# Patient Record
Sex: MALE | Race: WHITE | NOT HISPANIC OR LATINO | Employment: OTHER | URBAN - METROPOLITAN AREA
[De-identification: names, ages, dates, MRNs, and addresses within clinical notes are randomized per-mention and may not be internally consistent; named-entity substitution may affect disease eponyms.]

---

## 2023-09-19 ENCOUNTER — HOSPITAL ENCOUNTER (OUTPATIENT)
Dept: DATA CONVERSION | Facility: HOSPITAL | Age: 73
End: 2023-09-19
Attending: INTERNAL MEDICINE | Admitting: INTERNAL MEDICINE
Payer: MEDICARE

## 2023-09-19 DIAGNOSIS — D12.8 BENIGN NEOPLASM OF RECTUM: ICD-10-CM

## 2023-09-19 DIAGNOSIS — J84.10 PULMONARY FIBROSIS, UNSPECIFIED (MULTI): ICD-10-CM

## 2023-09-19 DIAGNOSIS — K64.1 SECOND DEGREE HEMORRHOIDS: ICD-10-CM

## 2023-09-19 DIAGNOSIS — Z12.11 ENCOUNTER FOR SCREENING FOR MALIGNANT NEOPLASM OF COLON: ICD-10-CM

## 2023-09-19 DIAGNOSIS — Z01.818 ENCOUNTER FOR OTHER PREPROCEDURAL EXAMINATION: ICD-10-CM

## 2023-09-19 DIAGNOSIS — D12.4 BENIGN NEOPLASM OF DESCENDING COLON: ICD-10-CM

## 2023-09-19 DIAGNOSIS — D12.0 BENIGN NEOPLASM OF CECUM: ICD-10-CM

## 2023-09-19 DIAGNOSIS — K57.30 DIVERTICULOSIS OF LARGE INTESTINE WITHOUT PERFORATION OR ABSCESS WITHOUT BLEEDING: ICD-10-CM

## 2023-09-19 DIAGNOSIS — Z86.010 PERSONAL HISTORY OF COLONIC POLYPS: ICD-10-CM

## 2023-09-22 ENCOUNTER — HOSPITAL ENCOUNTER (OUTPATIENT)
Dept: DATA CONVERSION | Facility: HOSPITAL | Age: 73
End: 2023-09-22
Attending: INTERNAL MEDICINE | Admitting: INTERNAL MEDICINE
Payer: MEDICARE

## 2023-09-22 DIAGNOSIS — Z48.21 ENCOUNTER FOR AFTERCARE FOLLOWING HEART TRANSPLANT (MULTI): ICD-10-CM

## 2023-09-22 DIAGNOSIS — J44.9 CHRONIC OBSTRUCTIVE PULMONARY DISEASE, UNSPECIFIED (MULTI): ICD-10-CM

## 2023-09-22 DIAGNOSIS — I10 ESSENTIAL (PRIMARY) HYPERTENSION: ICD-10-CM

## 2023-09-22 DIAGNOSIS — I25.10 ATHEROSCLEROTIC HEART DISEASE OF NATIVE CORONARY ARTERY WITHOUT ANGINA PECTORIS: ICD-10-CM

## 2023-09-22 DIAGNOSIS — E78.5 HYPERLIPIDEMIA, UNSPECIFIED: ICD-10-CM

## 2023-09-22 LAB
ACTIVATED PARTIAL THROMBOPLASTIN TIME IN PPP BY COAGULATION ASSAY: 31 SEC (ref 27–38)
ANION GAP IN SER/PLAS: 16 MMOL/L (ref 10–20)
CALCIUM (MG/DL) IN SER/PLAS: 10 MG/DL (ref 8.6–10.6)
CARBON DIOXIDE, TOTAL (MMOL/L) IN SER/PLAS: 24 MMOL/L (ref 21–32)
CHLORIDE (MMOL/L) IN SER/PLAS: 106 MMOL/L (ref 98–107)
CREATININE (MG/DL) IN SER/PLAS: 0.95 MG/DL (ref 0.5–1.3)
ERYTHROCYTE DISTRIBUTION WIDTH (RATIO) BY AUTOMATED COUNT: 12.8 % (ref 11.5–14.5)
ERYTHROCYTE MEAN CORPUSCULAR HEMOGLOBIN CONCENTRATION (G/DL) BY AUTOMATED: 32.9 G/DL (ref 32–36)
ERYTHROCYTE MEAN CORPUSCULAR VOLUME (FL) BY AUTOMATED COUNT: 95 FL (ref 80–100)
ERYTHROCYTES (10*6/UL) IN BLOOD BY AUTOMATED COUNT: 6.11 X10E12/L (ref 4.5–5.9)
GFR MALE: 86 ML/MIN/1.73M2
GLUCOSE (MG/DL) IN SER/PLAS: 90 MG/DL (ref 74–99)
HEMATOCRIT (%) IN BLOOD BY AUTOMATED COUNT: 58 % (ref 41–52)
HEMOGLOBIN (G/DL) IN BLOOD: 19.1 G/DL (ref 13.5–17.5)
INR IN PPP BY COAGULATION ASSAY: 1.1 (ref 0.9–1.1)
LEUKOCYTES (10*3/UL) IN BLOOD BY AUTOMATED COUNT: 11.4 X10E9/L (ref 4.4–11.3)
NRBC (PER 100 WBCS) BY AUTOMATED COUNT: 0 /100 WBC (ref 0–0)
PLATELETS (10*3/UL) IN BLOOD AUTOMATED COUNT: 186 X10E9/L (ref 150–450)
POTASSIUM (MMOL/L) IN SER/PLAS: 4.3 MMOL/L (ref 3.5–5.3)
PROTHROMBIN TIME (PT) IN PPP BY COAGULATION ASSAY: 12.4 SEC (ref 9.8–12.8)
SODIUM (MMOL/L) IN SER/PLAS: 142 MMOL/L (ref 136–145)
UREA NITROGEN (MG/DL) IN SER/PLAS: 12 MG/DL (ref 6–23)

## 2023-09-26 LAB
COMPLETE PATHOLOGY REPORT: NORMAL
CONVERTED CLINICAL DIAGNOSIS-HISTORY: NORMAL
CONVERTED FINAL DIAGNOSIS: NORMAL
CONVERTED FINAL REPORT PDF LINK TO COPY AND PASTE: NORMAL
CONVERTED GROSS DESCRIPTION: NORMAL

## 2023-09-29 ENCOUNTER — LAB (OUTPATIENT)
Dept: LAB | Facility: LAB | Age: 73
End: 2023-09-29
Payer: MEDICARE

## 2023-09-29 LAB
ABO GROUP (TYPE) IN BLOOD: NORMAL
ALANINE AMINOTRANSFERASE (SGPT) (U/L) IN SER/PLAS: 66 U/L (ref 10–52)
ALBUMIN (G/DL) IN SER/PLAS: 4.7 G/DL (ref 3.4–5)
ALKALINE PHOSPHATASE (U/L) IN SER/PLAS: 86 U/L (ref 33–136)
AMPHETAMINE (PRESENCE) IN URINE BY SCREEN METHOD: NORMAL
ANION GAP IN SER/PLAS: 16 MMOL/L (ref 10–20)
APPEARANCE, URINE: CLEAR
ASPARTATE AMINOTRANSFERASE (SGOT) (U/L) IN SER/PLAS: 34 U/L (ref 9–39)
BARBITURATES PRESENCE IN URINE BY SCREEN METHOD: NORMAL
BASOPHILS (10*3/UL) IN BLOOD BY AUTOMATED COUNT: 0.11 X10E9/L (ref 0–0.1)
BASOPHILS/100 LEUKOCYTES IN BLOOD BY AUTOMATED COUNT: 1 % (ref 0–2)
BENZODIAZEPINE (PRESENCE) IN URINE BY SCREEN METHOD: NORMAL
BILIRUBIN TOTAL (MG/DL) IN SER/PLAS: 0.7 MG/DL (ref 0–1.2)
BILIRUBIN, URINE: NEGATIVE
BLOOD, URINE: NEGATIVE
C REACTIVE PROTEIN (MG/L) IN SER/PLAS BY HIGH SENSIT: 6.6 MG/L
CALCIDIOL (25 OH VITAMIN D3) (NG/ML) IN SER/PLAS: 55 NG/ML
CALCIUM (MG/DL) IN SER/PLAS: 10.2 MG/DL (ref 8.6–10.6)
CANNABINOIDS IN URINE BY SCREEN METHOD: NORMAL
CARBON DIOXIDE, TOTAL (MMOL/L) IN SER/PLAS: 26 MMOL/L (ref 21–32)
CHLORIDE (MMOL/L) IN SER/PLAS: 102 MMOL/L (ref 98–107)
CHOLESTEROL (MG/DL) IN SER/PLAS: 161 MG/DL (ref 0–199)
CHOLESTEROL IN HDL (MG/DL) IN SER/PLAS: 33.2 MG/DL
CHOLESTEROL/HDL RATIO: 4.8
COCAINE (PRESENCE) IN URINE BY SCREEN METHOD: NORMAL
COLOR, URINE: YELLOW
CREATININE (MG/DL) IN SER/PLAS: 0.86 MG/DL (ref 0.5–1.3)
CYTOMEGALOVIRUS IGG ANTIBODY: NONREACTIVE
DRUG SCREEN COMMENT URINE: NORMAL
EOSINOPHILS (10*3/UL) IN BLOOD BY AUTOMATED COUNT: 0.14 X10E9/L (ref 0–0.7)
EOSINOPHILS/100 LEUKOCYTES IN BLOOD BY AUTOMATED COUNT: 1.2 % (ref 0–6)
ERYTHROCYTE DISTRIBUTION WIDTH (RATIO) BY AUTOMATED COUNT: 13.2 % (ref 11.5–14.5)
ERYTHROCYTE MEAN CORPUSCULAR HEMOGLOBIN CONCENTRATION (G/DL) BY AUTOMATED: 33.1 G/DL (ref 32–36)
ERYTHROCYTE MEAN CORPUSCULAR VOLUME (FL) BY AUTOMATED COUNT: 93 FL (ref 80–100)
ERYTHROCYTES (10*6/UL) IN BLOOD BY AUTOMATED COUNT: 6.69 X10E12/L (ref 4.5–5.9)
ESTIMATED AVERAGE GLUCOSE FOR HBA1C: 134 MG/DL
FENTANYL URINE: NORMAL
GFR MALE: >90 ML/MIN/1.73M2
GLUCOSE (MG/DL) IN SER/PLAS: 88 MG/DL (ref 74–99)
GLUCOSE, URINE: NEGATIVE MG/DL
HEMATOCRIT (%) IN BLOOD BY AUTOMATED COUNT: 62 % (ref 41–52)
HEMOGLOBIN (G/DL) IN BLOOD: 20.5 G/DL (ref 13.5–17.5)
HEMOGLOBIN A1C/HEMOGLOBIN TOTAL IN BLOOD: 6.3 %
HEPATITIS A TOTAL AB INTERPRETATION: REACTIVE
HEPATITIS B VIRUS CORE AB (PRESENCE) IN SER/PLAS BY IMM: NONREACTIVE
HEPATITIS B VIRUS SURFACE AB (MIU/ML) IN SERUM: <3.1 MIU/ML
HEPATITIS B VIRUS SURFACE AG PRESENCE IN SERUM: NONREACTIVE
HEPATITIS C VIRUS AB PRESENCE IN SERUM: NONREACTIVE
HERPES SIMPLEX VIRUS 1 IGG: <0.2 INDEX
HERPES SIMPLEX VIRUS 2 IGG: <0.2 INDEX
HIV 1/ 2 AG/AB SCREEN: NONREACTIVE
IGA (MG/DL) IN SER/PLAS: 186 MG/DL (ref 70–400)
IGE (IU/L) IN SERUM OR PLASMA: 2 IU/ML (ref 0–214)
IGG (MG/DL) IN SER/PLAS: 874 MG/DL (ref 700–1600)
IGG SUBCLASS 1 (MG/DL) IN SERUM: 556 MG/DL (ref 490–1140)
IGG SUBCLASS 2 (MG/DL) IN SERUM: 259 MG/DL (ref 150–640)
IGG SUBCLASS 3 (MG/DL) IN SERUM: 30 MG/DL (ref 11–85)
IGG SUBCLASS 4 (MG/DL) IN SERUM: 18 MG/DL (ref 3–200)
IGM (MG/DL) IN SER/PLAS: 396 MG/DL (ref 40–230)
IMMATURE GRANULOCYTES/100 LEUKOCYTES IN BLOOD BY AUTOMATED COUNT: 1.2 % (ref 0–0.9)
INR IN PPP BY COAGULATION ASSAY: 1.1 (ref 0.9–1.1)
IRON (UG/DL) IN SER/PLAS: 101 UG/DL (ref 35–150)
IRON BINDING CAPACITY (UG/DL) IN SER/PLAS: 341 UG/DL (ref 240–445)
IRON SATURATION (%) IN SER/PLAS: 30 % (ref 25–45)
KETONES, URINE: NEGATIVE MG/DL
LDL: 55 MG/DL (ref 0–99)
LEUKOCYTE ESTERASE, URINE: NEGATIVE
LEUKOCYTES (10*3/UL) IN BLOOD BY AUTOMATED COUNT: 11.3 X10E9/L (ref 4.4–11.3)
LYMPHOCYTES (10*3/UL) IN BLOOD BY AUTOMATED COUNT: 2.19 X10E9/L (ref 1.2–4.8)
LYMPHOCYTES/100 LEUKOCYTES IN BLOOD BY AUTOMATED COUNT: 19.3 % (ref 13–44)
MAGNESIUM (MG/DL) IN SER/PLAS: 2.06 MG/DL (ref 1.6–2.4)
MONOCYTES (10*3/UL) IN BLOOD BY AUTOMATED COUNT: 1.14 X10E9/L (ref 0.1–1)
MONOCYTES/100 LEUKOCYTES IN BLOOD BY AUTOMATED COUNT: 10.1 % (ref 2–10)
MUMPS IGG ANTIBODY: POSITIVE
NEUTROPHILS (10*3/UL) IN BLOOD BY AUTOMATED COUNT: 7.62 X10E9/L (ref 1.2–7.7)
NEUTROPHILS/100 LEUKOCYTES IN BLOOD BY AUTOMATED COUNT: 67.2 % (ref 40–80)
NITRITE, URINE: NEGATIVE
NON HDL CHOLESTEROL: 128 MG/DL
NRBC (PER 100 WBCS) BY AUTOMATED COUNT: 0 /100 WBC (ref 0–0)
OPIATES (PRESENCE) IN URINE BY SCREEN METHOD: NORMAL
OXYCODONE (PRESENCE) IN URINE BY SCREEN METHOD: NORMAL
PH, URINE: 5 (ref 5–8)
PHENCYCLIDINE (PRESENCE) IN URINE BY SCREEN METHOD: NORMAL
PLATELETS (10*3/UL) IN BLOOD AUTOMATED COUNT: 195 X10E9/L (ref 150–450)
POTASSIUM (MMOL/L) IN SER/PLAS: 4.6 MMOL/L (ref 3.5–5.3)
PREALBUMIN (MG/DL) IN SERUM OR PLASMA: 35.8 MG/DL (ref 18–40)
PROSTATE SPECIFIC ANTIGEN,SCREEN: 2.63 NG/ML (ref 0–4)
PROTEIN TOTAL: 7.7 G/DL (ref 6.4–8.2)
PROTEIN, URINE: NEGATIVE MG/DL
PROTHROMBIN TIME (PT) IN PPP BY COAGULATION ASSAY: 12.5 SEC (ref 9.8–12.8)
RH FACTOR: NORMAL
RUBELLA VIRUS IGG AB: POSITIVE
RUBEOLA IGG ANTIBODY: POSITIVE
SODIUM (MMOL/L) IN SER/PLAS: 139 MMOL/L (ref 136–145)
SPECIFIC GRAVITY, URINE: 1.01 (ref 1–1.03)
SYPHILIS TOTAL AB: NONREACTIVE
THYROTROPIN (MIU/L) IN SER/PLAS BY DETECTION LIMIT <= 0.05 MIU/L: 2.39 MIU/L (ref 0.44–3.98)
THYROXINE (T4) FREE (NG/DL) IN SER/PLAS: 0.97 NG/DL (ref 0.78–1.48)
TOXOPLASMA GONDII IGG: NONREACTIVE
TRIGLYCERIDE (MG/DL) IN SER/PLAS: 362 MG/DL (ref 0–149)
TRIIODOTHYRONINE (T3) FREE (PG/ML) IN SER/PLAS: 3.5 PG/ML (ref 2.3–4.2)
UREA NITROGEN (MG/DL) IN SER/PLAS: 14 MG/DL (ref 6–23)
UROBILINOGEN, URINE: <2 MG/DL (ref 0–1.9)
VARICELLA ZOSTER IGG: POSITIVE
VLDL: 72 MG/DL (ref 0–40)

## 2023-09-30 LAB
HBV DNA PCR COMMENT: NORMAL
HBV DNA QUANT PCR IU/ML: NOT DETECTED IU/ML
HBV DNA QUANT PCR LOG: NORMAL LOG IU/ML
HCV PCR QUANT: NOT DETECTED IU/ML
HCV RNA, PCR LOG: NORMAL LOG10 IU/ML

## 2023-09-30 PROCEDURE — 81379 HLA I TYPING COMPLETE HR: CPT

## 2023-09-30 PROCEDURE — 86833 HLA CLASS II HIGH DEFIN QUAL: CPT

## 2023-09-30 PROCEDURE — 81382 HLA II TYPING 1 LOC HR: CPT

## 2023-09-30 PROCEDURE — 86832 HLA CLASS I HIGH DEFIN QUAL: CPT

## 2023-09-30 NOTE — H&P
History & Physical Reviewed:   I have reviewed the History and Physical dated:  25-Aug-2023   History and Physical reviewed and relevant findings noted. Patient examined to review pertinent physical  findings.: No significant changes  71 y/o M with COPD/IPF, hypertension, hyperlipidemia, vitamin D deficiency who presents for initial evaluation for lung transplant.   Home Medications Reviewed: no changes noted   Allergies Reviewed: no changes noted       Airway/Sedation Assessment:  ·  Emotional Status calm   ·  Neurologic alert & oriented x 3   ·  Respiratory clear to auscultation  diminished breath sounds.   ·  Cardiovascular rhythm & rate regular   ·  GI/ soft, nontender     · Pulses present: Pedal Left, Pedal Right, Radial Left, Radial Right     ·  Mouth Opening OK yes   ·  Neck Flexibility OK yes   ·  Loose Teeth yes  full dentures in place   ·  Oropharyngeal Classification Class II   ·  ASA PS Classification ASA III   ·  Sedation Plan moderate sedation       ERAS (Enhanced Recovery After Surgery):  ·  ERAS Patient: no     Consent:   COVID-19 Consent:  ·  COVID-19 Risk Consent Surgeon has reviewed key risks related to the risk of felicity COVID-19 and if they contract COVID-19 what the risks are.     Coronavirus Attestation of Need for Surgery:  ·  COVID-19 Surgery / Procedure Attestation: Select all criteria that apply Medically necessary with no anticipated overnight stay       Electronic Signatures:  Pilar Stafford (PAC)  (Signed 22-Sep-2023 12:34)   Authored: History & Physical Reviewed, Airway/Sedation,  ERAS, Consent, Note Completion      Last Updated: 22-Sep-2023 12:34 by Pilar Stafford (PAC)

## 2023-09-30 NOTE — H&P
History of Present Illness:   History Present Illness:  Reason for surgery: Lung transplant eval   HPI:    71 y/o M with COPD/IPF, hypertension, hyperlipidemia, vitamin D deficiency who presents for initial evaluation for lung transplant. CT chest did show severe CA calcifications.  LHC/RHC requested as part of transplant workup.    Allergies:        Allergies:  ·  No Known Allergies :     Home Medication Review:   Home Medications Reviewed: yes     Impression/Procedure:   ·  Impression and Planned Procedure: RHC/LHC       ERAS (Enhanced Recovery After Surgery):  ·  ERAS Patient: no      Review of Systems:   Review of Systems:  All Other Systems: All other systems reviewed and  are negative       Physical Exam by System:    Respiratory/Thorax: no increased WOB on baseline  O2 by NC   Cardiovascular: RRR, no JVD, trace LE edema, 2+ peripheral  pulses     Airway/Sedation Assessment:  ·  Emotional Status calm   ·  Neurologic alert & oriented x 3   ·  Respiratory clear to auscultation   ·  Cardiovascular rhythm & rate regular   ·  GI/ soft, nontender     · Pulses present: Pedal Left, Pedal Right, Radial Left, Radial Right     ·  Mouth Opening OK yes   ·  Neck Flexibility OK yes   ·  Loose Teeth no     Oropharyngeal Classification:          ·  Oropharyngeal Classification Class I   ·  ASA PS Classification ASA III   ·  Sedation Plan moderate sedation     Consent:   COVID-19 Consent:  ·  COVID-19 Risk Consent Surgeon has reviewed key risks related to the risk of felicity COVID-19 and if they contract COVID-19 what the risks are.      Attestation:   Note Completion:  I am a:  Resident/Fellow   Attending Attestation I saw and evaluated the patient.  I personally obtained the key and critical portions of the history and physical exam or was physically present for key and  critical portions performed by the resident/fellow. I reviewed the resident/fellow?s documentation and discussed the patient with the  resident/fellow.  I agree with the resident/fellow?s medical decision making as documented in the note.     I personally evaluated the patient on 22-Sep-2023         Electronic Signatures:  Ector Thornton (MD (Fellow))  (Signed 22-Sep-2023 12:24)   Authored: History of Present Illness, Allergies, Home  Medication Review, Impression/Procedure, Review of Systems, Physical Exam, Note Completion  Brennen Quintana)  (Signed 23-Sep-2023 08:36)   Authored: ERAS, Consent, Note Completion   Co-Signer: History of Present Illness, Allergies, Home Medication Review, Impression/Procedure, Review of Systems, Physical Exam, Note  Completion      Last Updated: 23-Sep-2023 08:36 by Brennen Quintana)

## 2023-10-02 LAB
CYTOMEGALOVIRUS IGM ANTIBODY: <8 AU/ML
EBV INTERPRETATION: ABNORMAL
EPSTEIN-BARR VCA IGG: POSITIVE
EPSTEIN-BARR VCA IGM: 31.7 U/ML (ref 0–43.9)
EPSTEIN-BARR VIRUS EARLY ANTIGEN ANTIBODY, IGG: NEGATIVE
EPSTIEN-BARR NUCLEAR ANTIGEN AB: POSITIVE
HLA CLASS I SP ANTIBODY IDENTIFICATION, HIGH DEFINITION: NORMAL
HLA CLASS II SP ANTIBODY IDENTIFICATION, HIGH DEFINITION: NORMAL
HLA-A LOCUS LOW RESOLUTION TYPING: NORMAL
HLA-B LOCUS LOW RESOLUTION TYPING: NORMAL
HLA-C LOCUS LOW RESOLUTION TYPING: NORMAL
HLA-DPB1 HIGH RESOLUTION TYPING: NORMAL
HLA-DQB1 HIGH RESOLUTION TYPING: NORMAL
HLA-DR1/3/4/5 + DQB1 LOW RES TYPING: NORMAL
TOXOPLASMA IGM ANTIBODY: <3 AU/ML

## 2023-10-03 ENCOUNTER — LAB REQUISITION (OUTPATIENT)
Dept: LAB | Facility: CLINIC | Age: 73
End: 2023-10-03
Payer: MEDICARE

## 2023-10-03 DIAGNOSIS — J84.10 PULMONARY FIBROSIS (MULTI): Primary | ICD-10-CM

## 2023-10-03 DIAGNOSIS — J84.112 IDIOPATHIC PULMONARY FIBROSIS (MULTI): ICD-10-CM

## 2023-10-03 LAB
3-OH-COTININE, URINE: <50 NG/ML
ANABASINE, URINE: <5 NG/ML
COTININE, URINE: <15 NG/ML
DIPHTHERIA ANTIBODY: 1.3 IU/ML
HAEMOPHILUS INFLUENZAE B AB IGG: 4.6 UG/ML
HLA RESULTS: NORMAL
HLA-A+B+C AB NFR SER: NORMAL %
HLA-DP+DQ+DR AB NFR SER: NORMAL %
NICOTINE, URINE: <15 NG/ML
NIL(NEG) CONTROL SPOT COUNT: NORMAL
PANEL A SPOT COUNT: 0
PANEL B SPOT COUNT: 0
POS CONTROL SPOT COUNT: NORMAL
T-SPOT. TB INTERPRETATION: NEGATIVE
TETANUS AB IGG: 4.7 IU/ML

## 2023-10-03 RX ORDER — PIRFENIDONE 267 MG/1
564 TABLET, FILM COATED ORAL
Qty: 191 TABLET | Refills: 11 | Status: SHIPPED | OUTPATIENT
Start: 2023-10-03 | End: 2023-11-26 | Stop reason: HOSPADM

## 2023-10-04 ENCOUNTER — TELEPHONE (OUTPATIENT)
Dept: TRANSPLANT | Facility: HOSPITAL | Age: 73
End: 2023-10-04
Payer: MEDICARE

## 2023-10-04 LAB
PNEUMO SEROTYPE INTERPRETATION: NORMAL
SEROTYPE 1, VIRC: 0.77 UG/ML
SEROTYPE 10A(34), VIRC: 2.79 UG/ML
SEROTYPE 11A(43), VIRC: 0.07 UG/ML
SEROTYPE 12F, VIRC: 0.85 UG/ML
SEROTYPE 14, VIRC: 0.3 UG/ML
SEROTYPE 15B(54), VIRC: 0.69 UG/ML
SEROTYPE 17F, VIRC: 2.46 UG/ML
SEROTYPE 18C(56), VIRC: 0.56 UG/ML
SEROTYPE 19A(57), VIRC: 7.99 UG/ML
SEROTYPE 19F, VIRC: 4.95 UG/ML
SEROTYPE 2, VIRC: 1.24 UG/ML
SEROTYPE 20, VIRC: 2.02 UG/ML
SEROTYPE 22F, VIRC: 0.23 UG/ML
SEROTYPE 23F, VIRC: 0.21 UG/ML
SEROTYPE 3, VIRC: 0.59 UG/ML
SEROTYPE 33F(70), VIRC: 8.82 UG/ML
SEROTYPE 4, VIRC: 0.39 UG/ML
SEROTYPE 5, VIRC: 0.21 UG/ML
SEROTYPE 6B(26), VIRC: 0.2 UG/ML
SEROTYPE 7F(51), VIRC: 0.21 UG/ML
SEROTYPE 8, VIRC: 0.22 UG/ML
SEROTYPE 9N, VIRC: 0.25 UG/ML
SEROTYPE 9V(68), VIRC: 0.17 UG/ML

## 2023-10-04 NOTE — TELEPHONE ENCOUNTER
Pt. Contacted us to let us know that his urology appointment has been moved from 10/28 to 10/9, the results will be faxed to us once the appointment has been completed.

## 2023-10-05 LAB
HISTOPLASMA AB, ID: NOT DETECTED
HISTOPLASMA MYCELIA AB, CF: NORMAL
HISTOPLASMA YEAST ANTIBODIES BY CF: NORMAL

## 2023-10-06 ENCOUNTER — SPECIALTY PHARMACY (OUTPATIENT)
Dept: PHARMACY | Facility: CLINIC | Age: 73
End: 2023-10-06

## 2023-10-09 LAB
HLA CLS I TYP PNL BLD/T DONR HIGH RES: NORMAL
HLA RESULTS: NORMAL
HLA-DP2 QL: NORMAL
HLA-DQB1 HIGH RES: NORMAL
HLA-DRB1 HIGH RES: NORMAL

## 2023-10-10 NOTE — PROGRESS NOTES
Called [facility] and provided a verbal order for Perfinidone 267 MG tabs 3 tabs 3 times daily with meals.

## 2023-10-12 ENCOUNTER — TELEPHONE (OUTPATIENT)
Dept: TRANSPLANT | Facility: HOSPITAL | Age: 73
End: 2023-10-12
Payer: MEDICARE

## 2023-10-12 NOTE — TELEPHONE ENCOUNTER
Returned David's call. I let him know we did receive the records from his urologist. Dr. Montague signed medical clearance for them to proceed with his procedure on 11/2.

## 2023-10-12 NOTE — TELEPHONE ENCOUNTER
Patient called to ensure that Dr. Montague has received the report from his urologist. Please advise if documents have been received.

## 2023-10-16 LAB
ATRIAL RATE: 62 BPM
P AXIS: 51 DEGREES
P OFFSET: 195 MS
P ONSET: 137 MS
PR INTERVAL: 162 MS
Q ONSET: 218 MS
QRS COUNT: 10 BEATS
QRS DURATION: 94 MS
QT INTERVAL: 426 MS
QTC CALCULATION(BAZETT): 432 MS
QTC FREDERICIA: 430 MS
R AXIS: 71 DEGREES
T AXIS: 34 DEGREES
T OFFSET: 431 MS
VENTRICULAR RATE: 62 BPM

## 2023-10-25 ENCOUNTER — APPOINTMENT (OUTPATIENT)
Dept: VASCULAR SURGERY | Facility: CLINIC | Age: 73
End: 2023-10-25

## 2023-10-26 PROCEDURE — 93010 ELECTROCARDIOGRAM REPORT: CPT | Performed by: INTERNAL MEDICINE

## 2023-10-31 PROBLEM — I10 HTN (HYPERTENSION): Status: ACTIVE | Noted: 2023-10-31

## 2023-10-31 PROBLEM — I25.10 CORONARY ARTERY DISEASE: Status: ACTIVE | Noted: 2023-10-31

## 2023-10-31 PROBLEM — K21.9 ESOPHAGEAL REFLUX: Status: ACTIVE | Noted: 2023-10-31

## 2023-10-31 PROBLEM — E78.00 HYPERCHOLESTEROLEMIA: Status: ACTIVE | Noted: 2023-10-31

## 2023-10-31 PROBLEM — R93.89 ABNORMAL CT OF THE CHEST: Status: ACTIVE | Noted: 2023-10-31

## 2023-10-31 PROBLEM — C44.309: Status: ACTIVE | Noted: 2023-10-31

## 2023-10-31 PROBLEM — E55.9 MILD VITAMIN D DEFICIENCY: Status: ACTIVE | Noted: 2023-10-31

## 2023-10-31 PROBLEM — R97.20 ELEVATED PSA: Status: ACTIVE | Noted: 2023-10-31

## 2023-10-31 PROBLEM — J84.9 ILD (INTERSTITIAL LUNG DISEASE) (MULTI): Status: ACTIVE | Noted: 2023-10-31

## 2023-10-31 PROBLEM — N52.9 ERECTILE DYSFUNCTION: Status: ACTIVE | Noted: 2023-10-31

## 2023-10-31 PROBLEM — M17.9 OSTEOARTHRITIS OF KNEE: Status: ACTIVE | Noted: 2023-10-31

## 2023-10-31 PROBLEM — T38.0X5A ADVERSE EFFECT OF CORTICOSTEROIDS: Status: ACTIVE | Noted: 2023-10-31

## 2023-10-31 PROBLEM — N13.8 BPH WITH URINARY OBSTRUCTION: Status: ACTIVE | Noted: 2023-10-31

## 2023-10-31 PROBLEM — I27.29 OTHER SECONDARY PULMONARY HYPERTENSION (MULTI): Status: ACTIVE | Noted: 2023-10-31

## 2023-10-31 PROBLEM — J18.1 LUNG CONSOLIDATION (CMS-HCC): Status: ACTIVE | Noted: 2023-10-31

## 2023-10-31 PROBLEM — E29.1 HYPOGONADISM MALE: Status: ACTIVE | Noted: 2023-10-31

## 2023-10-31 PROBLEM — N42.89 PROSTATE MASS: Status: ACTIVE | Noted: 2023-10-31

## 2023-10-31 PROBLEM — N40.1 BPH WITH URINARY OBSTRUCTION: Status: ACTIVE | Noted: 2023-10-31

## 2023-10-31 RX ORDER — ARFORMOTEROL TARTRATE 15 UG/2ML
2 SOLUTION RESPIRATORY (INHALATION)
COMMUNITY
End: 2023-11-10 | Stop reason: ALTCHOICE

## 2023-10-31 RX ORDER — FERROUS SULFATE 325(65) MG
325 TABLET, DELAYED RELEASE (ENTERIC COATED) ORAL DAILY
COMMUNITY
Start: 2021-03-02 | End: 2023-11-10 | Stop reason: ALTCHOICE

## 2023-10-31 RX ORDER — REVEFENACIN 175 UG/3ML
3 SOLUTION RESPIRATORY (INHALATION)
COMMUNITY
End: 2023-11-10 | Stop reason: ALTCHOICE

## 2023-10-31 RX ORDER — GUAIFENESIN 1200 MG
325 TABLET, EXTENDED RELEASE 12 HR ORAL EVERY 6 HOURS PRN
COMMUNITY

## 2023-10-31 RX ORDER — ATORVASTATIN CALCIUM 80 MG/1
80 TABLET, FILM COATED ORAL DAILY
COMMUNITY
End: 2024-01-02 | Stop reason: SDUPTHER

## 2023-10-31 RX ORDER — CLOPIDOGREL BISULFATE 75 MG/1
TABLET ORAL
COMMUNITY
Start: 2023-09-27 | End: 2023-11-13 | Stop reason: SDUPTHER

## 2023-10-31 RX ORDER — BISOPROLOL FUMARATE 5 MG/1
TABLET, FILM COATED ORAL
COMMUNITY
Start: 2023-09-27 | End: 2023-11-10 | Stop reason: ALTCHOICE

## 2023-10-31 RX ORDER — ALBUTEROL SULFATE 90 UG/1
2 AEROSOL, METERED RESPIRATORY (INHALATION) EVERY 4 HOURS PRN
COMMUNITY
End: 2023-11-10 | Stop reason: ALTCHOICE

## 2023-10-31 RX ORDER — DOCUSATE SODIUM 100 MG/1
100 CAPSULE, LIQUID FILLED ORAL 2 TIMES DAILY PRN
COMMUNITY
Start: 2021-03-02 | End: 2023-11-10 | Stop reason: ALTCHOICE

## 2023-10-31 RX ORDER — CHOLECALCIFEROL (VITAMIN D3) 125 MCG
5000 CAPSULE ORAL DAILY
COMMUNITY

## 2023-10-31 RX ORDER — ADHESIVE BANDAGE
30 BANDAGE TOPICAL 2 TIMES DAILY
COMMUNITY
Start: 2021-03-03 | End: 2023-11-10 | Stop reason: ALTCHOICE

## 2023-10-31 RX ORDER — HYDROCHLOROTHIAZIDE 25 MG/1
25 TABLET ORAL EVERY MORNING
COMMUNITY

## 2023-10-31 RX ORDER — VITAMIN B COMPLEX
1 CAPSULE ORAL DAILY
COMMUNITY

## 2023-10-31 RX ORDER — HYDROCHLOROTHIAZIDE 12.5 MG/1
12.5 CAPSULE ORAL DAILY
COMMUNITY
Start: 2021-09-29 | End: 2023-11-10 | Stop reason: DRUGHIGH

## 2023-10-31 RX ORDER — ATORVASTATIN CALCIUM 20 MG/1
80 TABLET, FILM COATED ORAL EVERY EVENING
COMMUNITY
Start: 2022-10-28 | End: 2023-11-10 | Stop reason: DRUGHIGH

## 2023-10-31 RX ORDER — ASPIRIN 325 MG
325 TABLET, DELAYED RELEASE (ENTERIC COATED) ORAL
COMMUNITY
Start: 2021-03-02 | End: 2023-11-10 | Stop reason: ALTCHOICE

## 2023-10-31 RX ORDER — ESCITALOPRAM OXALATE 5 MG/1
5 TABLET ORAL DAILY
COMMUNITY
Start: 2022-10-28

## 2023-10-31 RX ORDER — LOSARTAN POTASSIUM 25 MG/1
25 TABLET ORAL DAILY
COMMUNITY
Start: 2022-10-28

## 2023-11-01 ENCOUNTER — APPOINTMENT (OUTPATIENT)
Dept: VASCULAR SURGERY | Facility: HOSPITAL | Age: 73
End: 2023-11-01

## 2023-11-03 ENCOUNTER — TELEPHONE (OUTPATIENT)
Dept: VASCULAR MEDICINE | Facility: HOSPITAL | Age: 73
End: 2023-11-03
Payer: MEDICARE

## 2023-11-03 NOTE — TELEPHONE ENCOUNTER
Patient left vm message that his appt. With Dr. Morgan was canceled.  He asked if there was another physician that he could see.  Dr. Morgan is booked 4-5 weeks.  Please call patient to discuss.

## 2023-11-03 NOTE — TELEPHONE ENCOUNTER
Returned David's call. His procedure went well yesterday. He believes he will get the results next week.    He does feel that he is worsening and is concerned that the vascular appointment was rescheduled so far out. I told him the team is aware of the reschedule date and is working on getting an appointment sooner for him.

## 2023-11-08 DIAGNOSIS — I65.29 STENOSIS OF CAROTID ARTERY, UNSPECIFIED LATERALITY: Primary | ICD-10-CM

## 2023-11-10 ENCOUNTER — OFFICE VISIT (OUTPATIENT)
Dept: VASCULAR SURGERY | Facility: HOSPITAL | Age: 73
End: 2023-11-10
Payer: MEDICARE

## 2023-11-10 ENCOUNTER — LAB (OUTPATIENT)
Dept: LAB | Facility: LAB | Age: 73
End: 2023-11-10
Payer: MEDICARE

## 2023-11-10 VITALS
SYSTOLIC BLOOD PRESSURE: 120 MMHG | BODY MASS INDEX: 32.18 KG/M2 | HEIGHT: 67 IN | DIASTOLIC BLOOD PRESSURE: 63 MMHG | HEART RATE: 67 BPM | WEIGHT: 205 LBS | OXYGEN SATURATION: 91 %

## 2023-11-10 DIAGNOSIS — I65.23 CAROTID STENOSIS, BILATERAL: ICD-10-CM

## 2023-11-10 DIAGNOSIS — I65.23 CAROTID STENOSIS, BILATERAL: Primary | ICD-10-CM

## 2023-11-10 LAB
CREAT SERPL-MCNC: 0.87 MG/DL (ref 0.5–1.3)
GFR SERPL CREATININE-BSD FRML MDRD: >90 ML/MIN/1.73M*2

## 2023-11-10 PROCEDURE — 99214 OFFICE O/P EST MOD 30 MIN: CPT | Performed by: NURSE PRACTITIONER

## 2023-11-10 PROCEDURE — 1036F TOBACCO NON-USER: CPT | Performed by: NURSE PRACTITIONER

## 2023-11-10 PROCEDURE — 3078F DIAST BP <80 MM HG: CPT | Performed by: NURSE PRACTITIONER

## 2023-11-10 PROCEDURE — 82565 ASSAY OF CREATININE: CPT

## 2023-11-10 PROCEDURE — 99204 OFFICE O/P NEW MOD 45 MIN: CPT | Performed by: NURSE PRACTITIONER

## 2023-11-10 PROCEDURE — 3074F SYST BP LT 130 MM HG: CPT | Performed by: NURSE PRACTITIONER

## 2023-11-10 PROCEDURE — 36415 COLL VENOUS BLD VENIPUNCTURE: CPT

## 2023-11-10 PROCEDURE — 1159F MED LIST DOCD IN RCRD: CPT | Performed by: NURSE PRACTITIONER

## 2023-11-10 ASSESSMENT — ENCOUNTER SYMPTOMS
DEPRESSION: 0
OCCASIONAL FEELINGS OF UNSTEADINESS: 0
LOSS OF SENSATION IN FEET: 0

## 2023-11-13 ENCOUNTER — HOSPITAL ENCOUNTER (OUTPATIENT)
Dept: RADIOLOGY | Facility: HOSPITAL | Age: 73
Discharge: HOME | End: 2023-11-13
Payer: MEDICARE

## 2023-11-13 PROCEDURE — 70498 CT ANGIOGRAPHY NECK: CPT | Performed by: RADIOLOGY

## 2023-11-13 PROCEDURE — 2550000001 HC RX 255 CONTRASTS

## 2023-11-13 PROCEDURE — 70498 CT ANGIOGRAPHY NECK: CPT

## 2023-11-13 RX ORDER — CLOPIDOGREL BISULFATE 75 MG/1
75 TABLET ORAL DAILY
Qty: 30 TABLET | Refills: 2 | Status: SHIPPED | OUTPATIENT
Start: 2023-11-13 | End: 2023-11-13 | Stop reason: ENTERED-IN-ERROR

## 2023-11-13 RX ADMIN — IOHEXOL 75 ML: 350 INJECTION, SOLUTION INTRAVENOUS at 15:44

## 2023-11-14 ENCOUNTER — PREP FOR PROCEDURE (OUTPATIENT)
Dept: VASCULAR SURGERY | Facility: HOSPITAL | Age: 73
End: 2023-11-14
Payer: MEDICARE

## 2023-11-14 DIAGNOSIS — I65.22 SYMPTOMATIC STENOSIS OF LEFT CAROTID ARTERY: Primary | ICD-10-CM

## 2023-11-14 NOTE — PROGRESS NOTES
"Vascular Surgery Clinic Note    CC: NPV carotid stenosis     History Of Present Illness:   David Block is a 73 y.o. RHD male here for initial evaluation. He has idiopathic pulmonary fibrosis and is being evaluated for a lung transplant. He was found to have severe left carotid artery stenosis on recent carotid duplex ultrasound with a PSV/EDV of 649/311 and a ratio of 9.7. He reports left eye \"blurriness\" that started a month ago. These episodes occur once a day and last less than 30 seconds in duration. He states the blurriness resolves after he blinks a few times. He denies unilateral weakness or speech impairments. He has no history of neck surgery or radiation to the neck.     He quit smoking 13 years ago. He is on aspirin and a high-intensity statin.     Recent coronary catheterization performed 9/2023 demonstrates 2-vessel disease. He denies chest pain. He does have dyspnea with exertion and wears oxygen as needed.     He denies any known family history of aneurysmal disease. His weight has been stable. He has no history of DM or CVA.     PMH: HTN, HLD, IPF (PRN oxygen), carotid artery stenosis    Medical History:  Patient Active Problem List   Diagnosis    BPH with urinary obstruction    Cancer of skin of external cheek    Erectile dysfunction    Esophageal reflux    Elevated PSA    Coronary artery disease    HTN (hypertension)    Hypogonadism male    Osteoarthritis of knee    Other secondary pulmonary hypertension (CMS/HCC)    Prostate mass    Abnormal CT of the chest    Adverse effect of corticosteroids    Hypercholesterolemia    ILD (interstitial lung disease) (CMS/HCC)    Lung consolidation (CMS/HCC)    Mild vitamin D deficiency        SH:    Social Determinants of Health     Tobacco Use: Medium Risk (11/13/2023)    Patient History     Smoking Tobacco Use: Former     Smokeless Tobacco Use: Never     Passive Exposure: Not on file   Alcohol Use: Not on file   Financial Resource Strain: Not on file   Food " "Insecurity: Not on file   Transportation Needs: Not on file   Physical Activity: Not on file   Stress: Not on file   Social Connections: Not on file   Intimate Partner Violence: Not on file   Depression: Not on file   Housing Stability: Not on file   Utilities: Not on file   Digital Equity: Not on file        FH:  Family History   Problem Relation Name Age of Onset    Heart attack Father          Allergies:   Allergies   Allergen Reactions    Nifedipine Other     edema    Other Reaction(s): Edema       ROS:  All systems were reviewed and noted to be negative, other than described above.     Objective:  Last Recorded Vitals  Blood pressure 120/63, pulse 67, height 1.702 m (5' 7\"), weight 93 kg (205 lb), SpO2 91 %.    Meds:   Current Outpatient Medications   Medication Instructions    acetaminophen (TYLENOL) 325 mg, oral, Every 6 hours    aspirin 81 mg capsule 1 capsule, oral, Daily    atorvastatin (LIPITOR) 80 mg, oral, Daily    b complex vitamins capsule oral    cholecalciferol (VITAMIN D-3) 5,000 Units, oral, Daily    diphenhydrAMINE-acetaminophen (Tylenol PM)  mg per tablet 12 tablets, oral, Nightly PRN    escitalopram (LEXAPRO) 5 mg, oral, Daily    hydroCHLOROthiazide (HYDRODIURIL) 25 mg, oral, Every morning    losartan (COZAAR) 25 mg, oral, Daily    multivitamin/iron/folic acid (CENTRUM ORAL) 1 tablet, oral, Daily    oxygen (O2) gas therapy inhalation, 4L WITH ACTIVITY; 2L WITH REST AS NEEDED; DOES NOT USE O2 AT NIGHT    pirfenidone (ESBRIET) 564 mg, oral, 3 times daily with meals       Exam:  Constitutional: Well appearing, NAD   PSYCH: Appropriate mood and affect  Eyes: Sclera clear, EOMI   Neck: Supple   CV: No tachycardia   RESP: Unlabored breathing   GI: Soft, nontender, non-distended. No abdominal aortic aneurysm noted.   SKIN: No lesions  NEURO: No focal deficits noted   EXTREMITIES: Warm & well perfused. No leg edema. No evidence of arterial ischemia. No evidence of venous insufficiency.   PULSES: " Normal pulse exam throughout.     Imaging Reviewed:  Carotid Artery Duplex Ultrasoun     Narrative  [facility]  [id number] [place] [address]  Tel [phone] and Fax [phone]      Vascular Lab Report  Carotid Artery Duplex Ultrasound      Patient Name:     TENA ARMENDARIZ Reading Physician:  [id number] Gold Bullock MD,  RPVI  Study Date:       9/22/2023    Referring           LALA MONTAGUE  Physician:  MRN/PID:          [id number]     PCP:  Accession/Order#: [id number] CC Report to:  YOB: 1950    Technologist:       Jaylan Bright RVT  Gender:           M            Technologist 2:  Admission Status: Outpatient   Location Performed: [facility]      Diagnosis/ICD: R09.89-Other specified symptoms and signs involving the  circulatory and respiratory systems; Z01.818-Encounter for other  preprocedural examination  Procedure/CPT: 25139 Cerebrovascular Carotid Duplex scan complete-[id number]      **CRITICAL RESULT**  Critical Result: Greater than 70% stenosis of the left proximal ICA.  Notification called to Lala Montague MD on 9/22/2023 at 11:30:00 AM by Jaylan Bright RVT.    CONCLUSIONS:  Right Carotid: Findings are consistent with less than 50% stenosis of the right proximal internal carotid artery. Right external carotid artery appears patent with no evidence of stenosis. The right vertebral artery is patent with antegrade flow. No evidence of hemodynamically significant stenosis in the right subclavian artery.  Left Carotid: Findings are consistent with greater than 70% stenosis of the left proximal internal carotid artery. Turbulent flow seen by color Doppler. Left external carotid artery appears patent with no evidence of stenosis. The vertebral artery demonstrates Tardus Parvus waveforms which might be indicative for a proximal stenosis. No evidence of hemodynamically significant stenosis in the left subclavian artery.    Imaging & Doppler Findings:  Right Plaque Morph:  The mid right common carotid artery demonstrates heterogenous plaque. The right carotid bulb demonstrates heterogenous and calcified plaque.  Left Plaque Morph: The proximal left internal carotid artery demonstrates heterogenous, complex and calcified plaque. The mid left common carotid artery demonstrates heterogenous plaque. The distal left common carotid artery demonstrates heterogenous plaque. The left carotid bulb demonstrates heterogenous and calcified plaque.    Right                        Left  PSV      EDV                PSV      EDV  69 cm/s            CCA P    77 cm/s  62 cm/s            CCA D    67 cm/s  66 cm/s  22 cm/s   ICA P    649 cm/s 311 cm/s  86 cm/s  27 cm/s   ICA M    88 cm/s  38 cm/s  56 cm/s  15 cm/s   ICA D    102 cm/s 39 cm/s  71 cm/s             ECA     101 cm/s  31 cm/s  11 cm/s Vertebral  18 cm/s   8 cm/s  204 cm/s         Subclavian 181 cm/s    Right Left  ICA/CCA Ratio  1.1  9.7        [id number] Gold Bullock MD, RPVI  Electronically signed by [id number] Gold Bullock MD, RPVI on 9/24/2023 at 2:34:56 PM    Assessment & Plan:  1. Carotid stenosis, bilateral  CT angio neck    Creatinine, Serum    DISCONTINUED: clopidogrel (Plavix) 75 mg tablet        Carotid artery stenosis.   Continue aspirin & statin.   Obtain CTA neck.     Orders:  Orders Placed This Encounter   Procedures    CT angio neck    Creatinine, Serum     Seen and discussed with Dr. Erickson Morgan.     Dolores Abdul, APRN-CNP

## 2023-11-15 ENCOUNTER — TELEPHONE (OUTPATIENT)
Dept: VASCULAR MEDICINE | Facility: HOSPITAL | Age: 73
End: 2023-11-15
Payer: MEDICARE

## 2023-11-15 DIAGNOSIS — I65.22 SYMPTOMATIC STENOSIS OF LEFT CAROTID ARTERY: Primary | ICD-10-CM

## 2023-11-15 NOTE — TELEPHONE ENCOUNTER
Spoke with patient regarding pre-op instructions for 11/20/23 surgery including NPO after midnight night prior to surgery; should still take Hydrochlorothiazide, Pirfenidone, Bisoprolol, and Losartan potassium medications with small sip of water the morning of surgery; need for overnight hospital stay, need for transportation; and need for updated bloodwork.  Patient verbalized understanding of all instructions and states he will go to [facility] for labs this week.  Encouraged patient to call with questions/concerns.

## 2023-11-16 DIAGNOSIS — Z76.82 AWAITING ORGAN TRANSPLANT: Primary | ICD-10-CM

## 2023-11-17 ENCOUNTER — PHARMACY VISIT (OUTPATIENT)
Dept: PHARMACY | Facility: CLINIC | Age: 73
End: 2023-11-17
Payer: MEDICARE

## 2023-11-19 ENCOUNTER — ANESTHESIA EVENT (OUTPATIENT)
Dept: OPERATING ROOM | Facility: HOSPITAL | Age: 73
DRG: 038 | End: 2023-11-19
Payer: MEDICARE

## 2023-11-20 ENCOUNTER — HOSPITAL ENCOUNTER (OUTPATIENT)
Dept: NEUROLOGY | Facility: HOSPITAL | Age: 73
Setting detail: SURGERY ADMIT
Discharge: HOME | DRG: 038 | End: 2023-11-20
Payer: MEDICARE

## 2023-11-20 ENCOUNTER — HOSPITAL ENCOUNTER (INPATIENT)
Facility: HOSPITAL | Age: 73
LOS: 6 days | Discharge: HOME | DRG: 038 | End: 2023-11-26
Attending: SURGERY | Admitting: SURGERY
Payer: MEDICARE

## 2023-11-20 ENCOUNTER — ANESTHESIA (OUTPATIENT)
Dept: OPERATING ROOM | Facility: HOSPITAL | Age: 73
DRG: 038 | End: 2023-11-20
Payer: MEDICARE

## 2023-11-20 DIAGNOSIS — I65.22 SYMPTOMATIC STENOSIS OF LEFT CAROTID ARTERY: Primary | ICD-10-CM

## 2023-11-20 DIAGNOSIS — G89.18 ACUTE POST-OPERATIVE PAIN: ICD-10-CM

## 2023-11-20 LAB
ABO GROUP (TYPE) IN BLOOD: NORMAL
ALBUMIN SERPL BCP-MCNC: 4 G/DL (ref 3.4–5)
ANION GAP BLDA CALCULATED.4IONS-SCNC: 10 MMO/L (ref 10–25)
ANION GAP BLDA CALCULATED.4IONS-SCNC: 10 MMO/L (ref 10–25)
ANION GAP BLDA CALCULATED.4IONS-SCNC: 11 MMO/L (ref 10–25)
ANION GAP BLDA CALCULATED.4IONS-SCNC: 11 MMO/L (ref 10–25)
ANION GAP BLDA CALCULATED.4IONS-SCNC: 9 MMO/L (ref 10–25)
ANION GAP SERPL CALC-SCNC: 14 MMOL/L (ref 10–20)
ANTIBODY SCREEN: NORMAL
APTT PPP: 29 SECONDS (ref 27–38)
BASE EXCESS BLDA CALC-SCNC: -2 MMOL/L (ref -2–3)
BASE EXCESS BLDA CALC-SCNC: -2.3 MMOL/L (ref -2–3)
BASE EXCESS BLDA CALC-SCNC: -3.4 MMOL/L (ref -2–3)
BASE EXCESS BLDA CALC-SCNC: -4.3 MMOL/L (ref -2–3)
BASE EXCESS BLDA CALC-SCNC: -4.5 MMOL/L (ref -2–3)
BODY TEMPERATURE: 37 DEGREES CELSIUS
BUN SERPL-MCNC: 13 MG/DL (ref 6–23)
CA-I BLD-SCNC: 1.12 MMOL/L (ref 1.1–1.33)
CA-I BLDA-SCNC: 1.12 MMOL/L (ref 1.1–1.33)
CA-I BLDA-SCNC: 1.14 MMOL/L (ref 1.1–1.33)
CA-I BLDA-SCNC: 1.17 MMOL/L (ref 1.1–1.33)
CA-I BLDA-SCNC: 1.2 MMOL/L (ref 1.1–1.33)
CA-I BLDA-SCNC: 1.22 MMOL/L (ref 1.1–1.33)
CALCIUM SERPL-MCNC: 8.9 MG/DL (ref 8.6–10.6)
CHLORIDE BLDA-SCNC: 106 MMOL/L (ref 98–107)
CHLORIDE BLDA-SCNC: 107 MMOL/L (ref 98–107)
CHLORIDE SERPL-SCNC: 109 MMOL/L (ref 98–107)
CO2 SERPL-SCNC: 24 MMOL/L (ref 21–32)
CREAT SERPL-MCNC: 0.9 MG/DL (ref 0.5–1.3)
ERYTHROCYTE [DISTWIDTH] IN BLOOD BY AUTOMATED COUNT: 13.5 % (ref 11.5–14.5)
GFR SERPL CREATININE-BSD FRML MDRD: >90 ML/MIN/1.73M*2
GLUCOSE BLDA-MCNC: 102 MG/DL (ref 74–99)
GLUCOSE BLDA-MCNC: 105 MG/DL (ref 74–99)
GLUCOSE BLDA-MCNC: 106 MG/DL (ref 74–99)
GLUCOSE BLDA-MCNC: 122 MG/DL (ref 74–99)
GLUCOSE BLDA-MCNC: 96 MG/DL (ref 74–99)
GLUCOSE SERPL-MCNC: 86 MG/DL (ref 74–99)
HCO3 BLDA-SCNC: 21.4 MMOL/L (ref 22–26)
HCO3 BLDA-SCNC: 21.7 MMOL/L (ref 22–26)
HCO3 BLDA-SCNC: 23.7 MMOL/L (ref 22–26)
HCO3 BLDA-SCNC: 24.4 MMOL/L (ref 22–26)
HCO3 BLDA-SCNC: 24.7 MMOL/L (ref 22–26)
HCT VFR BLD AUTO: 47.6 % (ref 41–52)
HCT VFR BLD EST: 45 % (ref 41–52)
HCT VFR BLD EST: 47 % (ref 41–52)
HCT VFR BLD EST: 49 % (ref 41–52)
HCT VFR BLD EST: 50 % (ref 41–52)
HCT VFR BLD EST: 53 % (ref 41–52)
HGB BLD-MCNC: 15.9 G/DL (ref 13.5–17.5)
HGB BLDA-MCNC: 14.9 G/DL (ref 13.5–17.5)
HGB BLDA-MCNC: 15.6 G/DL (ref 13.5–17.5)
HGB BLDA-MCNC: 16.2 G/DL (ref 13.5–17.5)
HGB BLDA-MCNC: 16.8 G/DL (ref 13.5–17.5)
HGB BLDA-MCNC: 17.5 G/DL (ref 13.5–17.5)
INHALED O2 CONCENTRATION: 100 %
INHALED O2 CONCENTRATION: 100 %
INHALED O2 CONCENTRATION: 28 %
INHALED O2 CONCENTRATION: 4 %
INHALED O2 CONCENTRATION: 70 %
INR PPP: 1.3 (ref 0.9–1.1)
LACTATE BLDA-SCNC: 0.8 MMOL/L (ref 0.4–2)
LACTATE BLDA-SCNC: 1 MMOL/L (ref 0.4–2)
LACTATE BLDA-SCNC: 1.1 MMOL/L (ref 0.4–2)
LACTATE BLDA-SCNC: 1.1 MMOL/L (ref 0.4–2)
LACTATE BLDA-SCNC: 1.5 MMOL/L (ref 0.4–2)
MAGNESIUM SERPL-MCNC: 1.88 MG/DL (ref 1.6–2.4)
MCH RBC QN AUTO: 31.8 PG (ref 26–34)
MCHC RBC AUTO-ENTMCNC: 33.4 G/DL (ref 32–36)
MCV RBC AUTO: 95 FL (ref 80–100)
NRBC BLD-RTO: 0 /100 WBCS (ref 0–0)
OXYHGB MFR BLDA: 92.8 % (ref 94–98)
OXYHGB MFR BLDA: 95.2 % (ref 94–98)
OXYHGB MFR BLDA: 95.2 % (ref 94–98)
OXYHGB MFR BLDA: 96.1 % (ref 94–98)
OXYHGB MFR BLDA: 97.1 % (ref 94–98)
PCO2 BLDA: 37 MM HG (ref 38–42)
PCO2 BLDA: 43 MM HG (ref 38–42)
PCO2 BLDA: 44 MM HG (ref 38–42)
PCO2 BLDA: 48 MM HG (ref 38–42)
PCO2 BLDA: 57 MM HG (ref 38–42)
PH BLDA: 7.24 PH (ref 7.38–7.42)
PH BLDA: 7.31 PH (ref 7.38–7.42)
PH BLDA: 7.32 PH (ref 7.38–7.42)
PH BLDA: 7.34 PH (ref 7.38–7.42)
PH BLDA: 7.37 PH (ref 7.38–7.42)
PHOSPHATE SERPL-MCNC: 3.9 MG/DL (ref 2.5–4.9)
PLATELET # BLD AUTO: 154 X10*3/UL (ref 150–450)
PO2 BLDA: 116 MM HG (ref 85–95)
PO2 BLDA: 198 MM HG (ref 85–95)
PO2 BLDA: 287 MM HG (ref 85–95)
PO2 BLDA: 80 MM HG (ref 85–95)
PO2 BLDA: 92 MM HG (ref 85–95)
POTASSIUM BLDA-SCNC: 3.5 MMOL/L (ref 3.5–5.3)
POTASSIUM BLDA-SCNC: 4 MMOL/L (ref 3.5–5.3)
POTASSIUM BLDA-SCNC: 4.2 MMOL/L (ref 3.5–5.3)
POTASSIUM SERPL-SCNC: 4.1 MMOL/L (ref 3.5–5.3)
PROTHROMBIN TIME: 14.4 SECONDS (ref 9.8–12.8)
RBC # BLD AUTO: 5 X10*6/UL (ref 4.5–5.9)
RH FACTOR (ANTIGEN D): NORMAL
SAO2 % BLDA: 100 % (ref 94–100)
SAO2 % BLDA: 100 % (ref 94–100)
SAO2 % BLDA: 96 % (ref 94–100)
SAO2 % BLDA: 98 % (ref 94–100)
SAO2 % BLDA: 99 % (ref 94–100)
SODIUM BLDA-SCNC: 136 MMOL/L (ref 136–145)
SODIUM BLDA-SCNC: 137 MMOL/L (ref 136–145)
SODIUM BLDA-SCNC: 137 MMOL/L (ref 136–145)
SODIUM SERPL-SCNC: 143 MMOL/L (ref 136–145)
WBC # BLD AUTO: 11.3 X10*3/UL (ref 4.4–11.3)

## 2023-11-20 PROCEDURE — 3600000009 HC OR TIME - EACH INCREMENTAL 1 MINUTE - PROCEDURE LEVEL FOUR: Performed by: SURGERY

## 2023-11-20 PROCEDURE — 95940 IONM IN OPERATNG ROOM 15 MIN: CPT

## 2023-11-20 PROCEDURE — 2500000005 HC RX 250 GENERAL PHARMACY W/O HCPCS: Performed by: STUDENT IN AN ORGANIZED HEALTH CARE EDUCATION/TRAINING PROGRAM

## 2023-11-20 PROCEDURE — P9045 ALBUMIN (HUMAN), 5%, 250 ML: HCPCS | Mod: JZ,JG | Performed by: STUDENT IN AN ORGANIZED HEALTH CARE EDUCATION/TRAINING PROGRAM

## 2023-11-20 PROCEDURE — 2720000007 HC OR 272 NO HCPCS: Performed by: SURGERY

## 2023-11-20 PROCEDURE — 85610 PROTHROMBIN TIME: CPT | Performed by: NURSE PRACTITIONER

## 2023-11-20 PROCEDURE — 35301 RECHANNELING OF ARTERY: CPT | Performed by: SURGERY

## 2023-11-20 PROCEDURE — 88311 DECALCIFY TISSUE: CPT | Performed by: PATHOLOGY

## 2023-11-20 PROCEDURE — 83735 ASSAY OF MAGNESIUM: CPT | Performed by: NURSE PRACTITIONER

## 2023-11-20 PROCEDURE — 85347 COAGULATION TIME ACTIVATED: CPT

## 2023-11-20 PROCEDURE — 93010 ELECTROCARDIOGRAM REPORT: CPT | Performed by: INTERNAL MEDICINE

## 2023-11-20 PROCEDURE — 95938 SOMATOSENSORY TESTING: CPT | Performed by: STUDENT IN AN ORGANIZED HEALTH CARE EDUCATION/TRAINING PROGRAM

## 2023-11-20 PROCEDURE — 84295 ASSAY OF SERUM SODIUM: CPT | Performed by: STUDENT IN AN ORGANIZED HEALTH CARE EDUCATION/TRAINING PROGRAM

## 2023-11-20 PROCEDURE — 2500000002 HC RX 250 W HCPCS SELF ADMINISTERED DRUGS (ALT 637 FOR MEDICARE OP, ALT 636 FOR OP/ED): Performed by: STUDENT IN AN ORGANIZED HEALTH CARE EDUCATION/TRAINING PROGRAM

## 2023-11-20 PROCEDURE — 84132 ASSAY OF SERUM POTASSIUM: CPT | Performed by: NURSE PRACTITIONER

## 2023-11-20 PROCEDURE — P9045 ALBUMIN (HUMAN), 5%, 250 ML: HCPCS | Mod: JZ | Performed by: NURSE PRACTITIONER

## 2023-11-20 PROCEDURE — 2500000004 HC RX 250 GENERAL PHARMACY W/ HCPCS (ALT 636 FOR OP/ED): Mod: JZ | Performed by: STUDENT IN AN ORGANIZED HEALTH CARE EDUCATION/TRAINING PROGRAM

## 2023-11-20 PROCEDURE — 0752T DGTZ GLS MCRSCP SLD LVL III: CPT | Mod: TC,SUR | Performed by: NURSE PRACTITIONER

## 2023-11-20 PROCEDURE — 2500000001 HC RX 250 WO HCPCS SELF ADMINISTERED DRUGS (ALT 637 FOR MEDICARE OP): Performed by: NURSE PRACTITIONER

## 2023-11-20 PROCEDURE — 95955 EEG DURING SURGERY: CPT | Performed by: STUDENT IN AN ORGANIZED HEALTH CARE EDUCATION/TRAINING PROGRAM

## 2023-11-20 PROCEDURE — 2780000003 HC OR 278 NO HCPCS: Performed by: SURGERY

## 2023-11-20 PROCEDURE — 3600000004 HC OR TIME - INITIAL BASE CHARGE - PROCEDURE LEVEL FOUR: Performed by: SURGERY

## 2023-11-20 PROCEDURE — 2500000004 HC RX 250 GENERAL PHARMACY W/ HCPCS (ALT 636 FOR OP/ED): Performed by: NURSE PRACTITIONER

## 2023-11-20 PROCEDURE — 85027 COMPLETE CBC AUTOMATED: CPT | Performed by: NURSE PRACTITIONER

## 2023-11-20 PROCEDURE — 86901 BLOOD TYPING SEROLOGIC RH(D): CPT | Performed by: STUDENT IN AN ORGANIZED HEALTH CARE EDUCATION/TRAINING PROGRAM

## 2023-11-20 PROCEDURE — 2500000004 HC RX 250 GENERAL PHARMACY W/ HCPCS (ALT 636 FOR OP/ED): Performed by: STUDENT IN AN ORGANIZED HEALTH CARE EDUCATION/TRAINING PROGRAM

## 2023-11-20 PROCEDURE — P9045 ALBUMIN (HUMAN), 5%, 250 ML: HCPCS | Mod: JZ | Performed by: STUDENT IN AN ORGANIZED HEALTH CARE EDUCATION/TRAINING PROGRAM

## 2023-11-20 PROCEDURE — 3700000002 HC GENERAL ANESTHESIA TIME - EACH INCREMENTAL 1 MINUTE: Performed by: SURGERY

## 2023-11-20 PROCEDURE — 36620 INSERTION CATHETER ARTERY: CPT | Performed by: STUDENT IN AN ORGANIZED HEALTH CARE EDUCATION/TRAINING PROGRAM

## 2023-11-20 PROCEDURE — C9113 INJ PANTOPRAZOLE SODIUM, VIA: HCPCS | Performed by: NURSE PRACTITIONER

## 2023-11-20 PROCEDURE — 36415 COLL VENOUS BLD VENIPUNCTURE: CPT | Performed by: STUDENT IN AN ORGANIZED HEALTH CARE EDUCATION/TRAINING PROGRAM

## 2023-11-20 PROCEDURE — 2500000005 HC RX 250 GENERAL PHARMACY W/O HCPCS: Performed by: SURGERY

## 2023-11-20 PROCEDURE — 83605 ASSAY OF LACTIC ACID: CPT | Performed by: NURSE PRACTITIONER

## 2023-11-20 PROCEDURE — 37799 UNLISTED PX VASCULAR SURGERY: CPT | Performed by: NURSE PRACTITIONER

## 2023-11-20 PROCEDURE — 82330 ASSAY OF CALCIUM: CPT | Performed by: NURSE PRACTITIONER

## 2023-11-20 PROCEDURE — 99100 ANES PT EXTEME AGE<1 YR&>70: CPT | Performed by: STUDENT IN AN ORGANIZED HEALTH CARE EDUCATION/TRAINING PROGRAM

## 2023-11-20 PROCEDURE — A35301 PR THROMBOENDARTECTMY NECK,NECK INCIS: Performed by: STUDENT IN AN ORGANIZED HEALTH CARE EDUCATION/TRAINING PROGRAM

## 2023-11-20 PROCEDURE — 82947 ASSAY GLUCOSE BLOOD QUANT: CPT | Performed by: NURSE PRACTITIONER

## 2023-11-20 PROCEDURE — 86920 COMPATIBILITY TEST SPIN: CPT

## 2023-11-20 PROCEDURE — 88304 TISSUE EXAM BY PATHOLOGIST: CPT | Performed by: PATHOLOGY

## 2023-11-20 PROCEDURE — 03CJ0ZZ EXTIRPATION OF MATTER FROM LEFT COMMON CAROTID ARTERY, OPEN APPROACH: ICD-10-PCS | Performed by: SURGERY

## 2023-11-20 PROCEDURE — A4217 STERILE WATER/SALINE, 500 ML: HCPCS | Performed by: SURGERY

## 2023-11-20 PROCEDURE — 94640 AIRWAY INHALATION TREATMENT: CPT

## 2023-11-20 PROCEDURE — G0453 CONT INTRAOP NEURO MONITOR: HCPCS | Performed by: STUDENT IN AN ORGANIZED HEALTH CARE EDUCATION/TRAINING PROGRAM

## 2023-11-20 PROCEDURE — 3700000001 HC GENERAL ANESTHESIA TIME - INITIAL BASE CHARGE: Performed by: SURGERY

## 2023-11-20 PROCEDURE — 03CN0ZZ EXTIRPATION OF MATTER FROM LEFT EXTERNAL CAROTID ARTERY, OPEN APPROACH: ICD-10-PCS | Performed by: SURGERY

## 2023-11-20 PROCEDURE — 2500000004 HC RX 250 GENERAL PHARMACY W/ HCPCS (ALT 636 FOR OP/ED): Performed by: SURGERY

## 2023-11-20 PROCEDURE — 2020000001 HC ICU ROOM DAILY

## 2023-11-20 RX ORDER — OXYCODONE HYDROCHLORIDE 5 MG/1
10 TABLET ORAL EVERY 6 HOURS PRN
Status: DISCONTINUED | OUTPATIENT
Start: 2023-11-20 | End: 2023-11-26 | Stop reason: HOSPADM

## 2023-11-20 RX ORDER — PROTAMINE SULFATE 10 MG/ML
INJECTION, SOLUTION INTRAVENOUS AS NEEDED
Status: DISCONTINUED | OUTPATIENT
Start: 2023-11-20 | End: 2023-11-20

## 2023-11-20 RX ORDER — PROPOFOL 10 MG/ML
INJECTION, EMULSION INTRAVENOUS AS NEEDED
Status: DISCONTINUED | OUTPATIENT
Start: 2023-11-20 | End: 2023-11-20

## 2023-11-20 RX ORDER — FENTANYL CITRATE 50 UG/ML
INJECTION, SOLUTION INTRAMUSCULAR; INTRAVENOUS AS NEEDED
Status: DISCONTINUED | OUTPATIENT
Start: 2023-11-20 | End: 2023-11-20

## 2023-11-20 RX ORDER — ESMOLOL HYDROCHLORIDE 10 MG/ML
INJECTION INTRAVENOUS AS NEEDED
Status: DISCONTINUED | OUTPATIENT
Start: 2023-11-20 | End: 2023-11-20

## 2023-11-20 RX ORDER — HYDROCHLOROTHIAZIDE 25 MG/1
25 TABLET ORAL EVERY MORNING
Status: DISCONTINUED | OUTPATIENT
Start: 2023-11-21 | End: 2023-11-26 | Stop reason: HOSPADM

## 2023-11-20 RX ORDER — CEFAZOLIN SODIUM 2 G/100ML
2 INJECTION, SOLUTION INTRAVENOUS EVERY 8 HOURS
Status: COMPLETED | OUTPATIENT
Start: 2023-11-20 | End: 2023-11-21

## 2023-11-20 RX ORDER — ACETAMINOPHEN 325 MG/1
650 TABLET ORAL EVERY 4 HOURS PRN
Status: DISCONTINUED | OUTPATIENT
Start: 2023-11-20 | End: 2023-11-20

## 2023-11-20 RX ORDER — BISOPROLOL FUMARATE 5 MG/1
2.5 TABLET, FILM COATED ORAL DAILY
COMMUNITY
Start: 2023-10-10 | End: 2024-03-19 | Stop reason: ALTCHOICE

## 2023-11-20 RX ORDER — PANTOPRAZOLE SODIUM 40 MG/10ML
40 INJECTION, POWDER, LYOPHILIZED, FOR SOLUTION INTRAVENOUS DAILY
Status: DISCONTINUED | OUTPATIENT
Start: 2023-11-20 | End: 2023-11-21

## 2023-11-20 RX ORDER — ALBUTEROL SULFATE 90 UG/1
AEROSOL, METERED RESPIRATORY (INHALATION) AS NEEDED
Status: DISCONTINUED | OUTPATIENT
Start: 2023-11-20 | End: 2023-11-20

## 2023-11-20 RX ORDER — ACETAMINOPHEN 325 MG/1
650 TABLET ORAL EVERY 6 HOURS
Status: DISCONTINUED | OUTPATIENT
Start: 2023-11-20 | End: 2023-11-26 | Stop reason: HOSPADM

## 2023-11-20 RX ORDER — ALBUMIN HUMAN 50 G/1000ML
SOLUTION INTRAVENOUS AS NEEDED
Status: DISCONTINUED | OUTPATIENT
Start: 2023-11-20 | End: 2023-11-20

## 2023-11-20 RX ORDER — HYDROMORPHONE HYDROCHLORIDE 1 MG/ML
0.2 INJECTION, SOLUTION INTRAMUSCULAR; INTRAVENOUS; SUBCUTANEOUS
Status: DISCONTINUED | OUTPATIENT
Start: 2023-11-20 | End: 2023-11-20

## 2023-11-20 RX ORDER — HYDROMORPHONE HYDROCHLORIDE 1 MG/ML
0.5 INJECTION, SOLUTION INTRAMUSCULAR; INTRAVENOUS; SUBCUTANEOUS
Status: DISCONTINUED | OUTPATIENT
Start: 2023-11-20 | End: 2023-11-20

## 2023-11-20 RX ORDER — PHENYLEPHRINE 10 MG/250 ML(40 MCG/ML)IN 0.9 % SOD.CHLORIDE INTRAVENOUS
CONTINUOUS PRN
Status: DISCONTINUED | OUTPATIENT
Start: 2023-11-20 | End: 2023-11-20

## 2023-11-20 RX ORDER — ALBUMIN HUMAN 50 G/1000ML
12.5 SOLUTION INTRAVENOUS ONCE
Status: COMPLETED | OUTPATIENT
Start: 2023-11-20 | End: 2023-11-20

## 2023-11-20 RX ORDER — CHOLECALCIFEROL (VITAMIN D3) 125 MCG
5000 CAPSULE ORAL DAILY
Status: DISCONTINUED | OUTPATIENT
Start: 2023-11-20 | End: 2023-11-21

## 2023-11-20 RX ORDER — DEXTROSE MONOHYDRATE 100 MG/ML
0.3 INJECTION, SOLUTION INTRAVENOUS ONCE AS NEEDED
Status: DISCONTINUED | OUTPATIENT
Start: 2023-11-20 | End: 2023-11-21

## 2023-11-20 RX ORDER — HYDROMORPHONE HYDROCHLORIDE 1 MG/ML
0.5 INJECTION, SOLUTION INTRAMUSCULAR; INTRAVENOUS; SUBCUTANEOUS
Status: DISCONTINUED | OUTPATIENT
Start: 2023-11-20 | End: 2023-11-23

## 2023-11-20 RX ORDER — POLYMYXIN B 500000 [USP'U]/1
INJECTION, POWDER, LYOPHILIZED, FOR SOLUTION INTRAMUSCULAR; INTRATHECAL; INTRAVENOUS; OPHTHALMIC AS NEEDED
Status: DISCONTINUED | OUTPATIENT
Start: 2023-11-20 | End: 2023-11-20 | Stop reason: HOSPADM

## 2023-11-20 RX ORDER — LOSARTAN POTASSIUM 25 MG/1
25 TABLET ORAL DAILY
Status: DISCONTINUED | OUTPATIENT
Start: 2023-11-20 | End: 2023-11-26 | Stop reason: HOSPADM

## 2023-11-20 RX ORDER — CEFAZOLIN SODIUM 2 G/50ML
SOLUTION INTRAVENOUS AS NEEDED
Status: DISCONTINUED | OUTPATIENT
Start: 2023-11-20 | End: 2023-11-20

## 2023-11-20 RX ORDER — ONDANSETRON HYDROCHLORIDE 2 MG/ML
INJECTION, SOLUTION INTRAVENOUS AS NEEDED
Status: DISCONTINUED | OUTPATIENT
Start: 2023-11-20 | End: 2023-11-20

## 2023-11-20 RX ORDER — PROPOFOL 10 MG/ML
INJECTION, EMULSION INTRAVENOUS CONTINUOUS PRN
Status: DISCONTINUED | OUTPATIENT
Start: 2023-11-20 | End: 2023-11-20

## 2023-11-20 RX ORDER — LABETALOL HYDROCHLORIDE 5 MG/ML
10 INJECTION, SOLUTION INTRAVENOUS EVERY 4 HOURS PRN
Status: DISCONTINUED | OUTPATIENT
Start: 2023-11-20 | End: 2023-11-26 | Stop reason: HOSPADM

## 2023-11-20 RX ORDER — SODIUM CHLORIDE 0.9 G/100ML
IRRIGANT IRRIGATION AS NEEDED
Status: DISCONTINUED | OUTPATIENT
Start: 2023-11-20 | End: 2023-11-20 | Stop reason: HOSPADM

## 2023-11-20 RX ORDER — PHENYLEPHRINE HYDROCHLORIDE 10 MG/ML
INJECTION INTRAVENOUS AS NEEDED
Status: DISCONTINUED | OUTPATIENT
Start: 2023-11-20 | End: 2023-11-20

## 2023-11-20 RX ORDER — HEPARIN SODIUM 1000 [USP'U]/ML
INJECTION, SOLUTION INTRAVENOUS; SUBCUTANEOUS AS NEEDED
Status: DISCONTINUED | OUTPATIENT
Start: 2023-11-20 | End: 2023-11-20

## 2023-11-20 RX ORDER — ESCITALOPRAM OXALATE 10 MG/1
5 TABLET ORAL DAILY
Status: DISCONTINUED | OUTPATIENT
Start: 2023-11-20 | End: 2023-11-26 | Stop reason: HOSPADM

## 2023-11-20 RX ORDER — LIDOCAINE HCL/PF 100 MG/5ML
SYRINGE (ML) INTRAVENOUS AS NEEDED
Status: DISCONTINUED | OUTPATIENT
Start: 2023-11-20 | End: 2023-11-20

## 2023-11-20 RX ORDER — BISOPROLOL FUMARATE 5 MG/1
2.5 TABLET, FILM COATED ORAL DAILY
Status: DISCONTINUED | OUTPATIENT
Start: 2023-11-20 | End: 2023-11-26 | Stop reason: HOSPADM

## 2023-11-20 RX ORDER — OXYCODONE HYDROCHLORIDE 5 MG/1
5 TABLET ORAL EVERY 6 HOURS PRN
Status: DISCONTINUED | OUTPATIENT
Start: 2023-11-20 | End: 2023-11-26 | Stop reason: HOSPADM

## 2023-11-20 RX ORDER — INSULIN LISPRO 100 [IU]/ML
0-5 INJECTION, SOLUTION INTRAVENOUS; SUBCUTANEOUS
Status: DISCONTINUED | OUTPATIENT
Start: 2023-11-20 | End: 2023-11-21

## 2023-11-20 RX ORDER — DEXTROSE 50 % IN WATER (D50W) INTRAVENOUS SYRINGE
25
Status: DISCONTINUED | OUTPATIENT
Start: 2023-11-20 | End: 2023-11-21

## 2023-11-20 RX ORDER — NAPROXEN SODIUM 220 MG/1
81 TABLET, FILM COATED ORAL DAILY
Status: DISCONTINUED | OUTPATIENT
Start: 2023-11-20 | End: 2023-11-26 | Stop reason: HOSPADM

## 2023-11-20 RX ORDER — ROCURONIUM BROMIDE 10 MG/ML
INJECTION, SOLUTION INTRAVENOUS AS NEEDED
Status: DISCONTINUED | OUTPATIENT
Start: 2023-11-20 | End: 2023-11-20

## 2023-11-20 RX ORDER — SODIUM CHLORIDE, SODIUM LACTATE, POTASSIUM CHLORIDE, CALCIUM CHLORIDE 600; 310; 30; 20 MG/100ML; MG/100ML; MG/100ML; MG/100ML
5 INJECTION, SOLUTION INTRAVENOUS CONTINUOUS
Status: DISCONTINUED | OUTPATIENT
Start: 2023-11-20 | End: 2023-11-21

## 2023-11-20 RX ADMIN — PROPOFOL 50 MCG/KG/MIN: 10 INJECTION, EMULSION INTRAVENOUS at 11:44

## 2023-11-20 RX ADMIN — ROCURONIUM BROMIDE 20 MG: 10 INJECTION, SOLUTION INTRAVENOUS at 12:05

## 2023-11-20 RX ADMIN — ROCURONIUM BROMIDE 20 MG: 10 INJECTION, SOLUTION INTRAVENOUS at 12:37

## 2023-11-20 RX ADMIN — SUGAMMADEX 400 MG: 100 INJECTION, SOLUTION INTRAVENOUS at 14:16

## 2023-11-20 RX ADMIN — PROTAMINE SULFATE 15 MG: 10 INJECTION, SOLUTION INTRAVENOUS at 13:50

## 2023-11-20 RX ADMIN — FENTANYL CITRATE 50 MCG: 50 INJECTION, SOLUTION INTRAMUSCULAR; INTRAVENOUS at 11:18

## 2023-11-20 RX ADMIN — ALBUTEROL SULFATE 3 PUFF: 90 AEROSOL, METERED RESPIRATORY (INHALATION) at 13:01

## 2023-11-20 RX ADMIN — Medication 0.4 MCG/KG/MIN: at 11:52

## 2023-11-20 RX ADMIN — LIDOCAINE HYDROCHLORIDE 90 MG: 20 INJECTION INTRAVENOUS at 14:30

## 2023-11-20 RX ADMIN — OXYCODONE HYDROCHLORIDE 5 MG: 5 TABLET ORAL at 21:21

## 2023-11-20 RX ADMIN — SODIUM CHLORIDE, SODIUM LACTATE, POTASSIUM CHLORIDE, AND CALCIUM CHLORIDE: 600; 310; 30; 20 INJECTION, SOLUTION INTRAVENOUS at 11:16

## 2023-11-20 RX ADMIN — PHENYLEPHRINE HYDROCHLORIDE 120 MCG: 10 INJECTION INTRAVENOUS at 12:50

## 2023-11-20 RX ADMIN — ALBUMIN HUMAN 12.5 G: 0.05 INJECTION, SOLUTION INTRAVENOUS at 22:44

## 2023-11-20 RX ADMIN — ROCURONIUM BROMIDE 20 MG: 10 INJECTION, SOLUTION INTRAVENOUS at 13:18

## 2023-11-20 RX ADMIN — ROCURONIUM BROMIDE 60 MG: 10 INJECTION, SOLUTION INTRAVENOUS at 11:19

## 2023-11-20 RX ADMIN — LIDOCAINE HYDROCHLORIDE 100 MG: 20 INJECTION INTRAVENOUS at 11:18

## 2023-11-20 RX ADMIN — PROTAMINE SULFATE 50 MG: 10 INJECTION, SOLUTION INTRAVENOUS at 13:45

## 2023-11-20 RX ADMIN — ASPIRIN 81 MG CHEWABLE TABLET 81 MG: 81 TABLET CHEWABLE at 15:41

## 2023-11-20 RX ADMIN — HEPARIN SODIUM 9000 UNITS: 1000 INJECTION, SOLUTION INTRAVENOUS; SUBCUTANEOUS at 12:38

## 2023-11-20 RX ADMIN — ALBUTEROL SULFATE 3 PUFF: 90 AEROSOL, METERED RESPIRATORY (INHALATION) at 13:04

## 2023-11-20 RX ADMIN — ESMOLOL HYDROCHLORIDE 40 MG: 10 INJECTION, SOLUTION INTRAVENOUS at 12:01

## 2023-11-20 RX ADMIN — ONDANSETRON 4 MG: 2 INJECTION INTRAMUSCULAR; INTRAVENOUS at 14:04

## 2023-11-20 RX ADMIN — ALBUMIN (HUMAN) 250 ML: 12.5 SOLUTION INTRAVENOUS at 13:21

## 2023-11-20 RX ADMIN — CEFAZOLIN SODIUM 2 G: 2 SOLUTION INTRAVENOUS at 11:29

## 2023-11-20 RX ADMIN — PANTOPRAZOLE SODIUM 40 MG: 40 INJECTION, POWDER, FOR SOLUTION INTRAVENOUS at 15:41

## 2023-11-20 RX ADMIN — ALBUMIN HUMAN 12.5 G: 0.05 INJECTION, SOLUTION INTRAVENOUS at 18:39

## 2023-11-20 RX ADMIN — ALBUTEROL SULFATE 5 PUFF: 90 AEROSOL, METERED RESPIRATORY (INHALATION) at 13:03

## 2023-11-20 RX ADMIN — PROPOFOL 150 MG: 10 INJECTION, EMULSION INTRAVENOUS at 11:18

## 2023-11-20 RX ADMIN — ACETAMINOPHEN 650 MG: 325 TABLET ORAL at 16:31

## 2023-11-20 RX ADMIN — PHENYLEPHRINE HYDROCHLORIDE 80 MCG: 10 INJECTION INTRAVENOUS at 12:40

## 2023-11-20 RX ADMIN — CEFAZOLIN SODIUM 2 G: 2 INJECTION, SOLUTION INTRAVENOUS at 17:57

## 2023-11-20 RX ADMIN — ACETAMINOPHEN 650 MG: 325 TABLET ORAL at 21:30

## 2023-11-20 RX ADMIN — PHENYLEPHRINE HYDROCHLORIDE 120 MCG: 10 INJECTION INTRAVENOUS at 12:45

## 2023-11-20 RX ADMIN — HYDROMORPHONE HYDROCHLORIDE 0.2 MG: 1 INJECTION, SOLUTION INTRAMUSCULAR; INTRAVENOUS; SUBCUTANEOUS at 15:27

## 2023-11-20 RX ADMIN — PHENYLEPHRINE HYDROCHLORIDE 120 MCG: 10 INJECTION INTRAVENOUS at 11:40

## 2023-11-20 RX ADMIN — HYDROMORPHONE HYDROCHLORIDE 0.5 MG: 1 INJECTION, SOLUTION INTRAMUSCULAR; INTRAVENOUS; SUBCUTANEOUS at 16:32

## 2023-11-20 RX ADMIN — ALBUMIN (HUMAN) 250 ML: 12.5 SOLUTION INTRAVENOUS at 12:49

## 2023-11-20 RX ADMIN — PROPOFOL 50 MG: 10 INJECTION, EMULSION INTRAVENOUS at 14:18

## 2023-11-20 RX ADMIN — SODIUM CHLORIDE, POTASSIUM CHLORIDE, SODIUM LACTATE AND CALCIUM CHLORIDE 5 ML/HR: 600; 310; 30; 20 INJECTION, SOLUTION INTRAVENOUS at 17:53

## 2023-11-20 SDOH — HEALTH STABILITY: MENTAL HEALTH: CURRENT SMOKER: 0

## 2023-11-20 ASSESSMENT — PAIN SCALES - GENERAL
PAINLEVEL_OUTOF10: 7
PAINLEVEL_OUTOF10: 3
PAINLEVEL_OUTOF10: 6
PAINLEVEL_OUTOF10: 7
PAIN_LEVEL: 2
PAINLEVEL_OUTOF10: 4
PAINLEVEL_OUTOF10: 7
PAINLEVEL_OUTOF10: 7
PAINLEVEL_OUTOF10: 0 - NO PAIN

## 2023-11-20 ASSESSMENT — COLUMBIA-SUICIDE SEVERITY RATING SCALE - C-SSRS
6. HAVE YOU EVER DONE ANYTHING, STARTED TO DO ANYTHING, OR PREPARED TO DO ANYTHING TO END YOUR LIFE?: NO
2. HAVE YOU ACTUALLY HAD ANY THOUGHTS OF KILLING YOURSELF?: NO
6. HAVE YOU EVER DONE ANYTHING, STARTED TO DO ANYTHING, OR PREPARED TO DO ANYTHING TO END YOUR LIFE?: NO
1. IN THE PAST MONTH, HAVE YOU WISHED YOU WERE DEAD OR WISHED YOU COULD GO TO SLEEP AND NOT WAKE UP?: NO

## 2023-11-20 ASSESSMENT — PAIN - FUNCTIONAL ASSESSMENT
PAIN_FUNCTIONAL_ASSESSMENT: 0-10

## 2023-11-20 ASSESSMENT — COGNITIVE AND FUNCTIONAL STATUS - GENERAL
MOVING FROM LYING ON BACK TO SITTING ON SIDE OF FLAT BED WITH BEDRAILS: A LITTLE
TURNING FROM BACK TO SIDE WHILE IN FLAT BAD: A LITTLE
WALKING IN HOSPITAL ROOM: A LOT
CLIMB 3 TO 5 STEPS WITH RAILING: A LOT
MOBILITY SCORE: 15
STANDING UP FROM CHAIR USING ARMS: A LOT
MOVING TO AND FROM BED TO CHAIR: A LITTLE

## 2023-11-20 ASSESSMENT — PAIN DESCRIPTION - LOCATION: LOCATION: NECK

## 2023-11-20 ASSESSMENT — PAIN DESCRIPTION - DESCRIPTORS
DESCRIPTORS: PRESSURE
DESCRIPTORS: ACHING

## 2023-11-20 ASSESSMENT — PAIN DESCRIPTION - ORIENTATION: ORIENTATION: LEFT

## 2023-11-20 NOTE — ANESTHESIA PROCEDURE NOTES
Peripheral IV  Date/Time: 11/20/2023 11:31 AM      Placement  Needle size: 16 G  Laterality: right  Location: hand  Local anesthetic: none  Technique: anatomical landmarks  Attempts: 1

## 2023-11-20 NOTE — ANESTHESIA PROCEDURE NOTES
Airway  Date/Time: 11/20/2023 11:21 AM  Urgency: elective    Difficult airway    Staffing  Performed: resident   Authorized by: Kassie Grubbs MD    Performed by: Elin Herrera MD  Patient location during procedure: OR    Indications and Patient Condition  Spontaneous Ventilation: absent  Sedation level: deep  Preoxygenated: yes  Mask difficulty assessment: 2 - vent by mask + OA or adjuvant +/- NMBA  Planned trial extubation    Final Airway Details  Final airway type: endotracheal airway      Successful airway: ETT  Cuffed: yes   Successful intubation technique: direct laryngoscopy  Facilitating devices/methods: intubating stylet  Endotracheal tube insertion site: oral  Blade: Skye  Blade size: #4  ETT size (mm): 8.0  Cormack-Lehane Classification: grade I - full view of glottis  Placement verified by: chest auscultation, capnometry and palpation of cuff   Measured from: lips  ETT to lips (cm): 22  Number of attempts at approach: 1  Number of other approaches attempted: 0

## 2023-11-20 NOTE — ANESTHESIA PREPROCEDURE EVALUATION
Patient: David Block    Procedure Information       Date/Time: 11/20/23 1115    Procedure: Endarterectomy Carotid Artery,with SSEP/EEG monitoring (Left) - with SSEP/EEG monitoring    Location: [facility] OR  / [facility] OR    Surgeons: Erickson Morgan MD            Relevant Problems   Anesthesia (within normal limits)      Cardiovascular  Echo in september 2023 showed normal EF of 60%   (+) Coronary artery disease (LV cath in September 2023 showed 2 vessel disease, currently asymptomatic, no hx of MI, recent surgery without any complications )   (+) HTN (hypertension)   (+) Hypercholesterolemia   (+) Other secondary pulmonary hypertension (CMS/HCC)   (+) Symptomatic stenosis of left carotid artery      Endocrine (within normal limits)      GI   (+) Esophageal reflux      /Renal (within normal limits)      Neuro/Psych   (+) Symptomatic stenosis of left carotid artery      Pulmonary  Severe IPF currently undergoing work up for lung transplant, on 3L/4L NC at home, has been stable, per transplant medicine, he is okay to receive GA for procdedures    (+) Other secondary pulmonary hypertension (CMS/HCC)      GI/Hepatic (within normal limits)      Hematology (within normal limits)      Musculoskeletal   (+) Osteoarthritis of knee      Eyes, Ears, Nose, and Throat   No history of complications Hearing loss      Infectious Disease (within normal limits)       Clinical information reviewed:   Tobacco  Allergies  Meds   Med Hx  Surg Hx   Fam Hx  Soc Hx        NPO Detail:  NPO/Void Status  Date of Last Liquid: 11/19/23  Time of Last Liquid: 1030  Date of Last Solid: 11/19/23  Time of Last Solid: 1800         Physical Exam    Airway  Mallampati: III  TM distance: >3 FB  Neck ROM: full     Cardiovascular   Rhythm: regular  Rate: normal     Dental   (+) upper dentures, lower dentures     Pulmonary - normal exam     Abdominal - normal exam         Anesthesia Plan    ASA 3     general   (I have explained to the  patient the risk of prolonged intubation and possible ICU stay secondary to his severe IPF and he is okay to proceed )  The patient is not a current smoker.  Patient was not previously instructed to abstain from smoking on day of procedure.  Patient did not smoke on day of procedure.    intravenous induction   Postoperative administration of opioids is intended.  Trial extubation is planned.  Anesthetic plan and risks discussed with patient.  Use of blood products discussed with patient who consented to blood products.    Plan discussed with attending.

## 2023-11-20 NOTE — BRIEF OP NOTE
Date: 2023  OR Location: [facility] OR    Name: David Block, : 1950, Age: 73 y.o., MRN: [id number], Sex: male    Diagnosis  Pre-op Diagnosis     * Symptomatic stenosis of left carotid artery [I65.22] Post-op Diagnosis     * Symptomatic stenosis of left carotid artery [I65.22]     Procedures  Endarterectomy Carotid Artery,with SSEP/EEG monitoring  25634 - WI TEAEC W/PATCH GRF CAROTID VERTB SUBCLAV NECK INC      Surgeons      * Erickson Morgan - Primary    Resident/Fellow/Other Assistant:  Surgeon(s) and Role:     * Nikolas Canales MD - Resident - Assisting    Procedure Summary  Anesthesia: General  ASA: III  Anesthesia Staff: Anesthesiologist: Kassie Grubbs MD  Anesthesia Resident: Elin Herrera MD  Estimated Blood Loss: 25 mL  Intra-op Medications:   Medication Name Total Dose   bisoprolol (Zebeta) tablet 2.5 mg Cannot be calculated   cholecalciferol (Vitamin D-3) capsule 125 mcg Cannot be calculated   escitalopram (Lexapro) tablet 5 mg Cannot be calculated   hydroCHLOROthiazide (HYDRODiuril) tablet 25 mg Cannot be calculated   losartan (Cozaar) tablet 25 mg Cannot be calculated              Anesthesia Record               Intraprocedure I/O Totals          Intake    LR 1650.00 mL    Propofol Drip 0.00 mL    The total shown is the total volume documented since Anesthesia Start was filed.    Phenylephrine Drip 0.00 mL    The total shown is the total volume documented since Anesthesia Start was filed.    ceFAZolin (Ancef) IVPB 2 g/50 mL D5 (premix) 10.00 mL    Total Intake 1660 mL       Output    Urine 1095 mL    Est. Blood Loss 25 mL    Total Output 1120 mL       Net    Net Volume 540 mL          Specimen:   ID Type Source Tests Collected by Time   1 : LEFT CAROTID PLAQUE Tissue PLAQUE SURGICAL PATHOLOGY EXAM Erickson Morgan MD 2023 1314        Staff:   Circulator: Denisha Rivera RN; Kristian Ordaz RN  Relief Scrub: Denisha Rivera RN  Scrub Person: Carrie Contreras; Karla Dave RN          Findings:  Severe left carotid stenosis with hemorrhage plaque    Complications:  None; patient tolerated the procedure well.     Disposition: ICU - extubated and stable.  Condition: stable  Specimens Collected:   ID Type Source Tests Collected by Time   1 : LEFT CAROTID PLAQUE Tissue PLAQUE SURGICAL PATHOLOGY EXAM Erickson Morgan MD 11/20/2023 1314     Attending Attestation: I was present and scrubbed for the entire procedure.    Erickson Morgan  Phone Number: [phone]

## 2023-11-20 NOTE — PROGRESS NOTES
Pharmacy Medication History Review    David Block is a 73 y.o. male admitted for Symptomatic stenosis of left carotid artery. Pharmacy reviewed the patient's vgawq-dl-picrkjdsk medications and allergies for accuracy.    The list below reflects the updated PTA list. Comments regarding how patient may be taking medications differently can be found in the Admit Orders Activity  Prior to Admission Medications   Prescriptions Last Dose Informant Patient Reported? Taking?   acetaminophen (Tylenol) 325 mg capsule 11/19/2023  Yes No   Sig: Take 1 capsule (325 mg) by mouth every 6 hours.   aspirin 81 mg capsule 11/19/2023  Yes No   Sig: Take 1 capsule by mouth once daily.   atorvastatin (Lipitor) 80 mg tablet 11/19/2023  Yes No   Sig: Take 1 tablet (80 mg) by mouth once daily.   b complex vitamins capsule 11/19/2023  Yes No   Sig: Take 1 capsule by mouth once daily.   bisoprolol (Zebeta) 5 mg tablet 11/20/2023  Yes Yes   Sig: Take 0.5 tablets (2.5 mg) by mouth once daily.   cholecalciferol (Vitamin D-3) 125 MCG (5000 UT) capsule 11/19/2023  Yes No   Sig: Take 1 capsule (125 mcg) by mouth once daily.   diphenhydrAMINE-acetaminophen (Tylenol PM)  mg per tablet Past Week  Yes No   Sig: Take 1 tablet by mouth as needed at bedtime for sleep.   escitalopram (Lexapro) 5 mg tablet 11/20/2023  Yes No   Sig: Take 1 tablet (5 mg) by mouth once daily.   hydroCHLOROthiazide (HYDRODiuril) 25 mg tablet 11/20/2023  Yes No   Sig: Take 1 tablet (25 mg) by mouth once daily in the morning.   losartan (Cozaar) 25 mg tablet 11/20/2023  Yes No   Sig: Take 1 tablet (25 mg) by mouth once daily.   multivitamin/iron/folic acid (CENTRUM ORAL) 11/19/2023  Yes No   Sig: Take 1 tablet by mouth once daily.   oxygen (O2) gas therapy   Yes No   Sig: Inhale. 4L WITH ACTIVITY; 2L WITH REST AS NEEDED; DOES NOT USE O2 AT NIGHT   pirfenidone (Esbriet) 267 mg tablet tablet   No No   Sig: Take 2.1124 tablets (564 mg) by mouth 3 times a day with meals.       Facility-Administered Medications: None        The list below reflects the updated allergy list. Please review each documented allergy for additional clarification and justification.  Allergies  Reviewed by Stephanie Bautista PharmD on 11/20/2023        Severity Reactions Comments    Nifedipine High Other edema Other Reaction(s): Edema            Patient was unable to be assessed for M2B at discharge. Pharmacy has been updated to [facility]. M2B service not offered prior to surgery, please reassess prior to patient discharge if Meds to Beds is desired.    Sources used to complete the med history include: Patient interview - good historian of medications/ Pharmacy - [facility], [facility],  Specialty/ Chart review    Stephanie Bautista PharmD  Transitions of Care Pharmacist   Meds Ambulatory and Retail Services  Please reach out via Secure Chat for questions, or if no response call [employer] or [facility]

## 2023-11-20 NOTE — OP NOTE
Endarterectomy Carotid Artery,with SSEP/EEG monitoring (L) Operative Note     Date: 2023  OR Location: [facility] OR    Name: David Block, : 1950, Age: 73 y.o., MRN: [id number], Sex: male    Diagnosis  Pre-op Diagnosis     * Symptomatic stenosis of left carotid artery [I65.22] Post-op Diagnosis     * Symptomatic stenosis of left carotid artery [I65.22]     Procedures  Endarterectomy Carotid Artery,with SSEP/EEG monitoring  48609 - AL TEAEC W/PATCH GRF CAROTID VERTB SUBCLAV NECK INC      Surgeons      * Erickson Morgan - Primary    Resident/Fellow/Other Assistant:  Surgeon(s) and Role:     * Nikolas Canales MD - Resident - Assisting     * Janusz Muro MD - Resident - Assisting    Procedure Summary  Anesthesia: General  ASA: III  Anesthesia Staff: Anesthesiologist: Kassie Grubbs MD  Anesthesia Resident: Elin Herrera MD  Estimated Blood Loss: 50 mL  Intra-op Medications: * No intraprocedure medications in log *           Anesthesia Record               Intraprocedure I/O Totals          Intake    Propofol Drip 0.00 mL    The total shown is the total volume documented since Anesthesia Start was filed.    Phenylephrine Drip 0.00 mL    The total shown is the total volume documented since Anesthesia Start was filed.    ceFAZolin (Ancef) IVPB 2 g/50 mL D5 (premix) 10.00 mL    Total Intake 10 mL       Output    Urine 920 mL    Total Output 920 mL       Net    Net Volume -910 mL          Specimen:   ID Type Source Tests Collected by Time   1 : LEFT CAROTID PLAQUE Tissue PLAQUE SURGICAL PATHOLOGY EXAM Erickson Morgan MD 2023 1314        Staff:   Circulator: Denisha Rivera RN; Kristian Ordaz RN  Relief Scrub: Denisha Rivera RN  Scrub Person: Carrie Contreras; Karla Dave RN         Drains and/or Catheters:   Urethral Catheter Non-latex 16 Fr. (Active)       Tourniquet Times:         Implants:  Implants              Findings: Severe stenosis of left internal carotid artery     Indications: David Block is  an 73 y.o. male who is having surgery for Symptomatic stenosis of left carotid artery [I65.22] consisting of atypical blurry vision. He now presents for CEA in preparation for lung transplantation.    Procedure Details: After a plane of anesthesia was obtained, the patient was prepared and draped in the usual sterile fashion. A 5 cm long incision was made along the anterior border of the left sternocleidomastoid muscle and the dissection carried through the subcutaneous tissue. The platysma was divided and the internal jugular vein retracted laterally. The facial vein was divided and the underlying carotid arteries identified. The ansa cervicalis was divided to facilitate the exposure of the arteries. The hypoglossal and the vagus nerves were identified and preserved. The glossopharyngeal nerve was not visualized.    Following systermic heparinization, control was obtained of the internal, common and external carotid arteries in that order. No SSEP/EEG changes were noted in the first 3 minutes. The internal carotid artery was transected at its origin and eversion endarterectomy effected in the usual fashion with a nice transition zone in the internal carotid artery.     Endarterectomy of the common and external carotid arteries were performed in the standard fashion. The common carotid arteriotomy was extended proximally by about 1.5 cm to facilitate common carotid endarterectomy. The internal was then reimplanted onto its origin with a running 7-0 Prolene suture. After appropriate foreward bleeding and back-bleeding were allowed, flow was restored to the external and internal carotid arteries in that order. The anastomosis was hemostatic.     Heparin effects were reversed with Protamine.  After hemostasis was assured, platysma and the skin were closed in layers.     The patient awoke in the operating room moving all four extremities and following commands.    The patient's respiratory status was tenuous during the  procedure and required 100% FiO2 and bronchoscopy. The patient will be taken to the ICU for monitoring.    Complications:  None; patient tolerated the procedure well.    Disposition: ICU - extubated and stable.  Condition: stable       Attending Attestation: I was present and scrubbed for the entire procedure.    Erickson Morgan  Phone Number: [phone]

## 2023-11-20 NOTE — H&P
"History Of Present Illness  David Block is a 73 y.o. RHD male here for initial evaluation. He has idiopathic pulmonary fibrosis and is being evaluated for a lung transplant. He was found to have severe left carotid artery stenosis on recent carotid duplex ultrasound with a PSV/EDV of 649/311 and a ratio of 9.7. He reports left eye \"blurriness\" that started a month ago. These episodes occur once a day and last less than 30 seconds in duration. He states the blurriness resolves after he blinks a few times. He denies unilateral weakness or speech impairments. He has no history of neck surgery or radiation to the neck.      He quit smoking 13 years ago. He is on aspirin and a high-intensity statin.      Recent coronary catheterization performed 9/2023 demonstrates 2-vessel disease. He denies chest pain. He does have dyspnea with exertion and wears oxygen as needed.      He denies any known family history of aneurysmal disease. His weight has been stable. He has no history of DM or CVA.      Past Medical History  PMH: HTN, HLD, IPF (PRN oxygen), carotid artery stenosis    Surgical History  Past Surgical History:   Procedure Laterality Date    CT ANGIO NECK  11/13/2023    CT ANGIO NECK 11/13/2023 [facility]     Social History  He reports that he quit smoking about 13 years ago. His smoking use included cigarettes. He has never used smokeless tobacco. No history on file for alcohol use and drug use.    Family History  Family History   Problem Relation Name Age of Onset    Heart attack Father          Allergies  Nifedipine    Physical Exam   Constitutional: Well appearing, NAD   PSYCH: Appropriate mood and affect  Eyes: Sclera clear, EOMI   Neck: Supple   CV: No tachycardia   RESP: Unlabored breathing   GI: Soft, nontender, non-distended. No abdominal aortic aneurysm noted.   SKIN: No lesions  NEURO: No focal deficits noted   EXTREMITIES: Warm & well perfused. No leg edema. No evidence of arterial ischemia. No evidence of " venous insufficiency.   PULSES: Normal pulse exam throughout.         Relevant Results  Imaging Reviewed:  Carotid Artery Duplex Ultrasoun      Narrative  [place] [address]  Tel [phone] and Fax [phone]        Vascular Lab Report  Carotid Artery Duplex Ultrasound        Patient Name:     TENA ARMENDARIZ Reading Physician:  [id number] Gold Bullock MD,  RPVI  Study Date:       9/22/2023    Referring           LALA MONTAGUE  Physician:  MRN/PID:          [id number]     PCP:  Accession/Order#: [id number] CC Report to:  YOB: 1950    Technologist:       Jaylan Bright RVT  Gender:           M            Technologist 2:  Admission Status: Outpatient   Location Performed: [facility]        Diagnosis/ICD: R09.89-Other specified symptoms and signs involving the  circulatory and respiratory systems; Z01.818-Encounter for other  preprocedural examination  Procedure/CPT: 62521 Cerebrovascular Carotid Duplex scan complete-08609        **CRITICAL RESULT**  Critical Result: Greater than 70% stenosis of the left proximal ICA.  Notification called to Lala Montague MD on 9/22/2023 at 11:30:00 AM by Jaylan Bright RVT.     CONCLUSIONS:  Right Carotid: Findings are consistent with less than 50% stenosis of the right proximal internal carotid artery. Right external carotid artery appears patent with no evidence of stenosis. The right vertebral artery is patent with antegrade flow. No evidence of hemodynamically significant stenosis in the right subclavian artery.  Left Carotid: Findings are consistent with greater than 70% stenosis of the left proximal internal carotid artery. Turbulent flow seen by color Doppler. Left external carotid artery appears patent with no evidence of stenosis. The vertebral artery demonstrates Tardus Parvus waveforms which might be indicative for a proximal stenosis. No evidence of hemodynamically significant stenosis in the left  subclavian artery.     Imaging & Doppler Findings:  Right Plaque Morph: The mid right common carotid artery demonstrates heterogenous plaque. The right carotid bulb demonstrates heterogenous and calcified plaque.  Left Plaque Morph: The proximal left internal carotid artery demonstrates heterogenous, complex and calcified plaque. The mid left common carotid artery demonstrates heterogenous plaque. The distal left common carotid artery demonstrates heterogenous plaque. The left carotid bulb demonstrates heterogenous and calcified plaque.     Right                        Left  PSV      EDV                PSV      EDV  69 cm/s            CCA P    77 cm/s  62 cm/s            CCA D    67 cm/s  66 cm/s  22 cm/s   ICA P    649 cm/s 311 cm/s  86 cm/s  27 cm/s   ICA M    88 cm/s  38 cm/s  56 cm/s  15 cm/s   ICA D    102 cm/s 39 cm/s  71 cm/s             ECA     101 cm/s  31 cm/s  11 cm/s Vertebral  18 cm/s   8 cm/s  204 cm/s         Subclavian 181 cm/s     Right Left  ICA/CCA Ratio  1.1  9.7           [id number] Gold Bullock MD, RPVI  Electronically signed by [id number] Gold Bullock MD, RPVI on 9/24/2023 at 2:34:56 PM  Assessment/Plan   Principal Problem:    Symptomatic stenosis of left carotid artery    Plan:  - OR today for CEA, with SSEP/EEG Monitoring    Olive Sadler PGY1  Vascular Surgery [phone]          Olive Sadler MD

## 2023-11-20 NOTE — ANESTHESIA POSTPROCEDURE EVALUATION
Patient: David Block    Procedure Summary       Date: 11/20/23 Room / Location: [facility] OR 16 / Virtual [facility] OR    Anesthesia Start: 1100 Anesthesia Stop: 1459    Procedure: Endarterectomy Carotid Artery,with SSEP/EEG monitoring (Left) Diagnosis:       Symptomatic stenosis of left carotid artery      (Symptomatic stenosis of left carotid artery [I65.22])    Surgeons: Erickson Morgan MD Responsible Provider: Kassie Grubbs MD    Anesthesia Type: general ASA Status: 3            Anesthesia Type: general    Vitals Value Taken Time   /56 11/20/23 1459   Temp 36.2 °C (97.2 °F) 11/20/23 1445   Pulse 67 11/20/23 1459   Resp 17 11/20/23 1459   SpO2 100 % 11/20/23 1459   Vitals shown include unvalidated device data.    Anesthesia Post Evaluation    Patient location during evaluation: ICU  Patient participation: complete - patient participated  Level of consciousness: awake and alert  Pain score: 2  Pain management: adequate  Airway patency: patent  Cardiovascular status: acceptable and hemodynamically stable  Respiratory status: acceptable and face mask  Hydration status: acceptable  Postoperative Nausea and Vomiting: none        No notable events documented.

## 2023-11-20 NOTE — H&P
History Of Present Illness  David Block is a 73 y.o. male presenting with a history idiopathic pulmonary fibrosis and is being evaluated for a lung transplant. He was found to have severe left carotid artery stenosis on recent carotid duplex ultrasound with a PSV/EDV of 649/311 and a ratio of 9.7. Presents s/p left carotid endarterectomy by Dr. Morgan on 11/20/2023. EBL 50 ml. Reported to have significant oozing. Extubated in OR. Arrives to SICU on 6L NC. Alert and oriented with no focal deficit. Neck at surgical site is soft.     PMH: HTN, HLD, IPF (PRN oxygen), carotid artery stenosis    Past Surgical History:    CT ANGIO NECK   11/13/2023    CT ANGIO NECK 11/13/2023 [facility]       Social History:  quit smoking about 13 years ago. His smoking use included cigarettes. He has never used smokeless tobacco. No history on file for alcohol use and drug use.     Allergies: Nifedipine    --------------------------------------------------------------------------------------------------    Assessment and Plan  - Status post left carotid endarterectomy on 11/20/2023    Neuro:  -Frequent neuro checks to assess for cranial nerve injury.   -Anelgesia - Tylenol for pain control, dilaudid IV if needed  -HOB > 30-degrees to promote venous drainage and reduces intracranial pressure and until pharyngeal reflex appropriate.  -Hold home escitalopram    CV: History of HTN, HLD, and carotid artery stenosis. Recent coronary catheterization performed 9/2023 demonstrates 2-vessel disease. Hx of severe left carotid artery stenosis s/p carotid endarectomy by Dr Morgan on 11/20/2023.  -Monitor hemodynamics with arterial line, Goals: MAP > 65, -150, DBP < 100  -Consider pain control first if HTN  -Monitor for HTN secondary to disruption of cerebral autoregulation and treat first with labetalol PRN  -Monitor for hypotension secondary to residual carotid hypersensitivity after plaque excision and first give small fluid bolus then add  phenylephrine IV infusion if needed  -Monitor for surgical site hematoma and ULISES drainage for increase in volume or change in drainage color  -Resume home ASA 81mg today per vascular  -no plavix today  -resume atorvastatin 80mg tomorrow morning if OK with surgical team  -Hold home bisoprolol, HTCZ, losartan    Pulmonary: History idiopathic pulmonary fibrosis and is being evaluated for a lung transplant. Per chart review patient on 4L NC with activity at home, 2L with rest PRN, no O2 at night. Extubated in OR.   -Aggressive pulmonary toileting, encourage incentive spirometer  -if needed, O2 via nasal cannula or face mask to keep SPO2 >= 92%, no supplemental O2 if SpO2 > 97%  - hold home Pirfenidone for now (treatment of idiopathic pulmonary fibrosis)    GI: Abdomen soft and nontender.  -Diet: Start with clears in 4 hrs if no issues  -Start bowel regimen when advancing diet tomorrow  -GI prophylaxis with PPI    : Baseline creatinine 0.9.   -Maintain lopes catheter, monitor I/O's, can DC in am or at midnight.   -Monitor renal function, will check BMP  -Monitor and optimize electrolytes, keep potassium >4.0, magnesium > 2.0  -MIV fluids LR at 75 ml/hr x 24hr, DC when taking orals    Heme: Baseline HGB 18-19.   -Monitor HH - will check CBC, coags now and again in the a.m.    VTE prophylaxis:   -SCDs    Endo: No history of diabetes or other endocrine disorder.  -Monitor blood glucose    ID: Afebrile.  -Antibiotics: Postop cefazolin × 24hrs  -Will monitor for fever, WBCs, and incision site for signs of infection.    Disposition: Admit to the intensive care unit. Critical care will follow. Discussed with Dr. Canales.      Past Medical History  Past Medical History:   Diagnosis Date    BPH (benign prostatic hyperplasia)     Cancer of cheek (CMS/HCC)     Hyperlipidemia     Hypertension     Idiopathic pulmonary fibrosis (CMS/HCC)        Surgical History  Past Surgical History:   Procedure Laterality Date    BACK SURGERY       "COLONOSCOPY      CT ANGIO NECK  11/13/2023    CT ANGIO NECK 11/13/2023 [facility]    CYSTOSCOPY      KNEE SURGERY          Social History  He reports that he quit smoking about 13 years ago. His smoking use included cigarettes. He has never used smokeless tobacco. No history on file for alcohol use and drug use.    Family History  Family History   Problem Relation Name Age of Onset    Heart attack Father          Allergies  Nifedipine    Review of Systems     Physical Exam     Last Recorded Vitals  Blood pressure (!) 139/48, pulse 66, temperature 36.2 °C (97.2 °F), temperature source Temporal, resp. rate 24, height 1.702 m (5' 7\"), weight 92.2 kg (203 lb 4.2 oz), SpO2 100 %.    Relevant Results  Scheduled medications  aspirin, 81 mg, oral, Daily  [Held by provider] bisoprolol, 2.5 mg, oral, Daily  ceFAZolin, 2 g, intravenous, q8h  [Held by provider] cholecalciferol, 5,000 Units, oral, Daily  [Held by provider] escitalopram, 5 mg, oral, Daily  [Held by provider] hydroCHLOROthiazide, 25 mg, oral, q AM  [Held by provider] losartan, 25 mg, oral, Daily  pantoprazole, 40 mg, intravenous, Daily      Continuous medications  lactated Ringer's, 75 mL/hr      PRN medications  PRN medications: acetaminophen, HYDROmorphone, labetaloL, oxygen    Results for orders placed or performed during the hospital encounter of 11/20/23 (from the past 24 hour(s))   Type And Screen   Result Value Ref Range    ABO TYPE A     Rh TYPE POS     ANTIBODY SCREEN NEG    Blood Gas Arterial Full Panel   Result Value Ref Range    POCT pH, Arterial 7.32 (L) 7.38 - 7.42 pH    POCT pCO2, Arterial 48 (H) 38 - 42 mm Hg    POCT pO2, Arterial 198 (H) 85 - 95 mm Hg    POCT SO2, Arterial 100 94 - 100 %    POCT Oxy Hemoglobin, Arterial 96.1 94.0 - 98.0 %    POCT Hematocrit Calculated, Arterial 50.0 41.0 - 52.0 %    POCT Sodium, Arterial 137 136 - 145 mmol/L    POCT Potassium, Arterial 4.0 3.5 - 5.3 mmol/L    POCT Chloride, Arterial 107 98 - 107 mmol/L    POCT Ionized " Calcium, Arterial 1.22 1.10 - 1.33 mmol/L    POCT Glucose, Arterial 105 (H) 74 - 99 mg/dL    POCT Lactate, Arterial 1.1 0.4 - 2.0 mmol/L    POCT Base Excess, Arterial -2.0 -2.0 - 3.0 mmol/L    POCT HCO3 Calculated, Arterial 24.7 22.0 - 26.0 mmol/L    POCT Hemoglobin, Arterial 16.8 13.5 - 17.5 g/dL    POCT Anion Gap, Arterial 9 (L) 10 - 25 mmo/L    Patient Temperature 37.0 degrees Celsius    FiO2 70 %   Blood Gas Arterial Full Panel   Result Value Ref Range    POCT pH, Arterial 7.24 (LL) 7.38 - 7.42 pH    POCT pCO2, Arterial 57 (H) 38 - 42 mm Hg    POCT pO2, Arterial 116 (H) 85 - 95 mm Hg    POCT SO2, Arterial 99 94 - 100 %    POCT Oxy Hemoglobin, Arterial 95.2 94.0 - 98.0 %    POCT Hematocrit Calculated, Arterial 53.0 (H) 41.0 - 52.0 %    POCT Sodium, Arterial 136 136 - 145 mmol/L    POCT Potassium, Arterial 4.2 3.5 - 5.3 mmol/L    POCT Chloride, Arterial 106 98 - 107 mmol/L    POCT Ionized Calcium, Arterial 1.20 1.10 - 1.33 mmol/L    POCT Glucose, Arterial 106 (H) 74 - 99 mg/dL    POCT Lactate, Arterial 1.0 0.4 - 2.0 mmol/L    POCT Base Excess, Arterial -4.3 (L) -2.0 - 3.0 mmol/L    POCT HCO3 Calculated, Arterial 24.4 22.0 - 26.0 mmol/L    POCT Hemoglobin, Arterial 17.5 13.5 - 17.5 g/dL    POCT Anion Gap, Arterial 10 10 - 25 mmo/L    Patient Temperature 37.0 degrees Celsius    FiO2 100 %   Blood Gas Arterial Full Panel   Result Value Ref Range    POCT pH, Arterial 7.31 (L) 7.38 - 7.42 pH    POCT pCO2, Arterial 43 (H) 38 - 42 mm Hg    POCT pO2, Arterial 287 (H) 85 - 95 mm Hg    POCT SO2, Arterial 100 94 - 100 %    POCT Oxy Hemoglobin, Arterial 97.1 94.0 - 98.0 %    POCT Hematocrit Calculated, Arterial 49.0 41.0 - 52.0 %    POCT Sodium, Arterial 136 136 - 145 mmol/L    POCT Potassium, Arterial 4.0 3.5 - 5.3 mmol/L    POCT Chloride, Arterial 107 98 - 107 mmol/L    POCT Ionized Calcium, Arterial 1.14 1.10 - 1.33 mmol/L    POCT Glucose, Arterial 102 (H) 74 - 99 mg/dL    POCT Lactate, Arterial 0.8 0.4 - 2.0 mmol/L     POCT Base Excess, Arterial -4.5 (L) -2.0 - 3.0 mmol/L    POCT HCO3 Calculated, Arterial 21.7 (L) 22.0 - 26.0 mmol/L    POCT Hemoglobin, Arterial 16.2 13.5 - 17.5 g/dL    POCT Anion Gap, Arterial 11 10 - 25 mmo/L    Patient Temperature 37.0 degrees Celsius    FiO2 100 %        Assessment/Plan   Principal Problem:    Symptomatic stenosis of left carotid artery      I spent 55 minutes in the professional and overall care of this patient.      Griffin Cook, APRN-CNP, DNP

## 2023-11-20 NOTE — ANESTHESIA PROCEDURE NOTES
Peripheral IV  Date/Time: 11/20/2023 11:10 AM      Placement  Needle size: 20 G  Laterality: left  Location: hand  Local anesthetic: none  Site prep: chlorhexidine  Technique: anatomical landmarks  Attempts: 1

## 2023-11-20 NOTE — ANESTHESIA PROCEDURE NOTES
Arterial Line:    Date/Time: 11/20/2023 11:30 AM    Staffing  Performed: resident   Authorized by: Kassie Grubbs MD    Performed by: Elin Herrera MD    An arterial line was placed. Procedure performed using surface landmarks.in the OR for the following indication(s): continuous blood pressure monitoring and blood sampling needed.    A 22 gauge (size), 1 and 3/8 inch (length) (type) catheter was placed into the Right radial artery, secured by Tegaderm,   Seldinger technique used.  Events:  patient tolerated procedure well with no complications.

## 2023-11-21 ENCOUNTER — PHARMACY VISIT (OUTPATIENT)
Dept: PHARMACY | Facility: CLINIC | Age: 73
End: 2023-11-21
Payer: MEDICARE

## 2023-11-21 ENCOUNTER — APPOINTMENT (OUTPATIENT)
Dept: CARDIOLOGY | Facility: HOSPITAL | Age: 73
DRG: 038 | End: 2023-11-21
Payer: MEDICARE

## 2023-11-21 ENCOUNTER — APPOINTMENT (OUTPATIENT)
Dept: RADIOLOGY | Facility: HOSPITAL | Age: 73
DRG: 038 | End: 2023-11-21
Payer: MEDICARE

## 2023-11-21 DIAGNOSIS — I65.22 CAROTID STENOSIS, SYMPTOMATIC W/O INFARCT, LEFT: Primary | ICD-10-CM

## 2023-11-21 LAB
ALBUMIN SERPL BCP-MCNC: 4.1 G/DL (ref 3.4–5)
ANION GAP SERPL CALC-SCNC: 13 MMOL/L (ref 10–20)
ANION GAP SERPL CALC-SCNC: 16 MMOL/L (ref 10–20)
APTT PPP: 28 SECONDS (ref 27–38)
ATRIAL RATE: 61 BPM
BUN SERPL-MCNC: 10 MG/DL (ref 6–23)
BUN SERPL-MCNC: 10 MG/DL (ref 6–23)
CA-I BLD-SCNC: 1.12 MMOL/L (ref 1.1–1.33)
CALCIUM SERPL-MCNC: 8.9 MG/DL (ref 8.6–10.6)
CALCIUM SERPL-MCNC: 9.4 MG/DL (ref 8.6–10.6)
CHLORIDE SERPL-SCNC: 102 MMOL/L (ref 98–107)
CHLORIDE SERPL-SCNC: 108 MMOL/L (ref 98–107)
CO2 SERPL-SCNC: 25 MMOL/L (ref 21–32)
CO2 SERPL-SCNC: 27 MMOL/L (ref 21–32)
CREAT SERPL-MCNC: 0.76 MG/DL (ref 0.5–1.3)
CREAT SERPL-MCNC: 0.81 MG/DL (ref 0.5–1.3)
ERYTHROCYTE [DISTWIDTH] IN BLOOD BY AUTOMATED COUNT: 13.5 % (ref 11.5–14.5)
GFR SERPL CREATININE-BSD FRML MDRD: >90 ML/MIN/1.73M*2
GFR SERPL CREATININE-BSD FRML MDRD: >90 ML/MIN/1.73M*2
GLUCOSE BLD MANUAL STRIP-MCNC: 122 MG/DL (ref 74–99)
GLUCOSE BLD MANUAL STRIP-MCNC: 148 MG/DL (ref 74–99)
GLUCOSE SERPL-MCNC: 76 MG/DL (ref 74–99)
GLUCOSE SERPL-MCNC: 87 MG/DL (ref 74–99)
HCT VFR BLD AUTO: 43.2 % (ref 41–52)
HGB BLD-MCNC: 14.2 G/DL (ref 13.5–17.5)
HOLD SPECIMEN: NORMAL
HOLD SPECIMEN: NORMAL
INR PPP: 1.3 (ref 0.9–1.1)
MAGNESIUM SERPL-MCNC: 1.92 MG/DL (ref 1.6–2.4)
MCH RBC QN AUTO: 31 PG (ref 26–34)
MCHC RBC AUTO-ENTMCNC: 32.9 G/DL (ref 32–36)
MCV RBC AUTO: 94 FL (ref 80–100)
NRBC BLD-RTO: 0 /100 WBCS (ref 0–0)
P AXIS: 57 DEGREES
P OFFSET: 194 MS
P ONSET: 135 MS
PHOSPHATE SERPL-MCNC: 4.2 MG/DL (ref 2.5–4.9)
PLATELET # BLD AUTO: 151 X10*3/UL (ref 150–450)
POTASSIUM SERPL-SCNC: 3.9 MMOL/L (ref 3.5–5.3)
POTASSIUM SERPL-SCNC: 4.4 MMOL/L (ref 3.5–5.3)
PR INTERVAL: 176 MS
PROTHROMBIN TIME: 14.6 SECONDS (ref 9.8–12.8)
Q ONSET: 223 MS
QRS COUNT: 10 BEATS
QRS DURATION: 72 MS
QT INTERVAL: 416 MS
QTC CALCULATION(BAZETT): 418 MS
QTC FREDERICIA: 418 MS
R AXIS: 76 DEGREES
RBC # BLD AUTO: 4.58 X10*6/UL (ref 4.5–5.9)
SODIUM SERPL-SCNC: 141 MMOL/L (ref 136–145)
SODIUM SERPL-SCNC: 142 MMOL/L (ref 136–145)
T AXIS: 41 DEGREES
T OFFSET: 431 MS
VENTRICULAR RATE: 61 BPM
WBC # BLD AUTO: 10 X10*3/UL (ref 4.4–11.3)

## 2023-11-21 PROCEDURE — 80069 RENAL FUNCTION PANEL: CPT | Performed by: NURSE PRACTITIONER

## 2023-11-21 PROCEDURE — 71045 X-RAY EXAM CHEST 1 VIEW: CPT | Performed by: RADIOLOGY

## 2023-11-21 PROCEDURE — RXMED WILLOW AMBULATORY MEDICATION CHARGE

## 2023-11-21 PROCEDURE — 85730 THROMBOPLASTIN TIME PARTIAL: CPT | Performed by: NURSE PRACTITIONER

## 2023-11-21 PROCEDURE — 37799 UNLISTED PX VASCULAR SURGERY: CPT | Performed by: NURSE PRACTITIONER

## 2023-11-21 PROCEDURE — 80048 BASIC METABOLIC PNL TOTAL CA: CPT | Mod: CCI | Performed by: NURSE PRACTITIONER

## 2023-11-21 PROCEDURE — 92610 EVALUATE SWALLOWING FUNCTION: CPT | Mod: GN

## 2023-11-21 PROCEDURE — 83735 ASSAY OF MAGNESIUM: CPT | Performed by: NURSE PRACTITIONER

## 2023-11-21 PROCEDURE — 96372 THER/PROPH/DIAG INJ SC/IM: CPT | Performed by: NURSE PRACTITIONER

## 2023-11-21 PROCEDURE — 99233 SBSQ HOSP IP/OBS HIGH 50: CPT | Performed by: NURSE PRACTITIONER

## 2023-11-21 PROCEDURE — 71045 X-RAY EXAM CHEST 1 VIEW: CPT | Mod: FY

## 2023-11-21 PROCEDURE — 2500000004 HC RX 250 GENERAL PHARMACY W/ HCPCS (ALT 636 FOR OP/ED): Performed by: NURSE PRACTITIONER

## 2023-11-21 PROCEDURE — 1100000001 HC PRIVATE ROOM DAILY

## 2023-11-21 PROCEDURE — 2500000001 HC RX 250 WO HCPCS SELF ADMINISTERED DRUGS (ALT 637 FOR MEDICARE OP): Performed by: NURSE PRACTITIONER

## 2023-11-21 PROCEDURE — C9113 INJ PANTOPRAZOLE SODIUM, VIA: HCPCS | Performed by: NURSE PRACTITIONER

## 2023-11-21 PROCEDURE — 82330 ASSAY OF CALCIUM: CPT | Performed by: NURSE PRACTITIONER

## 2023-11-21 PROCEDURE — 82947 ASSAY GLUCOSE BLOOD QUANT: CPT

## 2023-11-21 PROCEDURE — 85610 PROTHROMBIN TIME: CPT | Performed by: NURSE PRACTITIONER

## 2023-11-21 PROCEDURE — 85027 COMPLETE CBC AUTOMATED: CPT | Performed by: NURSE PRACTITIONER

## 2023-11-21 PROCEDURE — 93005 ELECTROCARDIOGRAM TRACING: CPT

## 2023-11-21 RX ORDER — HEPARIN SODIUM 5000 [USP'U]/ML
5000 INJECTION, SOLUTION INTRAVENOUS; SUBCUTANEOUS EVERY 8 HOURS
Status: DISCONTINUED | OUTPATIENT
Start: 2023-11-21 | End: 2023-11-26 | Stop reason: HOSPADM

## 2023-11-21 RX ORDER — OXYCODONE HYDROCHLORIDE 5 MG/1
5 TABLET ORAL EVERY 6 HOURS PRN
Qty: 15 TABLET | Refills: 0 | Status: SHIPPED | OUTPATIENT
Start: 2023-11-21 | End: 2023-11-30

## 2023-11-21 RX ORDER — MAGNESIUM SULFATE HEPTAHYDRATE 40 MG/ML
2 INJECTION, SOLUTION INTRAVENOUS ONCE
Status: COMPLETED | OUTPATIENT
Start: 2023-11-21 | End: 2023-11-21

## 2023-11-21 RX ORDER — CHOLECALCIFEROL (VITAMIN D3) 125 MCG
5000 CAPSULE ORAL DAILY
Status: DISCONTINUED | OUTPATIENT
Start: 2023-11-22 | End: 2023-11-23

## 2023-11-21 RX ADMIN — ACETAMINOPHEN 650 MG: 325 TABLET ORAL at 04:30

## 2023-11-21 RX ADMIN — HEPARIN SODIUM 5000 UNITS: 5000 INJECTION INTRAVENOUS; SUBCUTANEOUS at 17:13

## 2023-11-21 RX ADMIN — PANTOPRAZOLE SODIUM 40 MG: 40 INJECTION, POWDER, FOR SOLUTION INTRAVENOUS at 09:00

## 2023-11-21 RX ADMIN — ASPIRIN 81 MG CHEWABLE TABLET 81 MG: 81 TABLET CHEWABLE at 08:00

## 2023-11-21 RX ADMIN — MAGNESIUM SULFATE HEPTAHYDRATE 2 G: 40 INJECTION, SOLUTION INTRAVENOUS at 08:49

## 2023-11-21 RX ADMIN — ACETAMINOPHEN 650 MG: 325 TABLET ORAL at 09:37

## 2023-11-21 RX ADMIN — ACETAMINOPHEN 650 MG: 325 TABLET ORAL at 16:01

## 2023-11-21 RX ADMIN — OXYCODONE HYDROCHLORIDE 5 MG: 5 TABLET ORAL at 16:01

## 2023-11-21 RX ADMIN — CEFAZOLIN SODIUM 2 G: 2 INJECTION, SOLUTION INTRAVENOUS at 00:03

## 2023-11-21 RX ADMIN — OXYCODONE HYDROCHLORIDE 10 MG: 5 TABLET ORAL at 07:42

## 2023-11-21 RX ADMIN — HYDROMORPHONE HYDROCHLORIDE 0.5 MG: 1 INJECTION, SOLUTION INTRAMUSCULAR; INTRAVENOUS; SUBCUTANEOUS at 20:59

## 2023-11-21 RX ADMIN — OXYCODONE HYDROCHLORIDE 10 MG: 5 TABLET ORAL at 02:50

## 2023-11-21 RX ADMIN — ESCITALOPRAM OXALATE 5 MG: 10 TABLET ORAL at 10:01

## 2023-11-21 RX ADMIN — HEPARIN SODIUM 5000 UNITS: 5000 INJECTION INTRAVENOUS; SUBCUTANEOUS at 08:46

## 2023-11-21 RX ADMIN — OXYCODONE HYDROCHLORIDE 5 MG: 5 TABLET ORAL at 21:59

## 2023-11-21 RX ADMIN — CEFAZOLIN SODIUM 2 G: 2 INJECTION, SOLUTION INTRAVENOUS at 07:42

## 2023-11-21 RX ADMIN — ACETAMINOPHEN 650 MG: 325 TABLET ORAL at 22:00

## 2023-11-21 SDOH — ECONOMIC STABILITY: INCOME INSECURITY: IN THE LAST 12 MONTHS, WAS THERE A TIME WHEN YOU WERE NOT ABLE TO PAY THE MORTGAGE OR RENT ON TIME?: NO

## 2023-11-21 SDOH — HEALTH STABILITY: MENTAL HEALTH
STRESS IS WHEN SOMEONE FEELS TENSE, NERVOUS, ANXIOUS, OR CAN'T SLEEP AT NIGHT BECAUSE THEIR MIND IS TROUBLED. HOW STRESSED ARE YOU?: NOT AT ALL

## 2023-11-21 SDOH — HEALTH STABILITY: MENTAL HEALTH: HOW OFTEN DO YOU HAVE A DRINK CONTAINING ALCOHOL?: NEVER

## 2023-11-21 SDOH — ECONOMIC STABILITY: HOUSING INSECURITY
IN THE LAST 12 MONTHS, WAS THERE A TIME WHEN YOU DID NOT HAVE A STEADY PLACE TO SLEEP OR SLEPT IN A SHELTER (INCLUDING NOW)?: NO

## 2023-11-21 SDOH — SOCIAL STABILITY: SOCIAL INSECURITY
WITHIN THE LAST YEAR, HAVE YOU BEEN KICKED, HIT, SLAPPED, OR OTHERWISE PHYSICALLY HURT BY YOUR PARTNER OR EX-PARTNER?: NO

## 2023-11-21 SDOH — ECONOMIC STABILITY: TRANSPORTATION INSECURITY
IN THE PAST 12 MONTHS, HAS THE LACK OF TRANSPORTATION KEPT YOU FROM MEDICAL APPOINTMENTS OR FROM GETTING MEDICATIONS?: NO

## 2023-11-21 SDOH — HEALTH STABILITY: MENTAL HEALTH: HOW OFTEN DO YOU HAVE 6 OR MORE DRINKS ON ONE OCCASION?: NEVER

## 2023-11-21 SDOH — SOCIAL STABILITY: SOCIAL INSECURITY: WITHIN THE LAST YEAR, HAVE YOU BEEN AFRAID OF YOUR PARTNER OR EX-PARTNER?: NO

## 2023-11-21 SDOH — SOCIAL STABILITY: SOCIAL NETWORK: IN A TYPICAL WEEK, HOW MANY TIMES DO YOU TALK ON THE PHONE WITH FAMILY, FRIENDS, OR NEIGHBORS?: THREE TIMES A WEEK

## 2023-11-21 SDOH — SOCIAL STABILITY: SOCIAL NETWORK: ARE YOU MARRIED, WIDOWED, DIVORCED, SEPARATED, NEVER MARRIED, OR LIVING WITH A PARTNER?: MARRIED

## 2023-11-21 SDOH — ECONOMIC STABILITY: HOUSING INSECURITY: IN THE LAST 12 MONTHS, HOW MANY PLACES HAVE YOU LIVED?: 1

## 2023-11-21 SDOH — SOCIAL STABILITY: SOCIAL INSECURITY: WITHIN THE LAST YEAR, HAVE YOU BEEN HUMILIATED OR EMOTIONALLY ABUSED IN OTHER WAYS BY YOUR PARTNER OR EX-PARTNER?: NO

## 2023-11-21 SDOH — ECONOMIC STABILITY: INCOME INSECURITY: IN THE PAST 12 MONTHS, HAS THE ELECTRIC, GAS, OIL, OR WATER COMPANY THREATENED TO SHUT OFF SERVICE IN YOUR HOME?: NO

## 2023-11-21 SDOH — SOCIAL STABILITY: SOCIAL NETWORK: HOW OFTEN DO YOU ATTEND CHURCH OR RELIGIOUS SERVICES?: NEVER

## 2023-11-21 SDOH — ECONOMIC STABILITY: FOOD INSECURITY: WITHIN THE PAST 12 MONTHS, YOU WORRIED THAT YOUR FOOD WOULD RUN OUT BEFORE YOU GOT MONEY TO BUY MORE.: NEVER TRUE

## 2023-11-21 SDOH — SOCIAL STABILITY: SOCIAL INSECURITY
WITHIN THE LAST YEAR, HAVE TO BEEN RAPED OR FORCED TO HAVE ANY KIND OF SEXUAL ACTIVITY BY YOUR PARTNER OR EX-PARTNER?: NO

## 2023-11-21 SDOH — ECONOMIC STABILITY: FOOD INSECURITY: WITHIN THE PAST 12 MONTHS, THE FOOD YOU BOUGHT JUST DIDN'T LAST AND YOU DIDN'T HAVE MONEY TO GET MORE.: NEVER TRUE

## 2023-11-21 SDOH — SOCIAL STABILITY: SOCIAL NETWORK
DO YOU BELONG TO ANY CLUBS OR ORGANIZATIONS SUCH AS CHURCH GROUPS UNIONS, FRATERNAL OR ATHLETIC GROUPS, OR SCHOOL GROUPS?: NO

## 2023-11-21 SDOH — ECONOMIC STABILITY: TRANSPORTATION INSECURITY
IN THE PAST 12 MONTHS, HAS LACK OF TRANSPORTATION KEPT YOU FROM MEETINGS, WORK, OR FROM GETTING THINGS NEEDED FOR DAILY LIVING?: NO

## 2023-11-21 SDOH — SOCIAL STABILITY: SOCIAL NETWORK: HOW OFTEN DO YOU ATTENT MEETINGS OF THE CLUB OR ORGANIZATION YOU BELONG TO?: NEVER

## 2023-11-21 SDOH — HEALTH STABILITY: PHYSICAL HEALTH: ON AVERAGE, HOW MANY DAYS PER WEEK DO YOU ENGAGE IN MODERATE TO STRENUOUS EXERCISE (LIKE A BRISK WALK)?: 3 DAYS

## 2023-11-21 SDOH — ECONOMIC STABILITY: INCOME INSECURITY: HOW HARD IS IT FOR YOU TO PAY FOR THE VERY BASICS LIKE FOOD, HOUSING, MEDICAL CARE, AND HEATING?: NOT HARD AT ALL

## 2023-11-21 SDOH — HEALTH STABILITY: MENTAL HEALTH: HOW MANY STANDARD DRINKS CONTAINING ALCOHOL DO YOU HAVE ON A TYPICAL DAY?: PATIENT DOES NOT DRINK

## 2023-11-21 SDOH — SOCIAL STABILITY: SOCIAL NETWORK: HOW OFTEN DO YOU GET TOGETHER WITH FRIENDS OR RELATIVES?: THREE TIMES A WEEK

## 2023-11-21 SDOH — HEALTH STABILITY: PHYSICAL HEALTH: ON AVERAGE, HOW MANY MINUTES DO YOU ENGAGE IN EXERCISE AT THIS LEVEL?: 60 MIN

## 2023-11-21 ASSESSMENT — COGNITIVE AND FUNCTIONAL STATUS - GENERAL
WALKING IN HOSPITAL ROOM: A LITTLE
DRESSING REGULAR LOWER BODY CLOTHING: A LITTLE
TOILETING: A LITTLE
MOVING TO AND FROM BED TO CHAIR: A LITTLE
HELP NEEDED FOR BATHING: A LITTLE
DRESSING REGULAR UPPER BODY CLOTHING: A LITTLE
DRESSING REGULAR UPPER BODY CLOTHING: A LITTLE
MOBILITY SCORE: 21
DAILY ACTIVITIY SCORE: 19
CLIMB 3 TO 5 STEPS WITH RAILING: A LITTLE
DRESSING REGULAR LOWER BODY CLOTHING: A LITTLE
PERSONAL GROOMING: A LITTLE
HELP NEEDED FOR BATHING: A LITTLE
TOILETING: A LITTLE
PERSONAL GROOMING: A LITTLE
DAILY ACTIVITIY SCORE: 19
CLIMB 3 TO 5 STEPS WITH RAILING: A LITTLE
MOBILITY SCORE: 21
MOVING TO AND FROM BED TO CHAIR: A LITTLE
WALKING IN HOSPITAL ROOM: A LITTLE

## 2023-11-21 ASSESSMENT — PAIN SCALES - GENERAL
PAINLEVEL_OUTOF10: 8
PAINLEVEL_OUTOF10: 2
PAINLEVEL_OUTOF10: 9
PAINLEVEL_OUTOF10: 5 - MODERATE PAIN
PAINLEVEL_OUTOF10: 4
PAINLEVEL_OUTOF10: 5 - MODERATE PAIN
PAINLEVEL_OUTOF10: 6
PAINLEVEL_OUTOF10: 0 - NO PAIN
PAINLEVEL_OUTOF10: 2
PAINLEVEL_OUTOF10: 4
PAINLEVEL_OUTOF10: 0 - NO PAIN

## 2023-11-21 ASSESSMENT — PAIN DESCRIPTION - DESCRIPTORS
DESCRIPTORS: ACHING;SORE
DESCRIPTORS: ACHING

## 2023-11-21 ASSESSMENT — LIFESTYLE VARIABLES
AUDIT-C TOTAL SCORE: 0
SKIP TO QUESTIONS 9-10: 1

## 2023-11-21 ASSESSMENT — PAIN - FUNCTIONAL ASSESSMENT
PAIN_FUNCTIONAL_ASSESSMENT: 0-10

## 2023-11-21 ASSESSMENT — PAIN DESCRIPTION - LOCATION
LOCATION: HAND
LOCATION: NECK
LOCATION: NECK

## 2023-11-21 ASSESSMENT — PAIN DESCRIPTION - ORIENTATION
ORIENTATION: LEFT
ORIENTATION: LEFT

## 2023-11-21 NOTE — PROGRESS NOTES
"David Block is a 73 y.o. male on day 1 of admission presenting with Symptomatic stenosis of left carotid artery.    Subjective   No acute events overnight. Paient reports some left neck swelling and hoarseness. Patient has no complaints of breathing or headache. Tolerated liquids without issues. Denies f/c/n/v.        Objective   Physical Exam:  Constitutional: no acute distress  HEENT: no scleral icterus; L neck dressing CDI, removed at bedside, small neck hematoma, phonating appropriately   Cardiac: regular rate, normotensive   Pulmonary: saturating appropriately on 4L NC   Neuro: conversant, no focal deficits  Last Recorded Vitals  Blood pressure (!) 139/48, pulse 86, temperature 36.8 °C (98.2 °F), temperature source Temporal, resp. rate 19, height 1.702 m (5' 7\"), weight 86.6 kg (190 lb 14.7 oz), SpO2 95 %.  Intake/Output last 3 Shifts:  I/O last 3 completed shifts:  In: 2645.6 (30.5 mL/kg) [P.O.:480; I.V.:5.6 (0.1 mL/kg); Blood:250; IV Piggyback:1910]  Out: 3450 (39.8 mL/kg) [Urine:3425 (1.1 mL/kg/hr); Blood:25]  Weight: 86.6 kg     Relevant Results  Results for orders placed or performed during the hospital encounter of 11/20/23 (from the past 24 hour(s))   Type And Screen   Result Value Ref Range    ABO TYPE A     Rh TYPE POS     ANTIBODY SCREEN NEG    Blood Gas Arterial Full Panel   Result Value Ref Range    POCT pH, Arterial 7.32 (L) 7.38 - 7.42 pH    POCT pCO2, Arterial 48 (H) 38 - 42 mm Hg    POCT pO2, Arterial 198 (H) 85 - 95 mm Hg    POCT SO2, Arterial 100 94 - 100 %    POCT Oxy Hemoglobin, Arterial 96.1 94.0 - 98.0 %    POCT Hematocrit Calculated, Arterial 50.0 41.0 - 52.0 %    POCT Sodium, Arterial 137 136 - 145 mmol/L    POCT Potassium, Arterial 4.0 3.5 - 5.3 mmol/L    POCT Chloride, Arterial 107 98 - 107 mmol/L    POCT Ionized Calcium, Arterial 1.22 1.10 - 1.33 mmol/L    POCT Glucose, Arterial 105 (H) 74 - 99 mg/dL    POCT Lactate, Arterial 1.1 0.4 - 2.0 mmol/L    POCT Base Excess, Arterial -2.0 " -2.0 - 3.0 mmol/L    POCT HCO3 Calculated, Arterial 24.7 22.0 - 26.0 mmol/L    POCT Hemoglobin, Arterial 16.8 13.5 - 17.5 g/dL    POCT Anion Gap, Arterial 9 (L) 10 - 25 mmo/L    Patient Temperature 37.0 degrees Celsius    FiO2 70 %   Blood Gas Arterial Full Panel   Result Value Ref Range    POCT pH, Arterial 7.24 (LL) 7.38 - 7.42 pH    POCT pCO2, Arterial 57 (H) 38 - 42 mm Hg    POCT pO2, Arterial 116 (H) 85 - 95 mm Hg    POCT SO2, Arterial 99 94 - 100 %    POCT Oxy Hemoglobin, Arterial 95.2 94.0 - 98.0 %    POCT Hematocrit Calculated, Arterial 53.0 (H) 41.0 - 52.0 %    POCT Sodium, Arterial 136 136 - 145 mmol/L    POCT Potassium, Arterial 4.2 3.5 - 5.3 mmol/L    POCT Chloride, Arterial 106 98 - 107 mmol/L    POCT Ionized Calcium, Arterial 1.20 1.10 - 1.33 mmol/L    POCT Glucose, Arterial 106 (H) 74 - 99 mg/dL    POCT Lactate, Arterial 1.0 0.4 - 2.0 mmol/L    POCT Base Excess, Arterial -4.3 (L) -2.0 - 3.0 mmol/L    POCT HCO3 Calculated, Arterial 24.4 22.0 - 26.0 mmol/L    POCT Hemoglobin, Arterial 17.5 13.5 - 17.5 g/dL    POCT Anion Gap, Arterial 10 10 - 25 mmo/L    Patient Temperature 37.0 degrees Celsius    FiO2 100 %   Blood Gas Arterial Full Panel   Result Value Ref Range    POCT pH, Arterial 7.31 (L) 7.38 - 7.42 pH    POCT pCO2, Arterial 43 (H) 38 - 42 mm Hg    POCT pO2, Arterial 287 (H) 85 - 95 mm Hg    POCT SO2, Arterial 100 94 - 100 %    POCT Oxy Hemoglobin, Arterial 97.1 94.0 - 98.0 %    POCT Hematocrit Calculated, Arterial 49.0 41.0 - 52.0 %    POCT Sodium, Arterial 136 136 - 145 mmol/L    POCT Potassium, Arterial 4.0 3.5 - 5.3 mmol/L    POCT Chloride, Arterial 107 98 - 107 mmol/L    POCT Ionized Calcium, Arterial 1.14 1.10 - 1.33 mmol/L    POCT Glucose, Arterial 102 (H) 74 - 99 mg/dL    POCT Lactate, Arterial 0.8 0.4 - 2.0 mmol/L    POCT Base Excess, Arterial -4.5 (L) -2.0 - 3.0 mmol/L    POCT HCO3 Calculated, Arterial 21.7 (L) 22.0 - 26.0 mmol/L    POCT Hemoglobin, Arterial 16.2 13.5 - 17.5 g/dL    POCT  Anion Gap, Arterial 11 10 - 25 mmo/L    Patient Temperature 37.0 degrees Celsius    FiO2 100 %   Prepare RBC: 2 Units   Result Value Ref Range    PRODUCT CODE [id number]     Unit Number [id number]     Unit ABO A     Unit RH POS     XM INTEP COMP     Dispense Status XM     Blood Expiration Date December 08, 2023 23:59 EST     PRODUCT BLOOD TYPE 6200     UNIT VOLUME 350     PRODUCT CODE [id number]     Unit Number [id number]     Unit ABO A     Unit RH POS     XM INTEP COMP     Dispense Status XM     Blood Expiration Date December 10, 2023 23:59 EST     PRODUCT BLOOD TYPE 6200     UNIT VOLUME 350    Blood Gas Arterial Full Panel   Result Value Ref Range    POCT pH, Arterial 7.34 (L) 7.38 - 7.42 pH    POCT pCO2, Arterial 44 (H) 38 - 42 mm Hg    POCT pO2, Arterial 80 (L) 85 - 95 mm Hg    POCT SO2, Arterial 96 94 - 100 %    POCT Oxy Hemoglobin, Arterial 92.8 (L) 94.0 - 98.0 %    POCT Hematocrit Calculated, Arterial 47.0 41.0 - 52.0 %    POCT Sodium, Arterial 137 136 - 145 mmol/L    POCT Potassium, Arterial 4.0 3.5 - 5.3 mmol/L    POCT Chloride, Arterial 107 98 - 107 mmol/L    POCT Ionized Calcium, Arterial 1.17 1.10 - 1.33 mmol/L    POCT Glucose, Arterial 96 74 - 99 mg/dL    POCT Lactate, Arterial 1.5 0.4 - 2.0 mmol/L    POCT Base Excess, Arterial -2.3 (L) -2.0 - 3.0 mmol/L    POCT HCO3 Calculated, Arterial 23.7 22.0 - 26.0 mmol/L    POCT Hemoglobin, Arterial 15.6 13.5 - 17.5 g/dL    POCT Anion Gap, Arterial 10 10 - 25 mmo/L    Patient Temperature 37.0 degrees Celsius    FiO2 28 %   CBC   Result Value Ref Range    WBC 11.3 4.4 - 11.3 x10*3/uL    nRBC 0.0 0.0 - 0.0 /100 WBCs    RBC 5.00 4.50 - 5.90 x10*6/uL    Hemoglobin 15.9 13.5 - 17.5 g/dL    Hematocrit 47.6 41.0 - 52.0 %    MCV 95 80 - 100 fL    MCH 31.8 26.0 - 34.0 pg    MCHC 33.4 32.0 - 36.0 g/dL    RDW 13.5 11.5 - 14.5 %    Platelets 154 150 - 450 x10*3/uL   Renal Function Panel   Result Value Ref Range    Glucose 86 74 - 99 mg/dL    Sodium 143 136 - 145 mmol/L     Potassium 4.1 3.5 - 5.3 mmol/L    Chloride 109 (H) 98 - 107 mmol/L    Bicarbonate 24 21 - 32 mmol/L    Anion Gap 14 10 - 20 mmol/L    Urea Nitrogen 13 6 - 23 mg/dL    Creatinine 0.90 0.50 - 1.30 mg/dL    eGFR >90 >60 mL/min/1.73m*2    Calcium 8.9 8.6 - 10.6 mg/dL    Phosphorus 3.9 2.5 - 4.9 mg/dL    Albumin 4.0 3.4 - 5.0 g/dL   Magnesium   Result Value Ref Range    Magnesium 1.88 1.60 - 2.40 mg/dL   Calcium, Ionized   Result Value Ref Range    POCT Calcium, Ionized 1.12 1.1 - 1.33 mmol/L   Coagulation Screen   Result Value Ref Range    Protime 14.4 (H) 9.8 - 12.8 seconds    INR 1.3 (H) 0.9 - 1.1    aPTT 29 27 - 38 seconds   Blood Gas Arterial Full Panel   Result Value Ref Range    POCT pH, Arterial 7.37 (L) 7.38 - 7.42 pH    POCT pCO2, Arterial 37 (L) 38 - 42 mm Hg    POCT pO2, Arterial 92 85 - 95 mm Hg    POCT SO2, Arterial 98 94 - 100 %    POCT Oxy Hemoglobin, Arterial 95.2 94.0 - 98.0 %    POCT Hematocrit Calculated, Arterial 45.0 41.0 - 52.0 %    POCT Sodium, Arterial 136 136 - 145 mmol/L    POCT Potassium, Arterial 3.5 3.5 - 5.3 mmol/L    POCT Chloride, Arterial 107 98 - 107 mmol/L    POCT Ionized Calcium, Arterial 1.12 1.10 - 1.33 mmol/L    POCT Glucose, Arterial 122 (H) 74 - 99 mg/dL    POCT Lactate, Arterial 1.1 0.4 - 2.0 mmol/L    POCT Base Excess, Arterial -3.4 (L) -2.0 - 3.0 mmol/L    POCT HCO3 Calculated, Arterial 21.4 (L) 22.0 - 26.0 mmol/L    POCT Hemoglobin, Arterial 14.9 13.5 - 17.5 g/dL    POCT Anion Gap, Arterial 11 10 - 25 mmo/L    Patient Temperature 37.0 degrees Celsius    FiO2 4 %   Magnesium   Result Value Ref Range    Magnesium 1.92 1.60 - 2.40 mg/dL   Calcium, Ionized   Result Value Ref Range    POCT Calcium, Ionized 1.12 1.1 - 1.33 mmol/L   Coagulation Screen   Result Value Ref Range    Protime 14.6 (H) 9.8 - 12.8 seconds    INR 1.3 (H) 0.9 - 1.1    aPTT 28 27 - 38 seconds   Renal Function Panel   Result Value Ref Range    Glucose 87 74 - 99 mg/dL    Sodium 142 136 - 145 mmol/L     Potassium 3.9 3.5 - 5.3 mmol/L    Chloride 108 (H) 98 - 107 mmol/L    Bicarbonate 25 21 - 32 mmol/L    Anion Gap 13 10 - 20 mmol/L    Urea Nitrogen 10 6 - 23 mg/dL    Creatinine 0.76 0.50 - 1.30 mg/dL    eGFR >90 >60 mL/min/1.73m*2    Calcium 8.9 8.6 - 10.6 mg/dL    Phosphorus 4.2 2.5 - 4.9 mg/dL    Albumin 4.1 3.4 - 5.0 g/dL   CBC   Result Value Ref Range    WBC 10.0 4.4 - 11.3 x10*3/uL    nRBC 0.0 0.0 - 0.0 /100 WBCs    RBC 4.58 4.50 - 5.90 x10*6/uL    Hemoglobin 14.2 13.5 - 17.5 g/dL    Hematocrit 43.2 41.0 - 52.0 %    MCV 94 80 - 100 fL    MCH 31.0 26.0 - 34.0 pg    MCHC 32.9 32.0 - 36.0 g/dL    RDW 13.5 11.5 - 14.5 %    Platelets 151 150 - 450 x10*3/uL   POCT GLUCOSE   Result Value Ref Range    POCT Glucose 122 (H) 74 - 99 mg/dL         Assessment/Plan   Principal Problem:    Symptomatic stenosis of left carotid artery    David Block is a 73 y.o. male presenting with a history idiopathic pulmonary fibrosis and is being evaluated for a lung transplant. He was found to have severe left carotid artery stenosis on recent carotid duplex ultrasound with a PSV/EDV of 649/311 and a ratio of 9.7. Presents s/p left carotid endarterectomy by Dr. Morgan on 11/20/2023. EBL 50 ml. Reported to have significant oozing. Extubated in OR. Arrives to SICU on 6L NC. Alert and oriented with no focal deficit. Small hematoma visible at surgical site. No breathing changes or tightness over skin appreciable.    Plan:  - HOB elevated  - pain control  - continue aspirin, hold plavix  - ok to resume home statin  - SQH for DVT prophylaxis  - regular diet  - remove lopes   - ok to transfer to [facility]    Case was d/w attending Dr. Morgan.       Olive Sadler MD  General Surgery PGY1  [id number]

## 2023-11-21 NOTE — PROGRESS NOTES
Speech-Language Pathology  Inpatient Speech-Language Pathology Clinical Swallow Evaluation    Patient Name: David Block  MRN: [id number]  Today's Date: 11/21/2023   Time Calculation  Start Time: 1653  Stop Time: 1710  Time Calculation (min): 17 min       Assessment:  Odynophagia and suspected pharyngeal dysphagia s/p L CEA as evidenced by s/s of aspiration following 3 oz Swallow Protocol        Recommendations:  NPO    Frequent, aggressive oral care as tolerated to improve infection control, as well as to reduce dental plaque and bacteria on oropharyngeal surfaces which may increase the risk nosocomial infections, including pneumonia.     OK for small amounts of ice chips (one at a time, 10x/hour) for oral comfort and to prevent swallow disuse atrophy, but only after aggressive oral care to avoid colonization of bacteria within the oral cavity.     Fiberoptic Endoscopic Evaluation of Swallowing for further assessment of dysphagia.  Goal to complete tomorrow AM.        Plan:  SLP Plan: Skilled SLP  SLP Frequency: 2x per week  Duration:  2 weeks  Discussed POC: Pt and RN      Goals:  Pt will consistently form labial/lingual seal and initiate swallow response with presentations of moist toothettes x10 reps.    Pt will demonstrate adequate oral phase and initiate effortful swallows with small ice chips x10 reps.  Pt will tolerate least restrictive diet with adequate oral phase and no s/s of aspiration.               Subjective   RN cleared pt for evaluation.  Pt properly identified and evaluated bedside.  Awake and alert.  No c/o pain.  O2 via NC.  No family present.  Frequent throat clearing at rest.     Pt s/p L CEA 11/20.  Reports dysphonia following; worsened as day progressed yesterday- stable today.  Also reports odynophagia following sx.  Pt also w/ pulmonary fibrosis (being evaluated for a lung transplant).     Denies baseline dysphagia.  Tolerates a regular solid/thin liquid diet at home.         Objective      Cognition:  Command Following: WFL  Attention: WFL  Orientation: Oriented x4.      Oral/Motor Assessment:  Oral Hygiene: WFL  Dentition: WFL  Oral Motor: WFL  Voice (Perceptually): Mild dysphonia  Volitional Cough (Perceptually): Mild dystussia   *L neck swelling/bruising           Consistencies Trialed:  Ice chips, water via straw.  Not appropriate for additional trials given apparent severity of swallowing deficits.         Clinical Observations:  Patient Positioning: Upright in Bed  Management of Oral Secretions: WFL  Was The 3 oz Swallow Protocol Completed: Yes         Clinical bedside swallow evaluation completed.  Pt presented with small, single ice chips x4.  Adequate bolus containment and manipulation.  No s/s of aspiration.  Pt then consumed ~2 oz water via single sips.  Functional oral phase and no s/s of aspiration.  Pt completed the 3 oz Swallow Protocol (consumption of 3 oz of water via successive boluses) with delayed cough/throat clear following.  Pt with c/o mild odynophagia with PO trials.  Improved since getting dose of tylenol.  Given s/s of aspiration, concern for pharyngeal dysphagia.  Will require further assessment via FEES.       Education:  Education provided to patient and RN regarding NPO recommendations, allowance of ice chips, importance of oral care, and overall dysphagia plan of care.  Understanding indicated.  Discussed pt status w/ RN.            11/21/23 at 5:31 PM - Pricila Ojeda, SLP

## 2023-11-21 NOTE — PROGRESS NOTES
History Of Present Illness  David Block is a 73 y.o. male presenting with a history idiopathic pulmonary fibrosis and is being evaluated for a lung transplant. He was found to have severe left carotid artery stenosis on recent carotid duplex ultrasound with a PSV/EDV of 649/311 and a ratio of 9.7. Presents s/p left carotid endarterectomy by Dr. Morgan on 11/20/2023. EBL 50 ml. Reported to have significant oozing. Extubated in OR. Arrives to SICU on 6L NC. Alert and oriented with no focal deficit. Neck at surgical site is soft.     11/21: No significant events overnight. Vascular OK for transfer to floor.      PMH: HTN, HLD, IPF (PRN oxygen), carotid artery stenosis        --------------------------------------------------------------------------------------------------     Assessment and Plan  - Status post left carotid endarterectomy on 11/20/2023     Neuro:  -Frequent neuro checks to assess for cranial nerve injury.   -Anelgesia - Tylenol for pain control, dilaudid IV if needed  -HOB > 30-degrees to promote venous drainage and reduces intracranial pressure and until pharyngeal reflex appropriate.  -Resume home escitalopram     CV: History of HTN, HLD, and carotid artery stenosis. Recent coronary catheterization performed 9/2023 demonstrates 2-vessel disease. Hx of severe left carotid artery stenosis s/p carotid endarectomy by Dr Morgan on 11/20/2023.  -Monitor hemodynamics with arterial line, Goals: MAP > 65, -150, DBP < 100  -labetalol PRN, not needed past 12 hrs.   -Continue home ASA 81mg today per vascular  -no plavix today  -atorvastatin 80mg  -Hold home bisoprolol, HTCZ, losartan...reintroduce if needed     Pulmonary: History idiopathic pulmonary fibrosis and is being evaluated for a lung transplant. Per chart review patient on 4L NC with activity at home, 2L with rest PRN, no O2 at night. Extubated in OR. On 2L NC this AM.   -Aggressive pulmonary toileting, encourage incentive spirometer  -hold home  "Pirfenidone for now (treatment of idiopathic pulmonary fibrosis)     GI: Abdomen soft and nontender.  -Diet: Vascular OK for regular diet.   -Start bowel regimen when advancing diet tomorrow  -GI prophylaxis no longer indicated, DC     : Baseline creatinine 0.9. 0.76 this AM.   -DC lopes catheter, monitor I/O's  -Monitor renal function, daily RMP  -Monitor and optimize electrolytes, keep potassium >4.0, magnesium > 2.0 (1.92 this AM and 2g IV ordered)  -MIV fluids - none     Heme: Hgb 14.2. No active bleeding suspected.   -daily CBC  -Vascular OK for subcutaneous heparin so will start today   -SCDs     Endo: No history of diabetes or other endocrine disorder.  -Monitor blood glucose     ID: Afebrile.  -Antibiotics: Postop cefazolin × 24hrs completing today  -Will monitor for fever, WBCs, and incision site for signs of infection.    Dispo: Vascular OK for transfer to [place] [id number]. Discussed with Dr. Canales.        Objective     Physical Exam  Constitutional:       Appearance: Normal appearance.   HENT:      Head: Normocephalic and atraumatic.      Mouth/Throat:      Mouth: Mucous membranes are dry.   Eyes:      Pupils: Pupils are equal, round, and reactive to light.   Cardiovascular:      Rate and Rhythm: Normal rate.   Pulmonary:      Effort: Pulmonary effort is normal.      Breath sounds: Normal breath sounds.   Abdominal:      Palpations: Abdomen is soft.   Skin:     General: Skin is warm and dry.      Capillary Refill: Capillary refill takes less than 2 seconds.   Neurological:      Mental Status: He is alert.   - Left CEA sit open to air, clean, no redness    Last Recorded Vitals  Blood pressure (!) 139/48, pulse 86, temperature 36.7 °C (98.1 °F), temperature source Temporal, resp. rate 19, height 1.702 m (5' 7\"), weight 86.6 kg (190 lb 14.7 oz), SpO2 95 %.  Intake/Output last 3 Shifts:  I/O last 3 completed shifts:  In: 2645.6 (30.5 mL/kg) [P.O.:480; I.V.:5.6 (0.1 mL/kg); Blood:250; IV Piggyback:1910]  Out: 3450 " (39.8 mL/kg) [Urine:3425 (1.1 mL/kg/hr); Blood:25]  Weight: 86.6 kg     Relevant Results  Scheduled medications  acetaminophen, 650 mg, oral, q6h  aspirin, 81 mg, oral, Daily  [Held by provider] bisoprolol, 2.5 mg, oral, Daily  [Held by provider] cholecalciferol, 5,000 Units, oral, Daily  escitalopram, 5 mg, oral, Daily  heparin (porcine), 5,000 Units, subcutaneous, q8h  [Held by provider] hydroCHLOROthiazide, 25 mg, oral, q AM  insulin lispro, 0-5 Units, subcutaneous, TID with meals  [Held by provider] losartan, 25 mg, oral, Daily  magnesium sulfate, 2 g, intravenous, Once      Continuous medications  lactated Ringer's, 5 mL/hr, Last Rate: 5 mL/hr (11/20/23 1900)      PRN medications  PRN medications: dextrose 10 % in water (D10W), dextrose, HYDROmorphone, labetaloL, oxyCODONE, oxyCODONE, oxygen    Results for orders placed or performed during the hospital encounter of 11/20/23 (from the past 24 hour(s))   Type And Screen   Result Value Ref Range    ABO TYPE A     Rh TYPE POS     ANTIBODY SCREEN NEG    Blood Gas Arterial Full Panel   Result Value Ref Range    POCT pH, Arterial 7.32 (L) 7.38 - 7.42 pH    POCT pCO2, Arterial 48 (H) 38 - 42 mm Hg    POCT pO2, Arterial 198 (H) 85 - 95 mm Hg    POCT SO2, Arterial 100 94 - 100 %    POCT Oxy Hemoglobin, Arterial 96.1 94.0 - 98.0 %    POCT Hematocrit Calculated, Arterial 50.0 41.0 - 52.0 %    POCT Sodium, Arterial 137 136 - 145 mmol/L    POCT Potassium, Arterial 4.0 3.5 - 5.3 mmol/L    POCT Chloride, Arterial 107 98 - 107 mmol/L    POCT Ionized Calcium, Arterial 1.22 1.10 - 1.33 mmol/L    POCT Glucose, Arterial 105 (H) 74 - 99 mg/dL    POCT Lactate, Arterial 1.1 0.4 - 2.0 mmol/L    POCT Base Excess, Arterial -2.0 -2.0 - 3.0 mmol/L    POCT HCO3 Calculated, Arterial 24.7 22.0 - 26.0 mmol/L    POCT Hemoglobin, Arterial 16.8 13.5 - 17.5 g/dL    POCT Anion Gap, Arterial 9 (L) 10 - 25 mmo/L    Patient Temperature 37.0 degrees Celsius    FiO2 70 %   Blood Gas Arterial Full Panel    Result Value Ref Range    POCT pH, Arterial 7.24 (LL) 7.38 - 7.42 pH    POCT pCO2, Arterial 57 (H) 38 - 42 mm Hg    POCT pO2, Arterial 116 (H) 85 - 95 mm Hg    POCT SO2, Arterial 99 94 - 100 %    POCT Oxy Hemoglobin, Arterial 95.2 94.0 - 98.0 %    POCT Hematocrit Calculated, Arterial 53.0 (H) 41.0 - 52.0 %    POCT Sodium, Arterial 136 136 - 145 mmol/L    POCT Potassium, Arterial 4.2 3.5 - 5.3 mmol/L    POCT Chloride, Arterial 106 98 - 107 mmol/L    POCT Ionized Calcium, Arterial 1.20 1.10 - 1.33 mmol/L    POCT Glucose, Arterial 106 (H) 74 - 99 mg/dL    POCT Lactate, Arterial 1.0 0.4 - 2.0 mmol/L    POCT Base Excess, Arterial -4.3 (L) -2.0 - 3.0 mmol/L    POCT HCO3 Calculated, Arterial 24.4 22.0 - 26.0 mmol/L    POCT Hemoglobin, Arterial 17.5 13.5 - 17.5 g/dL    POCT Anion Gap, Arterial 10 10 - 25 mmo/L    Patient Temperature 37.0 degrees Celsius    FiO2 100 %   Blood Gas Arterial Full Panel   Result Value Ref Range    POCT pH, Arterial 7.31 (L) 7.38 - 7.42 pH    POCT pCO2, Arterial 43 (H) 38 - 42 mm Hg    POCT pO2, Arterial 287 (H) 85 - 95 mm Hg    POCT SO2, Arterial 100 94 - 100 %    POCT Oxy Hemoglobin, Arterial 97.1 94.0 - 98.0 %    POCT Hematocrit Calculated, Arterial 49.0 41.0 - 52.0 %    POCT Sodium, Arterial 136 136 - 145 mmol/L    POCT Potassium, Arterial 4.0 3.5 - 5.3 mmol/L    POCT Chloride, Arterial 107 98 - 107 mmol/L    POCT Ionized Calcium, Arterial 1.14 1.10 - 1.33 mmol/L    POCT Glucose, Arterial 102 (H) 74 - 99 mg/dL    POCT Lactate, Arterial 0.8 0.4 - 2.0 mmol/L    POCT Base Excess, Arterial -4.5 (L) -2.0 - 3.0 mmol/L    POCT HCO3 Calculated, Arterial 21.7 (L) 22.0 - 26.0 mmol/L    POCT Hemoglobin, Arterial 16.2 13.5 - 17.5 g/dL    POCT Anion Gap, Arterial 11 10 - 25 mmo/L    Patient Temperature 37.0 degrees Celsius    FiO2 100 %   Prepare RBC: 2 Units   Result Value Ref Range    PRODUCT CODE Z5587O62     Unit Number [id number]     Unit ABO A     Unit RH POS     XM INTEP COMP     Dispense  Status XM     Blood Expiration Date December 08, 2023 23:59 EST     PRODUCT BLOOD TYPE 6200     UNIT VOLUME 350     PRODUCT CODE N6517L92     Unit Number [id number]     Unit ABO A     Unit RH POS     XM INTEP COMP     Dispense Status XM     Blood Expiration Date December 10, 2023 23:59 EST     PRODUCT BLOOD TYPE 6200     UNIT VOLUME 350    Blood Gas Arterial Full Panel   Result Value Ref Range    POCT pH, Arterial 7.34 (L) 7.38 - 7.42 pH    POCT pCO2, Arterial 44 (H) 38 - 42 mm Hg    POCT pO2, Arterial 80 (L) 85 - 95 mm Hg    POCT SO2, Arterial 96 94 - 100 %    POCT Oxy Hemoglobin, Arterial 92.8 (L) 94.0 - 98.0 %    POCT Hematocrit Calculated, Arterial 47.0 41.0 - 52.0 %    POCT Sodium, Arterial 137 136 - 145 mmol/L    POCT Potassium, Arterial 4.0 3.5 - 5.3 mmol/L    POCT Chloride, Arterial 107 98 - 107 mmol/L    POCT Ionized Calcium, Arterial 1.17 1.10 - 1.33 mmol/L    POCT Glucose, Arterial 96 74 - 99 mg/dL    POCT Lactate, Arterial 1.5 0.4 - 2.0 mmol/L    POCT Base Excess, Arterial -2.3 (L) -2.0 - 3.0 mmol/L    POCT HCO3 Calculated, Arterial 23.7 22.0 - 26.0 mmol/L    POCT Hemoglobin, Arterial 15.6 13.5 - 17.5 g/dL    POCT Anion Gap, Arterial 10 10 - 25 mmo/L    Patient Temperature 37.0 degrees Celsius    FiO2 28 %   CBC   Result Value Ref Range    WBC 11.3 4.4 - 11.3 x10*3/uL    nRBC 0.0 0.0 - 0.0 /100 WBCs    RBC 5.00 4.50 - 5.90 x10*6/uL    Hemoglobin 15.9 13.5 - 17.5 g/dL    Hematocrit 47.6 41.0 - 52.0 %    MCV 95 80 - 100 fL    MCH 31.8 26.0 - 34.0 pg    MCHC 33.4 32.0 - 36.0 g/dL    RDW 13.5 11.5 - 14.5 %    Platelets 154 150 - 450 x10*3/uL   Renal Function Panel   Result Value Ref Range    Glucose 86 74 - 99 mg/dL    Sodium 143 136 - 145 mmol/L    Potassium 4.1 3.5 - 5.3 mmol/L    Chloride 109 (H) 98 - 107 mmol/L    Bicarbonate 24 21 - 32 mmol/L    Anion Gap 14 10 - 20 mmol/L    Urea Nitrogen 13 6 - 23 mg/dL    Creatinine 0.90 0.50 - 1.30 mg/dL    eGFR >90 >60 mL/min/1.73m*2    Calcium 8.9 8.6 - 10.6  mg/dL    Phosphorus 3.9 2.5 - 4.9 mg/dL    Albumin 4.0 3.4 - 5.0 g/dL   Magnesium   Result Value Ref Range    Magnesium 1.88 1.60 - 2.40 mg/dL   Calcium, Ionized   Result Value Ref Range    POCT Calcium, Ionized 1.12 1.1 - 1.33 mmol/L   Coagulation Screen   Result Value Ref Range    Protime 14.4 (H) 9.8 - 12.8 seconds    INR 1.3 (H) 0.9 - 1.1    aPTT 29 27 - 38 seconds   Blood Gas Arterial Full Panel   Result Value Ref Range    POCT pH, Arterial 7.37 (L) 7.38 - 7.42 pH    POCT pCO2, Arterial 37 (L) 38 - 42 mm Hg    POCT pO2, Arterial 92 85 - 95 mm Hg    POCT SO2, Arterial 98 94 - 100 %    POCT Oxy Hemoglobin, Arterial 95.2 94.0 - 98.0 %    POCT Hematocrit Calculated, Arterial 45.0 41.0 - 52.0 %    POCT Sodium, Arterial 136 136 - 145 mmol/L    POCT Potassium, Arterial 3.5 3.5 - 5.3 mmol/L    POCT Chloride, Arterial 107 98 - 107 mmol/L    POCT Ionized Calcium, Arterial 1.12 1.10 - 1.33 mmol/L    POCT Glucose, Arterial 122 (H) 74 - 99 mg/dL    POCT Lactate, Arterial 1.1 0.4 - 2.0 mmol/L    POCT Base Excess, Arterial -3.4 (L) -2.0 - 3.0 mmol/L    POCT HCO3 Calculated, Arterial 21.4 (L) 22.0 - 26.0 mmol/L    POCT Hemoglobin, Arterial 14.9 13.5 - 17.5 g/dL    POCT Anion Gap, Arterial 11 10 - 25 mmo/L    Patient Temperature 37.0 degrees Celsius    FiO2 4 %   Magnesium   Result Value Ref Range    Magnesium 1.92 1.60 - 2.40 mg/dL   Calcium, Ionized   Result Value Ref Range    POCT Calcium, Ionized 1.12 1.1 - 1.33 mmol/L   Coagulation Screen   Result Value Ref Range    Protime 14.6 (H) 9.8 - 12.8 seconds    INR 1.3 (H) 0.9 - 1.1    aPTT 28 27 - 38 seconds   Renal Function Panel   Result Value Ref Range    Glucose 87 74 - 99 mg/dL    Sodium 142 136 - 145 mmol/L    Potassium 3.9 3.5 - 5.3 mmol/L    Chloride 108 (H) 98 - 107 mmol/L    Bicarbonate 25 21 - 32 mmol/L    Anion Gap 13 10 - 20 mmol/L    Urea Nitrogen 10 6 - 23 mg/dL    Creatinine 0.76 0.50 - 1.30 mg/dL    eGFR >90 >60 mL/min/1.73m*2    Calcium 8.9 8.6 - 10.6 mg/dL     Phosphorus 4.2 2.5 - 4.9 mg/dL    Albumin 4.1 3.4 - 5.0 g/dL   CBC   Result Value Ref Range    WBC 10.0 4.4 - 11.3 x10*3/uL    nRBC 0.0 0.0 - 0.0 /100 WBCs    RBC 4.58 4.50 - 5.90 x10*6/uL    Hemoglobin 14.2 13.5 - 17.5 g/dL    Hematocrit 43.2 41.0 - 52.0 %    MCV 94 80 - 100 fL    MCH 31.0 26.0 - 34.0 pg    MCHC 32.9 32.0 - 36.0 g/dL    RDW 13.5 11.5 - 14.5 %    Platelets 151 150 - 450 x10*3/uL   POCT GLUCOSE   Result Value Ref Range    POCT Glucose 122 (H) 74 - 99 mg/dL       No results found.               Assessment/Plan   Principal Problem:    Symptomatic stenosis of left carotid artery       I spent 45 minutes in the professional and overall care of this patient.      Griffin Cook, APRN-CNP, DNP

## 2023-11-21 NOTE — SIGNIFICANT EVENT
Post-Op Check    S:    Admitted for L carotid artery stenosis POD 0 from L carotid endarterectomy. PMHx idiopathic pulmonary fibrosis (on prn O2, being evaluated for lung transplant), HTN, HLD.  -pain well controlled 3/10, had a headache prior, now resolved  -SBP well controlled (SBP <160)  -no flatus or bm at this time, uop appropriate  -no blurry vision from L eye at this time (was intermittent prior to surgery)  -Patient intermittently on 5-6L O2 at home    O:   Vital signs are stable, normotensive, afebrile, no new or worsening oxygen requirement, not tachycardic  Visit Vitals  BP (!) 139/48   Pulse 69   Temp 36.3 °C (97.3 °F)   Resp 18      Physical Exam:  Constitutional: no acute distress  Skin: warm and dry overall   Neuro: A/O x4, no gross deficits   HEENT: no scleral icterus; L neck dressing CDI, no strikethrough, minor post-op swelling, no obvious signs of hematoma on palpation. Palpable L carotid pulse at distal half of the dressing. Phonating appropriately  Cardiac: RRR  Pulmonary: Unlabored respirations, on 4L O2  Abdomen: Non distended, non tender  : lopes in place    A/P:  Overall, patient is doing well postoperatively with no acute concerns.  Will continue to optimize pain control as needed.  Will continue to monitor clinical exam, vitals, I&O's, and labs when available.  Will follow up on the patient in the a.m. or sooner as needed.  -SBP < 160  -Wean O2 as tolerated  -IS, SCDs  -neurovasc checks q1h  -Appreciate CTICU care    Armand Roger MD  PGY1  General Surgery  Vascular Surgery [id number]

## 2023-11-21 NOTE — PROGRESS NOTES
Completed Care Transitions Assessments for DC/SDOH with patient and then patient xfer to [id number]  [facility] (LSW, MSW)

## 2023-11-21 NOTE — HOSPITAL COURSE
73 year old male with past medical history significant for HTN, HLD, severe left carotid artery stenosis, idiopathic pulmonary fibrosis who is currently being worked up for lung transplant. He is admitted status post left carotid endarterectomy. He was transferred to the ICU post operatively, extubated and stable. He was noted to have a small but stable neck hematoma post operatively. He ended up having to be taken back to the OR for evacuation of a hematoma and had a drain placed. He otherwise progressed as expected post operatively, remaining hemodynamically stable and neuro intact. Drain was removed on 11/25 as it had less than 25cc of output over the previous 24 hours. He then stayed overnight after the drain removal to monitor for any signs of worsening swelling or hematoma formation. He did not develop a worsening hematoma and tolerated a regular diet the following day.    He was discharged home on Aspirin 81mg and will follow up in the outpatient vascular surgery clinic in 1 month with carotid duplex as scheduled.

## 2023-11-22 ENCOUNTER — ANESTHESIA (OUTPATIENT)
Dept: OPERATING ROOM | Facility: HOSPITAL | Age: 73
DRG: 038 | End: 2023-11-22
Payer: MEDICARE

## 2023-11-22 ENCOUNTER — APPOINTMENT (OUTPATIENT)
Dept: RADIOLOGY | Facility: HOSPITAL | Age: 73
DRG: 038 | End: 2023-11-22
Payer: MEDICARE

## 2023-11-22 ENCOUNTER — ANESTHESIA EVENT (OUTPATIENT)
Dept: OPERATING ROOM | Facility: HOSPITAL | Age: 73
DRG: 038 | End: 2023-11-22
Payer: MEDICARE

## 2023-11-22 LAB
ABO GROUP (TYPE) IN BLOOD: NORMAL
ACT BLD: 158 SEC (ref 89–169)
ACT BLD: 165 SEC (ref 89–169)
ACT BLD: 182 SEC (ref 89–169)
ACT BLD: 279 SEC (ref 89–169)
ACT BLD: 368 SEC (ref 89–169)
ALBUMIN SERPL BCP-MCNC: 3.9 G/DL (ref 3.4–5)
ALBUMIN SERPL BCP-MCNC: 4.2 G/DL (ref 3.4–5)
ANION GAP BLDA CALCULATED.4IONS-SCNC: 10 MMO/L (ref 10–25)
ANION GAP SERPL CALC-SCNC: 15 MMOL/L (ref 10–20)
ANION GAP SERPL CALC-SCNC: 16 MMOL/L (ref 10–20)
ANTIBODY SCREEN: NORMAL
APTT PPP: 28 SECONDS (ref 27–38)
BASE EXCESS BLDA CALC-SCNC: -1.4 MMOL/L (ref -2–3)
BODY TEMPERATURE: 37 DEGREES CELSIUS
BUN SERPL-MCNC: 11 MG/DL (ref 6–23)
BUN SERPL-MCNC: 11 MG/DL (ref 6–23)
CA-I BLD-SCNC: 1.08 MMOL/L (ref 1.1–1.33)
CA-I BLDA-SCNC: 1.13 MMOL/L (ref 1.1–1.33)
CALCIUM SERPL-MCNC: 8.5 MG/DL (ref 8.6–10.6)
CALCIUM SERPL-MCNC: 9.2 MG/DL (ref 8.6–10.6)
CHLORIDE BLDA-SCNC: 104 MMOL/L (ref 98–107)
CHLORIDE SERPL-SCNC: 103 MMOL/L (ref 98–107)
CHLORIDE SERPL-SCNC: 104 MMOL/L (ref 98–107)
CO2 SERPL-SCNC: 25 MMOL/L (ref 21–32)
CO2 SERPL-SCNC: 27 MMOL/L (ref 21–32)
CREAT SERPL-MCNC: 0.67 MG/DL (ref 0.5–1.3)
CREAT SERPL-MCNC: 0.69 MG/DL (ref 0.5–1.3)
ERYTHROCYTE [DISTWIDTH] IN BLOOD BY AUTOMATED COUNT: 12.9 % (ref 11.5–14.5)
ERYTHROCYTE [DISTWIDTH] IN BLOOD BY AUTOMATED COUNT: 13 % (ref 11.5–14.5)
GFR SERPL CREATININE-BSD FRML MDRD: >90 ML/MIN/1.73M*2
GFR SERPL CREATININE-BSD FRML MDRD: >90 ML/MIN/1.73M*2
GLUCOSE BLD MANUAL STRIP-MCNC: 154 MG/DL (ref 74–99)
GLUCOSE BLDA-MCNC: 99 MG/DL (ref 74–99)
GLUCOSE SERPL-MCNC: 80 MG/DL (ref 74–99)
GLUCOSE SERPL-MCNC: 91 MG/DL (ref 74–99)
HCO3 BLDA-SCNC: 24.8 MMOL/L (ref 22–26)
HCT VFR BLD AUTO: 42.8 % (ref 41–52)
HCT VFR BLD AUTO: 47.8 % (ref 41–52)
HCT VFR BLD EST: 44 % (ref 41–52)
HGB BLD-MCNC: 14.1 G/DL (ref 13.5–17.5)
HGB BLD-MCNC: 15.5 G/DL (ref 13.5–17.5)
HGB BLDA-MCNC: 14.7 G/DL (ref 13.5–17.5)
INR PPP: 1.2 (ref 0.9–1.1)
LACTATE BLDA-SCNC: 0.7 MMOL/L (ref 0.4–2)
MAGNESIUM SERPL-MCNC: 2.25 MG/DL (ref 1.6–2.4)
MAGNESIUM SERPL-MCNC: 2.32 MG/DL (ref 1.6–2.4)
MCH RBC QN AUTO: 30.8 PG (ref 26–34)
MCH RBC QN AUTO: 31.1 PG (ref 26–34)
MCHC RBC AUTO-ENTMCNC: 32.4 G/DL (ref 32–36)
MCHC RBC AUTO-ENTMCNC: 32.9 G/DL (ref 32–36)
MCV RBC AUTO: 94 FL (ref 80–100)
MCV RBC AUTO: 95 FL (ref 80–100)
NRBC BLD-RTO: 0 /100 WBCS (ref 0–0)
NRBC BLD-RTO: 0 /100 WBCS (ref 0–0)
OXYHGB MFR BLDA: 95.1 % (ref 94–98)
PCO2 BLDA: 46 MM HG (ref 38–42)
PH BLDA: 7.34 PH (ref 7.38–7.42)
PHOSPHATE SERPL-MCNC: 3.2 MG/DL (ref 2.5–4.9)
PHOSPHATE SERPL-MCNC: 3.4 MG/DL (ref 2.5–4.9)
PLATELET # BLD AUTO: 152 X10*3/UL (ref 150–450)
PLATELET # BLD AUTO: 157 X10*3/UL (ref 150–450)
PO2 BLDA: 102 MM HG (ref 85–95)
POTASSIUM BLDA-SCNC: 4 MMOL/L (ref 3.5–5.3)
POTASSIUM SERPL-SCNC: 4.1 MMOL/L (ref 3.5–5.3)
POTASSIUM SERPL-SCNC: 4.2 MMOL/L (ref 3.5–5.3)
PROTHROMBIN TIME: 13.9 SECONDS (ref 9.8–12.8)
RBC # BLD AUTO: 4.54 X10*6/UL (ref 4.5–5.9)
RBC # BLD AUTO: 5.03 X10*6/UL (ref 4.5–5.9)
RH FACTOR (ANTIGEN D): NORMAL
SAO2 % BLDA: 99 % (ref 94–100)
SODIUM BLDA-SCNC: 135 MMOL/L (ref 136–145)
SODIUM SERPL-SCNC: 140 MMOL/L (ref 136–145)
SODIUM SERPL-SCNC: 142 MMOL/L (ref 136–145)
WBC # BLD AUTO: 11.5 X10*3/UL (ref 4.4–11.3)
WBC # BLD AUTO: 14 X10*3/UL (ref 4.4–11.3)

## 2023-11-22 PROCEDURE — 2500000004 HC RX 250 GENERAL PHARMACY W/ HCPCS (ALT 636 FOR OP/ED): Performed by: NURSE PRACTITIONER

## 2023-11-22 PROCEDURE — 84295 ASSAY OF SERUM SODIUM: CPT

## 2023-11-22 PROCEDURE — 3600000009 HC OR TIME - EACH INCREMENTAL 1 MINUTE - PROCEDURE LEVEL FOUR: Performed by: SURGERY

## 2023-11-22 PROCEDURE — 31622 DX BRONCHOSCOPE/WASH: CPT | Performed by: OTOLARYNGOLOGY

## 2023-11-22 PROCEDURE — A35301 PR THROMBOENDARTECTMY NECK,NECK INCIS: Performed by: STUDENT IN AN ORGANIZED HEALTH CARE EDUCATION/TRAINING PROGRAM

## 2023-11-22 PROCEDURE — 0CJY8ZZ INSPECTION OF MOUTH AND THROAT, VIA NATURAL OR ARTIFICIAL OPENING ENDOSCOPIC: ICD-10-PCS

## 2023-11-22 PROCEDURE — 21501 I&D DP ABSC/HMTMA SFT TS NCK: CPT | Performed by: SURGERY

## 2023-11-22 PROCEDURE — 85027 COMPLETE CBC AUTOMATED: CPT | Performed by: NURSE PRACTITIONER

## 2023-11-22 PROCEDURE — 2020000001 HC ICU ROOM DAILY

## 2023-11-22 PROCEDURE — 71045 X-RAY EXAM CHEST 1 VIEW: CPT | Performed by: RADIOLOGY

## 2023-11-22 PROCEDURE — 2500000002 HC RX 250 W HCPCS SELF ADMINISTERED DRUGS (ALT 637 FOR MEDICARE OP, ALT 636 FOR OP/ED)

## 2023-11-22 PROCEDURE — 82330 ASSAY OF CALCIUM: CPT

## 2023-11-22 PROCEDURE — 36415 COLL VENOUS BLD VENIPUNCTURE: CPT | Performed by: NURSE PRACTITIONER

## 2023-11-22 PROCEDURE — 82947 ASSAY GLUCOSE BLOOD QUANT: CPT

## 2023-11-22 PROCEDURE — 80069 RENAL FUNCTION PANEL: CPT | Performed by: NURSE PRACTITIONER

## 2023-11-22 PROCEDURE — 2500000004 HC RX 250 GENERAL PHARMACY W/ HCPCS (ALT 636 FOR OP/ED): Performed by: ANESTHESIOLOGIST ASSISTANT

## 2023-11-22 PROCEDURE — 2500000001 HC RX 250 WO HCPCS SELF ADMINISTERED DRUGS (ALT 637 FOR MEDICARE OP): Performed by: NURSE PRACTITIONER

## 2023-11-22 PROCEDURE — 85018 HEMOGLOBIN: CPT

## 2023-11-22 PROCEDURE — 71045 X-RAY EXAM CHEST 1 VIEW: CPT | Mod: FY

## 2023-11-22 PROCEDURE — 2720000007 HC OR 272 NO HCPCS: Performed by: SURGERY

## 2023-11-22 PROCEDURE — 99291 CRITICAL CARE FIRST HOUR: CPT

## 2023-11-22 PROCEDURE — 36620 INSERTION CATHETER ARTERY: CPT | Performed by: STUDENT IN AN ORGANIZED HEALTH CARE EDUCATION/TRAINING PROGRAM

## 2023-11-22 PROCEDURE — 85610 PROTHROMBIN TIME: CPT

## 2023-11-22 PROCEDURE — 2500000005 HC RX 250 GENERAL PHARMACY W/O HCPCS

## 2023-11-22 PROCEDURE — 3600000004 HC OR TIME - INITIAL BASE CHARGE - PROCEDURE LEVEL FOUR: Performed by: SURGERY

## 2023-11-22 PROCEDURE — 2500000001 HC RX 250 WO HCPCS SELF ADMINISTERED DRUGS (ALT 637 FOR MEDICARE OP)

## 2023-11-22 PROCEDURE — 2780000003 HC OR 278 NO HCPCS: Performed by: SURGERY

## 2023-11-22 PROCEDURE — A35301 PR THROMBOENDARTECTMY NECK,NECK INCIS

## 2023-11-22 PROCEDURE — 99100 ANES PT EXTEME AGE<1 YR&>70: CPT | Performed by: STUDENT IN AN ORGANIZED HEALTH CARE EDUCATION/TRAINING PROGRAM

## 2023-11-22 PROCEDURE — 2500000004 HC RX 250 GENERAL PHARMACY W/ HCPCS (ALT 636 FOR OP/ED)

## 2023-11-22 PROCEDURE — 86900 BLOOD TYPING SEROLOGIC ABO: CPT

## 2023-11-22 PROCEDURE — 92612 ENDOSCOPY SWALLOW (FEES) VID: CPT | Mod: GN

## 2023-11-22 PROCEDURE — 85730 THROMBOPLASTIN TIME PARTIAL: CPT

## 2023-11-22 PROCEDURE — 83735 ASSAY OF MAGNESIUM: CPT

## 2023-11-22 PROCEDURE — 99222 1ST HOSP IP/OBS MODERATE 55: CPT | Performed by: OTOLARYNGOLOGY

## 2023-11-22 PROCEDURE — 37799 UNLISTED PX VASCULAR SURGERY: CPT

## 2023-11-22 PROCEDURE — 83735 ASSAY OF MAGNESIUM: CPT | Performed by: NURSE PRACTITIONER

## 2023-11-22 PROCEDURE — 3700000001 HC GENERAL ANESTHESIA TIME - INITIAL BASE CHARGE: Performed by: SURGERY

## 2023-11-22 PROCEDURE — 85027 COMPLETE CBC AUTOMATED: CPT

## 2023-11-22 PROCEDURE — 36415 COLL VENOUS BLD VENIPUNCTURE: CPT

## 2023-11-22 PROCEDURE — 71045 X-RAY EXAM CHEST 1 VIEW: CPT | Performed by: STUDENT IN AN ORGANIZED HEALTH CARE EDUCATION/TRAINING PROGRAM

## 2023-11-22 PROCEDURE — 3700000002 HC GENERAL ANESTHESIA TIME - EACH INCREMENTAL 1 MINUTE: Performed by: SURGERY

## 2023-11-22 PROCEDURE — 99140 ANES COMP EMERGENCY COND: CPT | Performed by: STUDENT IN AN ORGANIZED HEALTH CARE EDUCATION/TRAINING PROGRAM

## 2023-11-22 PROCEDURE — 0KC30ZZ EXTIRPATION OF MATTER FROM LEFT NECK MUSCLE, OPEN APPROACH: ICD-10-PCS | Performed by: SURGERY

## 2023-11-22 PROCEDURE — 31505 DIAGNOSTIC LARYNGOSCOPY: CPT | Performed by: OTOLARYNGOLOGY

## 2023-11-22 PROCEDURE — 0BJ08ZZ INSPECTION OF TRACHEOBRONCHIAL TREE, VIA NATURAL OR ARTIFICIAL OPENING ENDOSCOPIC: ICD-10-PCS | Performed by: OTOLARYNGOLOGY

## 2023-11-22 PROCEDURE — 2500000004 HC RX 250 GENERAL PHARMACY W/ HCPCS (ALT 636 FOR OP/ED): Performed by: SURGERY

## 2023-11-22 PROCEDURE — 94002 VENT MGMT INPAT INIT DAY: CPT

## 2023-11-22 RX ORDER — CEFAZOLIN SODIUM 2 G/100ML
2 INJECTION, SOLUTION INTRAVENOUS EVERY 8 HOURS
Status: DISCONTINUED | OUTPATIENT
Start: 2023-11-22 | End: 2023-11-22

## 2023-11-22 RX ORDER — LIDOCAINE HYDROCHLORIDE 40 MG/ML
SOLUTION TOPICAL AS NEEDED
Status: DISCONTINUED | OUTPATIENT
Start: 2023-11-22 | End: 2023-11-22

## 2023-11-22 RX ORDER — CEFAZOLIN SODIUM 2 G/100ML
2 INJECTION, SOLUTION INTRAVENOUS EVERY 8 HOURS
Status: COMPLETED | OUTPATIENT
Start: 2023-11-22 | End: 2023-11-23

## 2023-11-22 RX ORDER — CEFAZOLIN 1 G/1
INJECTION, POWDER, FOR SOLUTION INTRAVENOUS AS NEEDED
Status: DISCONTINUED | OUTPATIENT
Start: 2023-11-22 | End: 2023-11-22

## 2023-11-22 RX ORDER — ROCURONIUM BROMIDE 10 MG/ML
INJECTION, SOLUTION INTRAVENOUS AS NEEDED
Status: DISCONTINUED | OUTPATIENT
Start: 2023-11-22 | End: 2023-11-22

## 2023-11-22 RX ORDER — INSULIN LISPRO 100 [IU]/ML
0-5 INJECTION, SOLUTION INTRAVENOUS; SUBCUTANEOUS EVERY 4 HOURS
Status: DISCONTINUED | OUTPATIENT
Start: 2023-11-22 | End: 2023-11-23

## 2023-11-22 RX ORDER — POTASSIUM CHLORIDE 14.9 MG/ML
20 INJECTION INTRAVENOUS EVERY 6 HOURS PRN
Status: DISCONTINUED | OUTPATIENT
Start: 2023-11-22 | End: 2023-11-23

## 2023-11-22 RX ORDER — CALCIUM GLUCONATE 20 MG/ML
2 INJECTION, SOLUTION INTRAVENOUS EVERY 6 HOURS PRN
Status: DISCONTINUED | OUTPATIENT
Start: 2023-11-22 | End: 2023-11-23

## 2023-11-22 RX ORDER — SUCCINYLCHOLINE CHLORIDE 20 MG/ML
INJECTION INTRAMUSCULAR; INTRAVENOUS AS NEEDED
Status: DISCONTINUED | OUTPATIENT
Start: 2023-11-22 | End: 2023-11-22

## 2023-11-22 RX ORDER — DEXTROSE 50 % IN WATER (D50W) INTRAVENOUS SYRINGE
25
Status: DISCONTINUED | OUTPATIENT
Start: 2023-11-22 | End: 2023-11-23

## 2023-11-22 RX ORDER — DEXAMETHASONE SODIUM PHOSPHATE 100 MG/10ML
INJECTION INTRAMUSCULAR; INTRAVENOUS AS NEEDED
Status: DISCONTINUED | OUTPATIENT
Start: 2023-11-22 | End: 2023-11-22

## 2023-11-22 RX ORDER — DEXTROSE MONOHYDRATE 100 MG/ML
0.3 INJECTION, SOLUTION INTRAVENOUS ONCE AS NEEDED
Status: DISCONTINUED | OUTPATIENT
Start: 2023-11-22 | End: 2023-11-23

## 2023-11-22 RX ORDER — PROPOFOL 10 MG/ML
INJECTION, EMULSION INTRAVENOUS AS NEEDED
Status: DISCONTINUED | OUTPATIENT
Start: 2023-11-22 | End: 2023-11-22

## 2023-11-22 RX ORDER — PHENYLEPHRINE HYDROCHLORIDE 10 MG/ML
INJECTION INTRAVENOUS AS NEEDED
Status: DISCONTINUED | OUTPATIENT
Start: 2023-11-22 | End: 2023-11-22

## 2023-11-22 RX ORDER — DEXMEDETOMIDINE HYDROCHLORIDE 4 UG/ML
INJECTION, SOLUTION INTRAVENOUS CONTINUOUS PRN
Status: DISCONTINUED | OUTPATIENT
Start: 2023-11-22 | End: 2023-11-22

## 2023-11-22 RX ORDER — HYDROMORPHONE HYDROCHLORIDE 1 MG/ML
0.2 INJECTION, SOLUTION INTRAMUSCULAR; INTRAVENOUS; SUBCUTANEOUS
Status: DISCONTINUED | OUTPATIENT
Start: 2023-11-22 | End: 2023-11-23

## 2023-11-22 RX ORDER — MAGNESIUM SULFATE HEPTAHYDRATE 40 MG/ML
4 INJECTION, SOLUTION INTRAVENOUS EVERY 6 HOURS PRN
Status: DISCONTINUED | OUTPATIENT
Start: 2023-11-22 | End: 2023-11-23

## 2023-11-22 RX ORDER — PROPOFOL 10 MG/ML
5-50 INJECTION, EMULSION INTRAVENOUS CONTINUOUS
Status: DISCONTINUED | OUTPATIENT
Start: 2023-11-22 | End: 2023-11-23

## 2023-11-22 RX ORDER — DEXAMETHASONE SODIUM PHOSPHATE 4 MG/ML
10 INJECTION, SOLUTION INTRA-ARTICULAR; INTRALESIONAL; INTRAMUSCULAR; INTRAVENOUS; SOFT TISSUE EVERY 8 HOURS
Status: COMPLETED | OUTPATIENT
Start: 2023-11-22 | End: 2023-11-23

## 2023-11-22 RX ORDER — MIDAZOLAM HYDROCHLORIDE 1 MG/ML
INJECTION INTRAMUSCULAR; INTRAVENOUS AS NEEDED
Status: DISCONTINUED | OUTPATIENT
Start: 2023-11-22 | End: 2023-11-22

## 2023-11-22 RX ORDER — CALCIUM GLUCONATE 20 MG/ML
1 INJECTION, SOLUTION INTRAVENOUS EVERY 6 HOURS PRN
Status: DISCONTINUED | OUTPATIENT
Start: 2023-11-22 | End: 2023-11-23

## 2023-11-22 RX ORDER — SODIUM CHLORIDE, SODIUM LACTATE, POTASSIUM CHLORIDE, CALCIUM CHLORIDE 600; 310; 30; 20 MG/100ML; MG/100ML; MG/100ML; MG/100ML
20 INJECTION, SOLUTION INTRAVENOUS CONTINUOUS
Status: DISCONTINUED | OUTPATIENT
Start: 2023-11-22 | End: 2023-11-23

## 2023-11-22 RX ORDER — FENTANYL CITRATE 50 UG/ML
INJECTION, SOLUTION INTRAMUSCULAR; INTRAVENOUS AS NEEDED
Status: DISCONTINUED | OUTPATIENT
Start: 2023-11-22 | End: 2023-11-22

## 2023-11-22 RX ORDER — PHENYLEPHRINE HCL IN 0.9% NACL 0.4MG/10ML
SYRINGE (ML) INTRAVENOUS AS NEEDED
Status: DISCONTINUED | OUTPATIENT
Start: 2023-11-22 | End: 2023-11-22

## 2023-11-22 RX ORDER — PANTOPRAZOLE SODIUM 40 MG/10ML
40 INJECTION, POWDER, LYOPHILIZED, FOR SOLUTION INTRAVENOUS
Status: DISCONTINUED | OUTPATIENT
Start: 2023-11-23 | End: 2023-11-26 | Stop reason: HOSPADM

## 2023-11-22 RX ORDER — ATORVASTATIN CALCIUM 80 MG/1
80 TABLET, FILM COATED ORAL NIGHTLY
Status: DISCONTINUED | OUTPATIENT
Start: 2023-11-22 | End: 2023-11-26 | Stop reason: HOSPADM

## 2023-11-22 RX ORDER — MAGNESIUM SULFATE HEPTAHYDRATE 40 MG/ML
2 INJECTION, SOLUTION INTRAVENOUS EVERY 6 HOURS PRN
Status: DISCONTINUED | OUTPATIENT
Start: 2023-11-22 | End: 2023-11-23

## 2023-11-22 RX ORDER — MAGNESIUM SULFATE HEPTAHYDRATE 40 MG/ML
4 INJECTION, SOLUTION INTRAVENOUS ONCE
Status: COMPLETED | OUTPATIENT
Start: 2023-11-22 | End: 2023-11-22

## 2023-11-22 RX ORDER — PROPOFOL 10 MG/ML
INJECTION, EMULSION INTRAVENOUS CONTINUOUS PRN
Status: DISCONTINUED | OUTPATIENT
Start: 2023-11-22 | End: 2023-11-22

## 2023-11-22 RX ADMIN — DEXAMETHASONE SODIUM PHOSPHATE 10 MG: 10 INJECTION INTRAMUSCULAR; INTRAVENOUS at 13:54

## 2023-11-22 RX ADMIN — PHENYLEPHRINE HYDROCHLORIDE 120 MCG: 10 INJECTION INTRAVENOUS at 14:31

## 2023-11-22 RX ADMIN — HYDROCHLOROTHIAZIDE 25 MG: 25 TABLET ORAL at 10:43

## 2023-11-22 RX ADMIN — PROPOFOL 70 MG: 10 INJECTION, EMULSION INTRAVENOUS at 13:50

## 2023-11-22 RX ADMIN — ROCURONIUM BROMIDE 60 MG: 10 INJECTION, SOLUTION INTRAVENOUS at 13:54

## 2023-11-22 RX ADMIN — LIDOCAINE HYDROCHLORIDE 4 ML: 40 SOLUTION TOPICAL at 13:43

## 2023-11-22 RX ADMIN — SUCCINYLCHOLINE CHLORIDE 160 MG: 20 INJECTION, SOLUTION INTRAMUSCULAR; INTRAVENOUS at 13:50

## 2023-11-22 RX ADMIN — INSULIN LISPRO 1 UNITS: 100 INJECTION, SOLUTION INTRAVENOUS; SUBCUTANEOUS at 20:51

## 2023-11-22 RX ADMIN — PROPOFOL 50 MCG/KG/MIN: 10 INJECTION, EMULSION INTRAVENOUS at 21:05

## 2023-11-22 RX ADMIN — DEXAMETHASONE SODIUM PHOSPHATE 10 MG: 4 INJECTION, SOLUTION INTRAMUSCULAR; INTRAVENOUS at 21:05

## 2023-11-22 RX ADMIN — ESCITALOPRAM OXALATE 5 MG: 10 TABLET ORAL at 10:43

## 2023-11-22 RX ADMIN — PHENYLEPHRINE HYDROCHLORIDE 160 MCG: 10 INJECTION INTRAVENOUS at 14:20

## 2023-11-22 RX ADMIN — MIDAZOLAM HYDROCHLORIDE 2 MG: 1 INJECTION, SOLUTION INTRAMUSCULAR; INTRAVENOUS at 13:36

## 2023-11-22 RX ADMIN — SODIUM CHLORIDE, POTASSIUM CHLORIDE, SODIUM LACTATE AND CALCIUM CHLORIDE 100 ML/HR: 600; 310; 30; 20 INJECTION, SOLUTION INTRAVENOUS at 16:00

## 2023-11-22 RX ADMIN — SODIUM CHLORIDE, POTASSIUM CHLORIDE, SODIUM LACTATE AND CALCIUM CHLORIDE 500 ML: 600; 310; 30; 20 INJECTION, SOLUTION INTRAVENOUS at 16:05

## 2023-11-22 RX ADMIN — ACETAMINOPHEN 650 MG: 325 TABLET ORAL at 04:11

## 2023-11-22 RX ADMIN — CEFAZOLIN SODIUM 2 G: 2 INJECTION, SOLUTION INTRAVENOUS at 18:25

## 2023-11-22 RX ADMIN — OXYCODONE HYDROCHLORIDE 5 MG: 5 TABLET ORAL at 04:12

## 2023-11-22 RX ADMIN — SODIUM CHLORIDE: 9 INJECTION, SOLUTION INTRAVENOUS at 13:25

## 2023-11-22 RX ADMIN — FENTANYL CITRATE 50 MCG: 50 INJECTION, SOLUTION INTRAMUSCULAR; INTRAVENOUS at 14:12

## 2023-11-22 RX ADMIN — FENTANYL CITRATE 50 MCG: 50 INJECTION, SOLUTION INTRAMUSCULAR; INTRAVENOUS at 14:24

## 2023-11-22 RX ADMIN — PROPOFOL 50 MCG/KG/MIN: 10 INJECTION, EMULSION INTRAVENOUS at 15:04

## 2023-11-22 RX ADMIN — PROPOFOL 40 MCG/KG/MIN: 10 INJECTION, EMULSION INTRAVENOUS at 16:11

## 2023-11-22 RX ADMIN — LIDOCAINE HYDROCHLORIDE 4 ML: 40 SOLUTION TOPICAL at 13:34

## 2023-11-22 RX ADMIN — MAGNESIUM SULFATE IN WATER 4 G: 40 INJECTION, SOLUTION INTRAVENOUS at 10:42

## 2023-11-22 RX ADMIN — CEFAZOLIN 2 G: 330 INJECTION, POWDER, FOR SOLUTION INTRAMUSCULAR; INTRAVENOUS at 14:09

## 2023-11-22 RX ADMIN — CALCIUM GLUCONATE 1 G: 20 INJECTION, SOLUTION INTRAVENOUS at 17:48

## 2023-11-22 RX ADMIN — Medication 80 MCG: at 15:00

## 2023-11-22 RX ADMIN — HYDROMORPHONE HYDROCHLORIDE 0.5 MG: 1 INJECTION, SOLUTION INTRAMUSCULAR; INTRAVENOUS; SUBCUTANEOUS at 21:18

## 2023-11-22 RX ADMIN — SODIUM CHLORIDE, SODIUM LACTATE, POTASSIUM CHLORIDE, AND CALCIUM CHLORIDE: 600; 310; 30; 20 INJECTION, SOLUTION INTRAVENOUS at 13:24

## 2023-11-22 RX ADMIN — ASPIRIN 81 MG CHEWABLE TABLET 81 MG: 81 TABLET CHEWABLE at 10:35

## 2023-11-22 RX ADMIN — Medication 120 MCG: at 13:54

## 2023-11-22 RX ADMIN — DEXMEDETOMIDINE HYDROCHLORIDE 0.5 MCG/KG/HR: 4 INJECTION, SOLUTION INTRAVENOUS at 13:35

## 2023-11-22 RX ADMIN — ACETAMINOPHEN 650 MG: 325 TABLET ORAL at 10:44

## 2023-11-22 ASSESSMENT — COGNITIVE AND FUNCTIONAL STATUS - GENERAL
DAILY ACTIVITIY SCORE: 18
TOILETING: A LITTLE
DRESSING REGULAR LOWER BODY CLOTHING: A LITTLE
DRESSING REGULAR UPPER BODY CLOTHING: A LITTLE
PERSONAL GROOMING: A LITTLE
HELP NEEDED FOR BATHING: A LITTLE
MOBILITY SCORE: 24
EATING MEALS: A LITTLE

## 2023-11-22 ASSESSMENT — PAIN DESCRIPTION - ORIENTATION: ORIENTATION: ANTERIOR

## 2023-11-22 ASSESSMENT — PAIN - FUNCTIONAL ASSESSMENT
PAIN_FUNCTIONAL_ASSESSMENT: CPOT (CRITICAL CARE PAIN OBSERVATION TOOL)
PAIN_FUNCTIONAL_ASSESSMENT: 0-10
PAIN_FUNCTIONAL_ASSESSMENT: CPOT (CRITICAL CARE PAIN OBSERVATION TOOL)
PAIN_FUNCTIONAL_ASSESSMENT: 0-10
PAIN_FUNCTIONAL_ASSESSMENT: 0-10

## 2023-11-22 ASSESSMENT — PAIN SCALES - GENERAL
PAINLEVEL_OUTOF10: 3
PAINLEVEL_OUTOF10: 0 - NO PAIN
PAINLEVEL_OUTOF10: 0 - NO PAIN
PAIN_LEVEL: 0
PAINLEVEL_OUTOF10: 6

## 2023-11-22 ASSESSMENT — PAIN DESCRIPTION - LOCATION: LOCATION: HEAD

## 2023-11-22 NOTE — ANESTHESIA PROCEDURE NOTES
Arterial Line:    Date/Time: 11/22/2023 2:02 PM    Staffing  Performed: attending   Authorized by: Dorothy Peralta DO    Performed by: Dorothy Peralta DO    An arterial line was placed. Procedure performed using surface landmarks.in the ICU for the following indication(s): continuous blood pressure monitoring and blood sampling needed.    A 20 gauge (size), 1 and 3/4 inch (length), Angiocath (type) catheter was placed into the Left radial artery, secured by Tegaderm,   Seldinger technique not used.  Events:  patient tolerated procedure well with no complications.

## 2023-11-22 NOTE — SIGNIFICANT EVENT
Bedside Checks    Reason: Check for progression of L neck hematoma (pod 1 L CEA), mild hoarseness, on NC since procedure    Assessment:    1830: mild hoarseness, sore throat, no difficulty swallowing, not SOB or feeling like throat is swelling. L neck dressing CDI    0000: Patient's neck was noted to be more swollen expanding anteriorly near the neck and posteriorly towards the mandible. On palpation the edges were soft. The hematoma was mobile. Patient's airway was midline. Patient was asymptomatic, continues to have mild hoarseness, neuro intact, no changes in oxygen requirement. Chief resident was brought to bedside    0200: Hematoma and swelling stable, patient continues to be asymptomatic.    0400: Hematoma and swelling stable, patient continues to be asymptomatic.    Plan:  -Keep HOB up  -Monitor for symptoms (worsening hoarseness, worsening respiratory status, difficulty swallowing)  -q2h bedside checks overnight to monitor progress of hematoma/swelling    Armand Roger MD  PGY1  General Surgery  Vascular Surgery [id number]

## 2023-11-22 NOTE — ANESTHESIA PROCEDURE NOTES
Airway  Date/Time: 11/22/2023 1:53 PM  Urgency: elective    Difficult airway    Staffing  Performed: CRNA and attending   Authorized by: Dorothy Peralta DO    Performed by: ELIOT Nicole-CARLOS  Patient location during procedure: OR    Indications and Patient Condition  Indications for airway management: anesthesia  Spontaneous ventilation: present  Sedation level: no sedation  Preoxygenated: yes  Patient position: sniffing (Back up 45 degrees)  MILS maintained throughout  Mask difficulty assessment: 0 - not attempted    Final Airway Details  Final airway type: endotracheal airway      Successful airway: ETT  Cuffed: yes   Successful intubation technique: flexible bronchoscopy (attempted awake fiberoptic without success, converted to Glidescope with success.)  Endotracheal tube insertion site: oral  ETT size (mm): 7.0  Placement verified by: chest auscultation and capnometry   Measured from: lips  ETT to lips (cm): 22  Number of attempts at approach: 1  Number of other approaches attempted: 1    Other Attempts  Unsuccessful attempted endotracheal techniques: video laryngoscopy    Additional Comments  Patient sat up to 45 degrees in OR bed. 4% lidocaine nebulizer, afrin spray, and topical anesthetic to oropharynx is administered for awake fiberoptic. Patient awake, compliant and voicing numbness.  Flexible bronchoscope is inserted with difficulty obtaining optimal view, and placement of ETT is not achieved. Patient induced intravenously and glidescope was used to place 7.0 ETT with confirmation of placement with bronchoscope.

## 2023-11-22 NOTE — BRIEF OP NOTE
Date: 2023  OR Location: [facility] OR    Name: David Block, : 1950, Age: 73 y.o., MRN: [id number], Sex: male    Diagnosis  Pre-op Diagnosis     * Symptomatic stenosis of left carotid artery [I65.22]     * Acute on chronic respiratory failure, unspecified whether with hypoxia or hypercapnia (CMS/HCC) [J96.20]     * Edema of larynx [J38.4]     * Hematoma of neck, sequela [S10.93XS] Post-op Diagnosis     * Symptomatic stenosis of left carotid artery [I65.22]     Procedures  Left neck hematoma evacuation and washout    Surgeons      * Erickson Morgan - Primary    Resident/Fellow/Other Assistant:  Surgeon(s) and Role:     * Abbey Bullock MD - Assisting     * Nikolas Canales MD - Fellow     * Casandra Dawn MD - Fellow    Procedure Summary  Anesthesia: General  ASA: III  Anesthesia Staff: Anesthesiologist: Dorothy Peralta DO  CRNA: ELIOT Nicole-CRNA  C-AA: EDNA Meléndez  Estimated Blood Loss: 5 mL with 50 mL clot removed  Intra-op Medications:   Medication Name Total Dose   heparin (porcine) 5,000 Units in sodium chloride 0.9% 100 mL irrigation 5,000 Units   atorvastatin (Lipitor) tablet 80 mg Cannot be calculated   benzocaine-menthol (Cepastat Sore Throat) 15-3.6 mg lozenge 1 lozenge Cannot be calculated   heparin (porcine) injection 5,000 Units Cannot be calculated   magnesium sulfate IV 4 g Cannot be calculated              Anesthesia Record               Intraprocedure I/O Totals          Intake    Dexmedetomidine 0.00 mL    The total shown is the total volume documented since Anesthesia Start was filed.    LR 1000.00 mL    Propofol Drip 0.00 mL    The total shown is the total volume documented since Anesthesia Start was filed.    Total Intake 1000 mL          Specimen: No specimens collected     Staff:   Circulator: Kristian Ordaz RN; Juan Carlos Peterson RN; Ирина Jacobo RN  Scrub Person: Iliana Castano; Chary Ordaz          Findings: Approximately 50 mL of old clot and hematoma evacuated.  No active bleeding.     Complications:  None; patient tolerated the procedure well.     Disposition: ICU - intubated and hemodynamically stable.  Condition: stable  Specimens Collected: No specimens collected  Attending Attestation: I was present and scrubbed for the entire procedure.    Abbey Bullock

## 2023-11-22 NOTE — ANESTHESIA POSTPROCEDURE EVALUATION
Patient: David Block    Procedure Summary       Date: 11/22/23 Room / Location: [facility] OR 16 / Virtual [facility] OR    Anesthesia Start: 1315 Anesthesia Stop: 1526    Procedure: Endarterectomy Carotid Artery (Left) Diagnosis:       Symptomatic stenosis of left carotid artery      (Symptomatic stenosis of left carotid artery [I65.22])    Surgeons: Erickson Morgan MD Responsible Provider: Dorothy Peralta DO    Anesthesia Type: general ASA Status: 3 - Emergent            Anesthesia Type: general    Vitals Value Taken Time   /58 11/22/23 1553   Temp 36.1 °C (97 °F) 11/22/23 1530   Pulse 67 11/22/23 1552   Resp 16 11/22/23 1552   SpO2 96 % 11/22/23 1552   Vitals shown include unvalidated device data.    Anesthesia Post Evaluation    Patient location during evaluation: ICU  Patient participation: complete - patient cannot participate  Level of consciousness: responsive to physical stimuli (intubated and sedated)  Pain score: 0  Pain management: adequate  Airway patency: patent (ETT in situ)  Cardiovascular status: acceptable and hemodynamically stable  Respiratory status: ETT and intubated  Hydration status: acceptable  Postoperative Nausea and Vomiting: none  Comments: Patient transported to ICU with ICU monitors, stable throughout, with ETT in situ and hand ventilated. Propofol infusion for sedation. Connected to ventilator on arrival, report given to SICU team. Patient stable.         No notable events documented.

## 2023-11-22 NOTE — ANESTHESIA PROCEDURE NOTES
Peripheral IV  Date/Time: 11/22/2023 2:00 PM      Placement  Needle size: 16 G  Laterality: right  Location: forearm  Site prep: alcohol  Technique: anatomical landmarks  Attempts: 1

## 2023-11-22 NOTE — PROGRESS NOTES
"David Block is a 73 y.o. male on day 2 of admission presenting with Symptomatic stenosis of left carotid artery.    Subjective   Patient complains of continued hoarseness and difficulty swallowing. Also reports new frontal/orbital headache. Denies chest pain, shortness of breath, dizziness, vision changes, numbness or weakness to the extremities, or speech difficulty.       Objective     Physical Exam  Constitutional: Lying in bed; not in acute distress  HEENT: no scleral icterus; L neck CDI, removed at bedside, small neck hematoma, stable and soft. Phonating appropriately. Tongue midline, smile symmetric  Cardiac: regular rate, normotensive   Pulmonary: saturating appropriately on supplemental O2  GI: Abdomen soft, non-tender, non- distended  Extremities: Warm and well perfused; no peripheral edema present; palpable radial pulse bilaterally  MSK: 5/5 strength bilateral UE/LE  Neuro: A&Ox3; no focal deficits  Psych: Appropriate mood and affect    Last Recorded Vitals  Blood pressure 144/81, pulse 88, temperature 36.9 °C (98.4 °F), temperature source Temporal, resp. rate 18, height 1.702 m (5' 7\"), weight 86.6 kg (190 lb 14.7 oz), SpO2 92 %.  Intake/Output last 3 Shifts:  I/O last 3 completed shifts:  In: 2908.5 (33.6 mL/kg) [P.O.:2030; I.V.:78.5 (0.9 mL/kg); Blood:250; IV Piggyback:550]  Out: 3110 (35.9 mL/kg) [Urine:3110 (1 mL/kg/hr)]  Weight: 86.6 kg     Relevant Results  Results for orders placed or performed during the hospital encounter of 11/20/23 (from the past 24 hour(s))   ECG 12 lead   Result Value Ref Range    Ventricular Rate 61 BPM    Atrial Rate 61 BPM    CA Interval 176 ms    QRS Duration 72 ms    QT Interval 416 ms    QTC Calculation(Bazett) 418 ms    P Axis 57 degrees    R Axis 76 degrees    T Axis 41 degrees    QRS Count 10 beats    Q Onset 223 ms    P Onset 135 ms    P Offset 194 ms    T Offset 431 ms    QTC Fredericia 418 ms   POCT GLUCOSE   Result Value Ref Range    POCT Glucose 148 (H) 74 - 99 " mg/dL   Light Blue Top   Result Value Ref Range    Extra Tube Hold for add-ons.    Lavender Top   Result Value Ref Range    Extra Tube Hold for add-ons.    Basic Metabolic Panel   Result Value Ref Range    Glucose 76 74 - 99 mg/dL    Sodium 141 136 - 145 mmol/L    Potassium 4.4 3.5 - 5.3 mmol/L    Chloride 102 98 - 107 mmol/L    Bicarbonate 27 21 - 32 mmol/L    Anion Gap 16 10 - 20 mmol/L    Urea Nitrogen 10 6 - 23 mg/dL    Creatinine 0.81 0.50 - 1.30 mg/dL    eGFR >90 >60 mL/min/1.73m*2    Calcium 9.4 8.6 - 10.6 mg/dL   CBC   Result Value Ref Range    WBC 11.5 (H) 4.4 - 11.3 x10*3/uL    nRBC 0.0 0.0 - 0.0 /100 WBCs    RBC 5.03 4.50 - 5.90 x10*6/uL    Hemoglobin 15.5 13.5 - 17.5 g/dL    Hematocrit 47.8 41.0 - 52.0 %    MCV 95 80 - 100 fL    MCH 30.8 26.0 - 34.0 pg    MCHC 32.4 32.0 - 36.0 g/dL    RDW 13.0 11.5 - 14.5 %    Platelets 157 150 - 450 x10*3/uL   Magnesium   Result Value Ref Range    Magnesium 2.32 1.60 - 2.40 mg/dL   Renal function panel   Result Value Ref Range    Glucose 80 74 - 99 mg/dL    Sodium 142 136 - 145 mmol/L    Potassium 4.2 3.5 - 5.3 mmol/L    Chloride 103 98 - 107 mmol/L    Bicarbonate 27 21 - 32 mmol/L    Anion Gap 16 10 - 20 mmol/L    Urea Nitrogen 11 6 - 23 mg/dL    Creatinine 0.69 0.50 - 1.30 mg/dL    eGFR >90 >60 mL/min/1.73m*2    Calcium 9.2 8.6 - 10.6 mg/dL    Phosphorus 3.2 2.5 - 4.9 mg/dL    Albumin 4.2 3.4 - 5.0 g/dL         Scheduled medications  acetaminophen, 650 mg, oral, q6h  aspirin, 81 mg, oral, Daily  [Held by provider] bisoprolol, 2.5 mg, oral, Daily  cholecalciferol, 5,000 Units, oral, Daily  escitalopram, 5 mg, oral, Daily  [Held by provider] heparin (porcine), 5,000 Units, subcutaneous, q8h  [Held by provider] hydroCHLOROthiazide, 25 mg, oral, q AM  [Held by provider] losartan, 25 mg, oral, Daily      Continuous medications     PRN medications  PRN medications: benzocaine-menthol, HYDROmorphone, labetaloL, oxyCODONE, oxyCODONE, oxygen        Assessment/Plan   David LUGO  Mckayla is a 73 year old male with PMH of idiopathic pulmonary fibrosis (being evaluated for lung transplant), and severe left carotid artery stenosis. He is s/p left carotid endarterectomy by Dr. Morgan on 11/20/23. Post-operative course notable for left neck hematoma, hoarseness, and frontal/orbital headache. No SOB, tightness of the skin, or focal deficits.    Plan  Neuro: Acute post op pain,   -Continue pain regimen of Tylenol, Oxycodone and dilaudid for breakthrough pain  -Continue neuro checks q4h  -Maintain HOB >30 degrees at all times  -Magnesium 4 mg IV for headache  -Monitor for signs of worsening headache  -Continue home Lexapro    CV: Carotid artery stenosis, HTN, HLD, CAD with recent coronary heart cath (9/2023)  -Continue ASA  -Resume statin  -Blood pressure control    Pulm: Hx of idiopathic pulmonary fibrosis (on 4L NC at home with activity)  -IS q1h while awake  -Continue supplemental O2 as needed  -Continuous pulse ox  -Monitor for signs of respiratory compromise    FENGI: BPH, vitamin B deficiency  -Continue NPO  -FEES with SLP to evaluate dysphagia  -I&Os  -RFP as clinically indicated  -Replete electrolytes to maintain K >4 and Mg >2  -Continue vitamin D    Endo:  -No issues    Heme: Left neck hematoma  -Daily CBC  -Monitor for enlarging hematoma     ID:  -Afebrile  -CBC as clinically indicated  -Perioperative Ancef completed    MSK: Delma OA  -PT eval    Ppx:  -SCDs    Dispo: continue care on nursing floor       ELIOT Aguero-CNP

## 2023-11-22 NOTE — CARE PLAN
Problem: Skin  Goal: Decreased wound size/increased tissue granulation at next dressing change  Outcome: Progressing  Goal: Participates in plan/prevention/treatment measures  Outcome: Progressing  Goal: Prevent/manage excess moisture  Outcome: Progressing  Goal: Prevent/minimize sheer/friction injuries  Outcome: Progressing  Goal: Promote/optimize nutrition  Outcome: Progressing  Goal: Promote skin healing  Outcome: Progressing     Problem: Fall/Injury  Goal: Not fall by end of shift  Outcome: Progressing  Goal: Be free from injury by end of the shift  Outcome: Progressing  Goal: Verbalize understanding of personal risk factors for fall in the hospital  Outcome: Progressing  Goal: Verbalize understanding of risk factor reduction measures to prevent injury from fall in the home  Outcome: Progressing  Goal: Use assistive devices by end of the shift  Outcome: Progressing  Goal: Pace activities to prevent fatigue by end of the shift  Outcome: Progressing     Problem: Pain  Goal: Takes deep breaths with improved pain control throughout the shift  Outcome: Progressing  Goal: Turns in bed with improved pain control throughout the shift  Outcome: Progressing  Goal: Walks with improved pain control throughout the shift  Outcome: Progressing  Goal: Performs ADL's with improved pain control throughout shift  Outcome: Progressing  Goal: Participates in PT with improved pain control throughout the shift  Outcome: Progressing  Goal: Free from opioid side effects throughout the shift  Outcome: Progressing  Goal: Free from acute confusion related to pain meds throughout the shift  Outcome: Progressing     Problem: Pain  Goal: My pain/discomfort is manageable  Outcome: Progressing     Problem: Safety  Goal: Patient will be injury free during hospitalization  Outcome: Progressing  Goal: I will remain free of falls  Outcome: Progressing     Problem: Daily Care  Goal: Daily care needs are met  Outcome: Progressing     Problem:  Psychosocial Needs  Goal: Demonstrates ability to cope with hospitalization/illness  Outcome: Progressing  Goal: Collaborate with me, my family, and caregiver to identify my specific goals  Outcome: Progressing     Problem: Discharge Barriers  Goal: My discharge needs are met  Outcome: Progressing   The patient's goals for the shift include      The clinical goals for the shift include Pain will be managed throughout shift

## 2023-11-22 NOTE — PROGRESS NOTES
Physical Therapy                 Therapy Communication Note    Patient Name: David Block  MRN: [id number]  Today's Date: 11/22/2023     Discipline: Physical Therapy    Missed Visit Reason: Missed Visit Reason:  (Pt off the floor at OR. Will reattempt as appropriate.)    Missed Time: Attempt

## 2023-11-22 NOTE — H&P
History Of Present Illness  David Block is a 73 y.o. male presenting with a history of idiopathic pulmonary fibrosis and is being evaluated for a lung transplant. He was found to have severe left carotid artery stenosis on recent carotid duplex ultrasound with a PSV/EDV of 649/311 and a ratio of 9.7. Left carotid endarterectomy by Dr. Morgan on 11/20/2023. Since last admission to ICU, patient has been HDS. On FEES 11/22, there was significant edema and ecchymosis of the L arytenoid with prolapsed appearance Decision was made to go to OR. Arrives s/p left neck hematoma evacuation and washout. EBL 5ml, 50mL of clot removed. Arrives to SICU sedated and intubated.  Neck at surgical site is soft.       Past Medical History  Past Medical History:   Diagnosis Date    BPH (benign prostatic hyperplasia)     Cancer of cheek (CMS/HCC)     Hyperlipidemia     Hypertension     Idiopathic pulmonary fibrosis (CMS/HCC)        Surgical History  Past Surgical History:   Procedure Laterality Date    BACK SURGERY      COLONOSCOPY      CT ANGIO NECK  11/13/2023    CT ANGIO NECK 11/13/2023 [facility] CT    CYSTOSCOPY      KNEE SURGERY          Social History  He reports that he quit smoking about 13 years ago. His smoking use included cigarettes. He has never used smokeless tobacco. No history on file for alcohol use and drug use.    Family History  Family History   Problem Relation Name Age of Onset    Heart attack Father          Allergies  Nifedipine    Review of Systems   Reason unable to perform ROS: Patient is sedated and intubated.        Physical Exam  Constitutional:       Comments: Sedated   HENT:      Head: Normocephalic and atraumatic.      Mouth/Throat:      Comments: ET tube in place  Eyes:      Conjunctiva/sclera: Conjunctivae normal.   Cardiovascular:      Rate and Rhythm: Normal rate and regular rhythm.      Heart sounds: Normal heart sounds. No murmur heard.     No gallop.   Pulmonary:      Effort: Pulmonary effort is normal. No  "respiratory distress.      Breath sounds: Normal breath sounds. No stridor. No wheezing, rhonchi or rales.   Abdominal:      General: Abdomen is flat.      Palpations: Abdomen is soft.   Skin:     General: Skin is warm and dry.      Capillary Refill: Capillary refill takes less than 2 seconds.   Neurological:      Comments: Sedated            Last Recorded Vitals  Blood pressure 118/74, pulse 95, temperature 36.3 °C (97.3 °F), temperature source Temporal, resp. rate 18, height 1.702 m (5' 7\"), weight 86.6 kg (190 lb 14.7 oz), SpO2 90 %.    Relevant Results  Results for orders placed or performed during the hospital encounter of 11/20/23 (from the past 24 hour(s))   Light Blue Top   Result Value Ref Range    Extra Tube Hold for add-ons.    Lavender Top   Result Value Ref Range    Extra Tube Hold for add-ons.    Basic Metabolic Panel   Result Value Ref Range    Glucose 76 74 - 99 mg/dL    Sodium 141 136 - 145 mmol/L    Potassium 4.4 3.5 - 5.3 mmol/L    Chloride 102 98 - 107 mmol/L    Bicarbonate 27 21 - 32 mmol/L    Anion Gap 16 10 - 20 mmol/L    Urea Nitrogen 10 6 - 23 mg/dL    Creatinine 0.81 0.50 - 1.30 mg/dL    eGFR >90 >60 mL/min/1.73m*2    Calcium 9.4 8.6 - 10.6 mg/dL   CBC   Result Value Ref Range    WBC 11.5 (H) 4.4 - 11.3 x10*3/uL    nRBC 0.0 0.0 - 0.0 /100 WBCs    RBC 5.03 4.50 - 5.90 x10*6/uL    Hemoglobin 15.5 13.5 - 17.5 g/dL    Hematocrit 47.8 41.0 - 52.0 %    MCV 95 80 - 100 fL    MCH 30.8 26.0 - 34.0 pg    MCHC 32.4 32.0 - 36.0 g/dL    RDW 13.0 11.5 - 14.5 %    Platelets 157 150 - 450 x10*3/uL   Magnesium   Result Value Ref Range    Magnesium 2.32 1.60 - 2.40 mg/dL   Renal function panel   Result Value Ref Range    Glucose 80 74 - 99 mg/dL    Sodium 142 136 - 145 mmol/L    Potassium 4.2 3.5 - 5.3 mmol/L    Chloride 103 98 - 107 mmol/L    Bicarbonate 27 21 - 32 mmol/L    Anion Gap 16 10 - 20 mmol/L    Urea Nitrogen 11 6 - 23 mg/dL    Creatinine 0.69 0.50 - 1.30 mg/dL    eGFR >90 >60 mL/min/1.73m*2    " Calcium 9.2 8.6 - 10.6 mg/dL    Phosphorus 3.2 2.5 - 4.9 mg/dL    Albumin 4.2 3.4 - 5.0 g/dL   Prepare RBC: 4 Units   Result Value Ref Range    PRODUCT CODE Q5864N20     Unit Number [id number]     Unit ABO A     Unit RH POS     XM INTEP COMP     Dispense Status IS     Blood Expiration Date December 11, 2023 23:59 EST     PRODUCT BLOOD TYPE 6200     UNIT VOLUME 350     PRODUCT CODE U0303C30     Unit Number [id number]     Unit ABO A     Unit RH POS     XM INTEP COMP     Dispense Status IS     Blood Expiration Date December 15, 2023 23:59 EST     PRODUCT BLOOD TYPE 6200     UNIT VOLUME 350    Type and screen   Result Value Ref Range    ABO TYPE A     Rh TYPE POS     ANTIBODY SCREEN NEG      XR chest 1 view    Result Date: 11/22/2023  Interpreted By:  José Miguel Stallworth and Liller Gregory STUDY: XR CHEST 1 VIEW;  11/22/2023 6:34 am   INDICATION: Signs/Symptoms:Hoarseness and difficult intubation.   COMPARISON: 11/21/2023   ACCESSION NUMBER(S): [id number]   ORDERING CLINICIAN: CHACHA STRICKLAND   FINDINGS: AP radiograph of the chest was provided.   CARDIOMEDIASTINAL SILHOUETTE: Cardiomediastinal silhouette is stable in size and configuration. Aortic arch calcifications are seen.   LUNGS: Coarsened interstitial infiltrates are noted bilaterally, unchanged compared to prior. Stable central increased pulmonary vascularity with mild interstitial prominence. No pneumothorax or effusion.   ABDOMEN: No remarkable upper abdominal findings.   BONES: No acute osseous changes.       Stable appearance of the chest when compared to prior radiograph.   I personally reviewed the images/study and I agree with the findings as stated above by resident physician, Dr. Sloan Gunter. The study was interpreted at [facility] in [facility].   Signed by: José Miguel Stallworth 11/22/2023 9:39 AM Dictation workstation:   [id number]    XR chest 1 view    Result Date: 11/22/2023  Interpreted By:   José Miguel Stallworth and Kamau Nyokabi STUDY: XR CHEST 1 VIEW;  11/21/2023 3:21 am   INDICATION: Signs/Symptoms:Daily SICU.   COMPARISON: chest x-ray 09/20/2023; chest CT 08/04/2023   ACCESSION NUMBER(S): [id number]   ORDERING CLINICIAN: AL LACEY   FINDINGS: AP portable view of the chest.   CARDIOMEDIASTINAL SILHOUETTE: Cardiomediastinal silhouette is normal in size and configuration.   LUNGS: Redemonstration of chronic bilateral coarse interstitial lung markings. There is no pneumothorax, focal consolidation, or pleural effusion.   ABDOMEN: No remarkable upper abdominal findings.   BONES: No acute osseous changes.       1.  Chronic bilateral coarse interstitial lung markings consistent with patient's known chronic lung disease, better assessed on prior CT 08/04/2023. Otherwise, no evidence of acute cardiopulmonary process.     I personally reviewed the images/study and I agree with Dr. Kavin Berg findings as stated. This study was interpreted at [place], [place]   MACRO: None   Signed by: José Miguel Stallworth 11/22/2023 8:49 AM Dictation workstation:   [id number]    ECG 12 lead    Result Date: 11/21/2023  Normal sinus rhythm ST & T wave abnormality, consider anterior ischemia Abnormal ECG When compared with ECG of 22-SEP-2023 12:23, Inverted T waves have replaced nonspecific T wave abnormality in Anterior leads Confirmed by Ramesh Ballard ([id number]) on 11/21/2023 11:01:44 AM       Assessment/Plan   Principal Problem:    Symptomatic stenosis of left carotid artery      Neuro: Sedated on propofol  -On propofol while intubated  -Tylenol for pain control, dilaudid IV if needed  -Hold home escitalopram     CV: History of HTN, HLD, and carotid artery stenosis. Recent coronary catheterization performed 9/2023 demonstrates 2-vessel disease. Hx of severe left carotid artery stenosis s/p carotid endarectomy by Dr Morgan on 11/20/2023. Now s/p L neck hematoma evacuation and washout    -Monitor hemodynamics with arterial line, Goals: MAP > 65, -160, DBP < 100  -Consider pain control first if HTN  -Monitor for HTN secondary to disruption of cerebral autoregulation and treat first with labetalol PRN  -Monitor for surgical site hematoma and ULISES drainage for increase in volume or change in drainage color  -Holding home ASA, plavix, and atorvastatin while NPO  -Holding home bisoprolol, HTCZ, losartan     Pulmonary: History idiopathic pulmonary fibrosis and is being evaluated for a lung transplant. Per chart review patient on 4L NC with activity at home, 2L with rest PRN, no O2 at night. Remains intubated  -To remain intubated overnight, will reconsider extubation in AM  -hold home Pirfenidone for now (treatment of idiopathic pulmonary fibrosis)      GI: Abdomen soft and nontender.  -NPO  -GI prophylaxis with PPI     : Baseline creatinine 0.9.   -Maintain lopes catheter, monitor I/O's, can DC in am or at midnight.   -Monitor renal function, will check BMP  -Monitor and optimize electrolytes, keep potassium >4.0, magnesium > 2.0  -MIV fluids LR at 100 ml/hr, while NPO     Heme: Baseline HGB 18-19.   -Monitor HH - will check CBC, coags now and again in the a.m.     VTE prophylaxis:   -SCDs     Endo: No history of diabetes or other endocrine disorder.  -Monitor blood glucose     ID: Afebrile.  -Antibiotics: Postop cefazolin × 24hrs  -Will monitor for fever, WBCs, and incision site for signs of infection.    Disposition: Admit to the intensive care unit. Critical care will follow. Discussed with Dr. Canales.     Jhony Hines MD

## 2023-11-22 NOTE — ANESTHESIA PREPROCEDURE EVALUATION
Patient: David Block    Procedure Information       Anesthesia Start Date/Time: 11/22/23 1315    Procedure: Endarterectomy Carotid Artery (Left)    Location: [facility] OR 16 / [facility] OR    Surgeons: Erickson Morgan MD            Relevant Problems   Anesthesia (within normal limits)      Cardiovascular   (+) Coronary artery disease   (+) HTN (hypertension)   (+) Hypercholesterolemia   (+) Other secondary pulmonary hypertension (CMS/HCC)   (+) Symptomatic stenosis of left carotid artery      GI   (+) Esophageal reflux      Neuro/Psych   (+) Symptomatic stenosis of left carotid artery      Pulmonary   (+) Other secondary pulmonary hypertension (CMS/HCC)      Musculoskeletal   (+) Osteoarthritis of knee       Clinical information reviewed:   Tobacco  Allergies  Meds   Med Hx  Surg Hx   Fam Hx  Soc Hx        NPO Detail:  No data recorded     Physical Exam    Airway  Mallampati: IV  Neck ROM: limited     Cardiovascular    Dental    Pulmonary    Abdominal      Other findings: Large left neck hematoma, patient on 6L NC on arrival to OR.           Anesthesia Plan    ASA 3 - emergent     general   (Plan for emergent neck hematoma evacuation with Vascular Surgery. ENT present. Plan for awake fiberoptic intubation for airway concerns. Emergency medications and airway equipment ready in room. Patient agrees to awake fiberoptic intubation. )  Anesthetic plan and risks discussed with patient.  Use of blood products discussed with patient who consented to blood products.

## 2023-11-22 NOTE — PROCEDURES
Speech-Language Pathology    SLP Adult FEES Evaluation  Patient Name: David Block  MRN: [id number]  Today's Date: 11/22/2023           Recommendations:   Recommendations: ENT Evaluation  Solid Consistency: Pureed/extremely thick (IDDSI Level 4)  Liquid Consistency: Nectar thick/mildly thick (IDDS Level 2)  Liquid Administration Via: Cup, Straw  Medication Administration: Pills crushed in pureed (verify with physician)  Supervision: Independent  Compensatory Strategy Recommendations: Small sips, Alternating liquids and solids, Hard/effortful swallow, Small bites, Crush meds, Other (Comment) (Volitional throat clear or cough every 2-3 bites/sips)  Postural Changes and/or Swallow Maneuvers:  (Head turn left/right or chin tuck ineffective)      Assessment/Impression:  Moderate pharyngeal dysphagia with reduced swallow safety and efficiency.       Prognosis:  Prognosis for Safe Diet Advancement: Excellent  Barriers/Prognosis Comment: post op edema      Plan:  Inpatient/Swing Bed or Outpatient: Inpatient  SLP Frequency: 1x per week  SLP Discharge Recommendations: Home independent  Solid Consistency: Pureed/extremely thick (IDDSI Level 4)  Liquid Consistency: Nectar thick/mildly thick (IDDS Level 2)  Next Treatment Priority: Repeat FEES in 3-5 days  Discussed POC: Patient, Nursing, Physician  Discussed Risks/Benefits: Yes  Patient/Caregiver Agreeable: Yes  SLP - OK to Discharge: Yes      Subjective : Pt alert and cooperative.    Oral/Motor Assessment:   Prominent edema of the left side of neck @ surgical site with extension into face and UE.       FEES:  FEES Verbal Consent: Fiberoptic endoscopic evaluation of the swallow exam was completed once informed verbal consent was obtained - Yes  Scope Passed: Through left nare  Patient Tolerated Procedure: Well  FEES Oral Phase  Volitional Hold With Thin Liquid Bolus:: Yes, intact  Anterior Oral Spillage (Lip Seal):: No  Oral Residues After The Swallow:: No  FEES Anatomy:  Structural Appearances  Nasal Passage: Mild erythema thorugout the nsal passage with edematous medial turbinate.  Velopharryngeal Port Closure: Complete  Palate: WFL  Valleculae: WFL  Arytenoids: Significant edema and ecchymosis of the left arytenoid with prolaped appearance.  Base of Tongue: WFL  Epiglottis: WFL  Pyriform Sinuses: Prominent ecchymosis of the left pyriform with obliteration of pocket from prolaped and edematous left arytenoid.  Vocal Fold Appearance: Erythema (Only able to visualize the right vocal fold  due to prolapsed and edematous arytenoid.)   Laryngeal Function:  Abduction: Full  Arytenoid Movement: Asymmetrical  Vocal Quality: Harsh  Secretion Management  Secretion Management: Valleculae  Amounts/Color/Texture: Minimal, White, Frothy  Patient Response to Secretions: Reflexive throat clear- reflexive cough, Effective to clear (inconsistent productive throat clear)  Aspiration of Secretions: Silent    Consistencies trialed: Thin liquid via tsp and straw, nectar (mildly) thick liquids via straw, honey (moderately) thick liquids via straw, puree via tsp, and regular solids.    Oral Phase:  Oral Phase: Within Functional Limits        Pharyngeal Phase:  Pharyngeal Phase: Impaired  Pharyngeal Phase  Reduced Epiglottic Inversion: All Consistencies Trialed  Reduced Laryngeal Vestibule Closure: All Consistencies Trialed  Reduced Pharyngeal Stripping Wave: All Consistencies Trialed       [place]:  Consistencies/Score: Thin: 8 - Material enters airway, passes below cords, no effort to eject (no cough)  Consistencies/Score: Nectar Liquid: 2 - Material enters airway, remains above cords, ejected from airway  Consistencies/Score: Pudding/Puree: 1 - Material does not enter airway      Inpatient Education:  Pt educated on result and recommendations. Pt agreeable.       Goals:   Patient will tolerate the least restrictive diet without overt s/s of oropharyngeal dysphagia 100% of the time.

## 2023-11-22 NOTE — OP NOTE
Flexible Bronchoscopy Operative Note     Date: 2023 - 2023  OR Location: [facility] OR    Name: David Block : 1950, Age: 73 y.o., MRN: [id number], Sex: male    Diagnosis  Pre-op Diagnosis     * Post-operative neck hematoma     * Laryngeal edema     * Acute on chronic respiratory failure Post-op Diagnosis     * Post-operative neck hematoma     * Laryngeal edema     * Acute on chronic respiratory failure     Procedures  Bronchoscopy, flexible, with or without fluoroscopic guidance, diagnostic [CPT 93408]    Surgeons   Panel 1    * Erickson Morgan MD - Primary  Panel 2    * Casandra Dawn MD - Primary    Resident/Fellow/Other Assistant:  Surgeon(s) and Role:  Panel 1     * Abbey Bullock MD - Assisting     * Nikolas Canales MD - Fellow  Panel 2     None    Procedure Summary  Anesthesia: General  ASA: ASA status not filed in the log.  Anesthesia Staff: Anesthesiologist: Dorothy Peralta DO  CRNA: ELIOT Nicole-CRNA  C-AA: EDNA Meléndez  Estimated Blood Loss: None for my portion of the procedure  Intra-op Medications:   Medication Name Total Dose   heparin (porcine) 5,000 Units in sodium chloride 0.9% 100 mL irrigation 5,000 Units   heparin (porcine) injection 5,000 Units Cannot be calculated   magnesium sulfate IV 4 g Cannot be calculated              Anesthesia Record               Intraprocedure I/O Totals          Intake    Dexmedetomidine 0.00 mL    The total shown is the total volume documented since Anesthesia Start was filed.    Propofol Drip 0.00 mL    The total shown is the total volume documented since Anesthesia Start was filed.    Total Intake 0 mL          Specimen: No specimens collected     Staff:   Circulator: Kristian Ordaz RN; Juan Carlos Peterson RN; Ирина Jacobo, RUI  Scrub Person: Iliana Castano; Chary Ordaz    Findings: Violaceous edema of left arytenoid, pyriform sinus, and aryepiglottic fold hooding over left TVC. Initial transoral fiberoptic intubation attempt by  anesthesia visualized the airway, but ETT ended supraglottic. Airway ultimately secured with Glidescope; flexible bronchoscopy completed to confirm appropriate placement within the airway     Indications: David Block is an 73 y.o. male who had a carotid endarterectomy on 11/20, and developed a gradually expanding left neck hematoma. On 11/22, the patient began to have more difficulty swallowing, associated with voice changes. Flexible laryngoscopy demonstrated left hypopharyngeal/laryngeal edema from vascular congestion. The decision was made to proceed urgently to the OR for hematoma evacuation by the Vascular Surgery team, with ENT available for airway securement at the start of the procedure. All risks and benefits were discussed, and consent was obtained.    Procedure Details:   After consent was obtained, the patient was brought back to the OR. A huddle was performed. Anesthesia assessed the patient, and all were in agreement that an awake transoral fiberoptic intubation would be best for safe securement of the patient's airway. Anesthesia topically anesthetized the upper airway and then proceeded with fiberoptic intubation. The flexible bronchoscope was passed through the glottic inlet and advanced to the level of the wilfredo. They attempted to advance the tube over the bronchoscope, Seldinger style, but upon removal of the bronchoscope, there was no return of end-tidal CO2, and the ETT was noted to be supraglottic. Their team then obtained a good view of the larynx with a Glidescope and placed an ETT without difficulty. The ETT was connected to the ventilator, with confirmation of return of end-tidal CO2.     After the patient was adequately pre-oxygenated, an adaptor was placed allowing for simultaneous ventilation and bronchoscopy. The flexible bronchoscope was then passed through the adaptor and through the endotracheal tube to examine the trachea down to the distal bronchi. The end of the ETT was  visualized. There was moderate secretion burden that was suctioned away. There was some bronchomalacia of the left mainstem bronchus, but the distal bronchi were otherwise visualized and patent, and the tip of the ETT was confirmed to be above the wilfredo. No other mass or lesion was visualized. The bronchoscope was then removed, and the ETT was secured in place by the Anesthesia team. The patient was returned to the care of the Anesthesia and Vascular Surgery teams. There were no complications for my portion of the procedure.    Complications:  None; patient tolerated the procedure well.    Disposition:  To Vascular Surgery team  Condition: stable

## 2023-11-23 ENCOUNTER — APPOINTMENT (OUTPATIENT)
Dept: RADIOLOGY | Facility: HOSPITAL | Age: 73
DRG: 038 | End: 2023-11-23
Payer: MEDICARE

## 2023-11-23 LAB
ALBUMIN SERPL BCP-MCNC: 3.7 G/DL (ref 3.4–5)
ANION GAP BLDA CALCULATED.4IONS-SCNC: 6 MMO/L (ref 10–25)
ANION GAP SERPL CALC-SCNC: 13 MMOL/L (ref 10–20)
APTT PPP: 27 SECONDS (ref 27–38)
BASE EXCESS BLDA CALC-SCNC: 2.9 MMOL/L (ref -2–3)
BODY TEMPERATURE: 37 DEGREES CELSIUS
BUN SERPL-MCNC: 14 MG/DL (ref 6–23)
CA-I BLD-SCNC: 1.13 MMOL/L (ref 1.1–1.33)
CA-I BLDA-SCNC: 1.18 MMOL/L (ref 1.1–1.33)
CALCIUM SERPL-MCNC: 8.8 MG/DL (ref 8.6–10.6)
CHLORIDE BLDA-SCNC: 103 MMOL/L (ref 98–107)
CHLORIDE SERPL-SCNC: 104 MMOL/L (ref 98–107)
CO2 SERPL-SCNC: 27 MMOL/L (ref 21–32)
CREAT SERPL-MCNC: 0.63 MG/DL (ref 0.5–1.3)
ERYTHROCYTE [DISTWIDTH] IN BLOOD BY AUTOMATED COUNT: 12.4 % (ref 11.5–14.5)
GFR SERPL CREATININE-BSD FRML MDRD: >90 ML/MIN/1.73M*2
GLUCOSE BLD MANUAL STRIP-MCNC: 135 MG/DL (ref 74–99)
GLUCOSE BLD MANUAL STRIP-MCNC: 141 MG/DL (ref 74–99)
GLUCOSE BLD MANUAL STRIP-MCNC: 141 MG/DL (ref 74–99)
GLUCOSE BLDA-MCNC: 153 MG/DL (ref 74–99)
GLUCOSE SERPL-MCNC: 136 MG/DL (ref 74–99)
HCO3 BLDA-SCNC: 27.9 MMOL/L (ref 22–26)
HCT VFR BLD AUTO: 44 % (ref 41–52)
HCT VFR BLD EST: 46 % (ref 41–52)
HGB BLD-MCNC: 14.6 G/DL (ref 13.5–17.5)
HGB BLDA-MCNC: 15.3 G/DL (ref 13.5–17.5)
INHALED O2 CONCENTRATION: 40 %
INR PPP: 1.2 (ref 0.9–1.1)
LACTATE BLDA-SCNC: 1.2 MMOL/L (ref 0.4–2)
MAGNESIUM SERPL-MCNC: 2.24 MG/DL (ref 1.6–2.4)
MCH RBC QN AUTO: 31.1 PG (ref 26–34)
MCHC RBC AUTO-ENTMCNC: 33.2 G/DL (ref 32–36)
MCV RBC AUTO: 94 FL (ref 80–100)
NRBC BLD-RTO: 0 /100 WBCS (ref 0–0)
OXYHGB MFR BLDA: 95.2 % (ref 94–98)
PCO2 BLDA: 43 MM HG (ref 38–42)
PH BLDA: 7.42 PH (ref 7.38–7.42)
PHOSPHATE SERPL-MCNC: 3.6 MG/DL (ref 2.5–4.9)
PLATELET # BLD AUTO: 164 X10*3/UL (ref 150–450)
PO2 BLDA: 94 MM HG (ref 85–95)
POTASSIUM BLDA-SCNC: 4.2 MMOL/L (ref 3.5–5.3)
POTASSIUM SERPL-SCNC: 4.3 MMOL/L (ref 3.5–5.3)
PROTHROMBIN TIME: 13.5 SECONDS (ref 9.8–12.8)
RBC # BLD AUTO: 4.69 X10*6/UL (ref 4.5–5.9)
SAO2 % BLDA: 99 % (ref 94–100)
SODIUM BLDA-SCNC: 133 MMOL/L (ref 136–145)
SODIUM SERPL-SCNC: 140 MMOL/L (ref 136–145)
WBC # BLD AUTO: 9.6 X10*3/UL (ref 4.4–11.3)

## 2023-11-23 PROCEDURE — 71045 X-RAY EXAM CHEST 1 VIEW: CPT | Performed by: STUDENT IN AN ORGANIZED HEALTH CARE EDUCATION/TRAINING PROGRAM

## 2023-11-23 PROCEDURE — 84132 ASSAY OF SERUM POTASSIUM: CPT

## 2023-11-23 PROCEDURE — 85027 COMPLETE CBC AUTOMATED: CPT

## 2023-11-23 PROCEDURE — 94003 VENT MGMT INPAT SUBQ DAY: CPT

## 2023-11-23 PROCEDURE — 71045 X-RAY EXAM CHEST 1 VIEW: CPT | Mod: FY

## 2023-11-23 PROCEDURE — 2500000001 HC RX 250 WO HCPCS SELF ADMINISTERED DRUGS (ALT 637 FOR MEDICARE OP)

## 2023-11-23 PROCEDURE — 37799 UNLISTED PX VASCULAR SURGERY: CPT

## 2023-11-23 PROCEDURE — 82805 BLOOD GASES W/O2 SATURATION: CPT

## 2023-11-23 PROCEDURE — 2500000004 HC RX 250 GENERAL PHARMACY W/ HCPCS (ALT 636 FOR OP/ED)

## 2023-11-23 PROCEDURE — 82947 ASSAY GLUCOSE BLOOD QUANT: CPT

## 2023-11-23 PROCEDURE — 85610 PROTHROMBIN TIME: CPT

## 2023-11-23 PROCEDURE — 2060000001 HC INTERMEDIATE ICU ROOM DAILY

## 2023-11-23 PROCEDURE — 96372 THER/PROPH/DIAG INJ SC/IM: CPT

## 2023-11-23 PROCEDURE — 83735 ASSAY OF MAGNESIUM: CPT

## 2023-11-23 PROCEDURE — 82330 ASSAY OF CALCIUM: CPT

## 2023-11-23 PROCEDURE — C9113 INJ PANTOPRAZOLE SODIUM, VIA: HCPCS

## 2023-11-23 PROCEDURE — 99291 CRITICAL CARE FIRST HOUR: CPT

## 2023-11-23 RX ORDER — HYDROMORPHONE HYDROCHLORIDE 1 MG/ML
0.2 INJECTION, SOLUTION INTRAMUSCULAR; INTRAVENOUS; SUBCUTANEOUS
Status: DISCONTINUED | OUTPATIENT
Start: 2023-11-23 | End: 2023-11-26 | Stop reason: HOSPADM

## 2023-11-23 RX ADMIN — HEPARIN SODIUM 5000 UNITS: 5000 INJECTION INTRAVENOUS; SUBCUTANEOUS at 23:13

## 2023-11-23 RX ADMIN — PANTOPRAZOLE SODIUM 40 MG: 40 INJECTION, POWDER, FOR SOLUTION INTRAVENOUS at 06:16

## 2023-11-23 RX ADMIN — HEPARIN SODIUM 5000 UNITS: 5000 INJECTION INTRAVENOUS; SUBCUTANEOUS at 17:32

## 2023-11-23 RX ADMIN — ATORVASTATIN CALCIUM 80 MG: 80 TABLET, FILM COATED ORAL at 20:45

## 2023-11-23 RX ADMIN — DEXAMETHASONE SODIUM PHOSPHATE 10 MG: 4 INJECTION, SOLUTION INTRAMUSCULAR; INTRAVENOUS at 13:20

## 2023-11-23 RX ADMIN — ACETAMINOPHEN 650 MG: 325 TABLET ORAL at 23:10

## 2023-11-23 RX ADMIN — PROPOFOL 30 MCG/KG/MIN: 10 INJECTION, EMULSION INTRAVENOUS at 02:47

## 2023-11-23 RX ADMIN — DEXAMETHASONE SODIUM PHOSPHATE 10 MG: 4 INJECTION, SOLUTION INTRAMUSCULAR; INTRAVENOUS at 06:16

## 2023-11-23 RX ADMIN — INSULIN LISPRO 1 UNITS: 100 INJECTION, SOLUTION INTRAVENOUS; SUBCUTANEOUS at 00:42

## 2023-11-23 RX ADMIN — CEFAZOLIN SODIUM 2 G: 2 INJECTION, SOLUTION INTRAVENOUS at 08:48

## 2023-11-23 RX ADMIN — PROPOFOL 30 MCG/KG/MIN: 10 INJECTION, EMULSION INTRAVENOUS at 08:47

## 2023-11-23 RX ADMIN — CEFAZOLIN SODIUM 2 G: 2 INJECTION, SOLUTION INTRAVENOUS at 00:45

## 2023-11-23 RX ADMIN — ACETAMINOPHEN 650 MG: 325 TABLET ORAL at 17:31

## 2023-11-23 ASSESSMENT — COGNITIVE AND FUNCTIONAL STATUS - GENERAL
STANDING UP FROM CHAIR USING ARMS: A LITTLE
CLIMB 3 TO 5 STEPS WITH RAILING: A LITTLE
WALKING IN HOSPITAL ROOM: A LITTLE
MOBILITY SCORE: 21
STANDING UP FROM CHAIR USING ARMS: A LITTLE
WALKING IN HOSPITAL ROOM: A LITTLE
DAILY ACTIVITIY SCORE: 24
MOBILITY SCORE: 21
CLIMB 3 TO 5 STEPS WITH RAILING: A LITTLE
DAILY ACTIVITIY SCORE: 24

## 2023-11-23 ASSESSMENT — PAIN - FUNCTIONAL ASSESSMENT
PAIN_FUNCTIONAL_ASSESSMENT: 0-10
PAIN_FUNCTIONAL_ASSESSMENT: CPOT (CRITICAL CARE PAIN OBSERVATION TOOL)
PAIN_FUNCTIONAL_ASSESSMENT: 0-10
PAIN_FUNCTIONAL_ASSESSMENT: 0-10
PAIN_FUNCTIONAL_ASSESSMENT: CPOT (CRITICAL CARE PAIN OBSERVATION TOOL)

## 2023-11-23 ASSESSMENT — PAIN SCALES - GENERAL
PAINLEVEL_OUTOF10: 0 - NO PAIN

## 2023-11-23 NOTE — CONSULTS
ConsultsENT DEPARTMENT CONSULTATION NOTE  Name: David Block  MRN: [id number]  : 1950  Consulting Team: Erickson Morgan MD  Reason for Consult: neck hematoma, dysphagia    History of Present Illness  The patient is a 73 y.o. male with severe L carotid artery stenosis and idiopathic pulmonary fibrosis on home 4L oxygen who presented to [facility] on 2023 for left carotid endarterectomy.     ENT was consulted for neck hematoma, dysphagia, and possible arytenoids swelling.  Patient endorses dysphagia to solids, however, denies shortness of breath.  Denies any fever, chills, weakness, change in vision, headache.  He thinks that the swelling is getting bigger.  Of note, patient was evaluated by SLP for dysphagia this morning and finding was concerning for possible arytenoid swelling.  Patient uses oxygen at home for idiopathic pulmonary fibrosis and is O2 oxygen requirements are stable (currently on 5L O2 saturating 83-85%).    Review of Systems  14 point review of systems completed and all negative except as noted in HPI.    Past Medical History  Past Medical History:   Diagnosis Date    BPH (benign prostatic hyperplasia)     Cancer of cheek (CMS/HCC)     Hyperlipidemia     Hypertension     Idiopathic pulmonary fibrosis (CMS/HCC)        Past Surgical History  Past Surgical History:   Procedure Laterality Date    BACK SURGERY      COLONOSCOPY      CT ANGIO NECK  2023    CT ANGIO NECK 2023 [facility] CT    CYSTOSCOPY      KNEE SURGERY         Allergies  Allergies   Allergen Reactions    Nifedipine Other     edema    Other Reaction(s): Edema       Medications    Current Facility-Administered Medications:     acetaminophen (Tylenol) tablet 650 mg, 650 mg, oral, q6h, Ada José APRN-CNP, 650 mg at 23 1044    [Held by provider] aspirin chewable tablet 81 mg, 81 mg, oral, Daily, Ada STANLEY José, APRN-CNP, 81 mg at 23 1035    [MAR Hold] atorvastatin (Lipitor) tablet 80 mg, 80 mg, oral, Nightly, Ada José,  APRN-CNP    [MAR Hold] benzocaine-menthol (Cepastat Sore Throat) 15-3.6 mg lozenge 1 lozenge, 1 lozenge, Mouth/Throat, q2h PRN, PATEL Aguero    [Held by provider] bisoprolol (Zebeta) tablet 2.5 mg, 2.5 mg, oral, Daily, PATEL Aguero    calcium gluconate in NS IV 1 g, 1 g, intravenous, q6h PRN, Jhony Hines MD, Stopped at 11/22/23 1818    calcium gluconate in NS IV 2 g, 2 g, intravenous, q6h PRN, Jhony Hines MD    ceFAZolin in dextrose (iso-os) (Ancef) IVPB 2 g, 2 g, intravenous, q8h, Jhony Hines MD, Stopped at 11/22/23 1855    cholecalciferol (Vitamin D-3) capsule 125 mcg, 5,000 Units, oral, Daily, PATEL Aguero    dexAMETHasone (Decadron) injection 10 mg, 10 mg, intravenous, q8h, Jhony Hines MD    dextrose 10 % in water (D10W) infusion, 0.3 g/kg/hr, intravenous, Once PRN, Jhony Hines MD    dextrose 50 % injection 25 g, 25 g, intravenous, q15 min PRN, Jhoyn Hines MD    [Held by provider] escitalopram (Lexapro) tablet 5 mg, 5 mg, oral, Daily, PATEL Aguero, 5 mg at 11/22/23 1043    glucagon (Glucagen) injection 1 mg, 1 mg, intramuscular, q15 min PRN, Jhony Hines MD    [Held by provider] heparin (porcine) injection 5,000 Units, 5,000 Units, subcutaneous, q8h, PATEL Aguero, 5,000 Units at 11/21/23 1713    [Held by provider] hydroCHLOROthiazide (HYDRODiuril) tablet 25 mg, 25 mg, oral, q AM, ELIOT Aguero-CNP, 25 mg at 11/22/23 1043    HYDROmorphone (Dilaudid) injection 0.2 mg, 0.2 mg, intravenous, q3h PRN, Jhony Hines MD    HYDROmorphone (Dilaudid) injection 0.5 mg, 0.5 mg, intravenous, q3h PRN, PATEL Aguero, 0.5 mg at 11/21/23 2059    insulin lispro (HumaLOG) injection 0-5 Units, 0-5 Units, subcutaneous, q4h, Jhony Hines MD    labetaloL (Normodyne,Trandate) injection 10 mg, 10 mg, intravenous, q4h PRN, PATEL Aguero    lactated Ringer's infusion, 100 mL/hr, intravenous,  Continuous, Jhony Hines MD, Last Rate: 100 mL/hr at 23 1600, 100 mL/hr at 23 1600    [Held by provider] losartan (Cozaar) tablet 25 mg, 25 mg, oral, Daily, Ada N José, APRN-CNP    magnesium sulfate IV 2 g, 2 g, intravenous, q6h PRN, Jhony Hines MD    magnesium sulfate IV 4 g, 4 g, intravenous, q6h PRN, Jhony Hines MD    oxyCODONE (Roxicodone) immediate release tablet 10 mg, 10 mg, oral, q6h PRN, Ada N José, APRN-CNP, 10 mg at 23 0742    oxyCODONE (Roxicodone) immediate release tablet 5 mg, 5 mg, oral, q6h PRN, Ada N José, APRN-CNP, 5 mg at 23 0412    oxygen (O2) therapy, , inhalation, Continuous PRN - O2/gases, Ada N José, APRN-CNP, 2 L/min at 23 0800    [START ON 2023] pantoprazole (ProtoNix) injection 40 mg, 40 mg, intravenous, Daily before breakfast, Jhony Hines MD    potassium chloride 20 mEq in 100 mL IV premix, 20 mEq, intravenous, q6h PRN, Jhony Hines MD    propofol (Diprivan) infusion, 5-50 mcg/kg/min, intravenous, Continuous, Jhony Hines MD, Last Rate: 20.8 mL/hr at 23 1836, 40 mcg/kg/min at 23 183    Family History  Family History   Problem Relation Name Age of Onset    Heart attack Father         Social History  Social History     Socioeconomic History    Marital status:      Spouse name: Not on file    Number of children: Not on file    Years of education: Not on file    Highest education level: Not on file   Occupational History    Not on file   Tobacco Use    Smoking status: Former     Types: Cigarettes     Quit date:      Years since quittin.8    Smokeless tobacco: Never   Substance and Sexual Activity    Alcohol use: Not on file    Drug use: Not on file    Sexual activity: Not on file   Other Topics Concern    Not on file   Social History Narrative    Not on file     Social Determinants of Health     Financial Resource Strain: Low Risk  (2023)    Overall Financial Resource  Strain (CARDIA)     Difficulty of Paying Living Expenses: Not hard at all   Food Insecurity: No Food Insecurity (11/21/2023)    Hunger Vital Sign     Worried About Running Out of Food in the Last Year: Never true     Ran Out of Food in the Last Year: Never true   Transportation Needs: No Transportation Needs (11/21/2023)    PRAPARE - Transportation     Lack of Transportation (Medical): No     Lack of Transportation (Non-Medical): No   Physical Activity: Sufficiently Active (11/21/2023)    Exercise Vital Sign     Days of Exercise per Week: 3 days     Minutes of Exercise per Session: 60 min   Stress: No Stress Concern Present (11/21/2023)    [employer] [place] [employer] - Occupational Stress Questionnaire     Feeling of Stress : Not at all   Social Connections: Moderately Isolated (11/21/2023)    Social Connection and Isolation Panel [NHANES]     Frequency of Communication with Friends and Family: Three times a week     Frequency of Social Gatherings with Friends and Family: Three times a week     Attends Synagogue Services: Never     Active Member of Clubs or Organizations: No     Attends Club or Organization Meetings: Never     Marital Status:    Intimate Partner Violence: Not At Risk (11/21/2023)    Humiliation, Afraid, Rape, and Kick questionnaire     Fear of Current or Ex-Partner: No     Emotionally Abused: No     Physically Abused: No     Sexually Abused: No   Housing Stability: Low Risk  (11/21/2023)    Housing Stability Vital Sign     Unable to Pay for Housing in the Last Year: No     Number of Places Lived in the Last Year: 1     Unstable Housing in the Last Year: No       Vital Signs  Vitals:    11/22/23 1830   BP:    Pulse: 62   Resp: 16   Temp:    SpO2: 97%       Physical Examination  GEN: The patient appears stated age in no acute distress  VOICE: No dysphonia, volume is appropriate, no pitch/voice breaks   RESP: Unlabored on room air with no audible stertor or stridor. currently on 5L  O2 saturating 83-85%  CV: Clinically well perfused   EYES: EOM grossly intact with no scleral icterus  NEURO: Alert and oriented with no focal deficits and CN II-XII symmetric and intact bilaterally  HEAD: Scalp is normocephalic and atraumatic  FACE: No abrasions or lacerations, no maxillary or mandibular stepoffs  SAL: No parotid or submandibular masses or tenderness to palpation and clear saliva expressed from ducts  EARS: Normal external ears  NOSE: External nose appears normal, no significant septal deviation, anterior rhinoscopy limited with no active bleeding or lesions  OC: Normal lips, normal buccal mucosa, normal alveolar ridge, normal floor of mouth, normal tongue, normal hard palate, normal retromolar trigone  OP: Tonsils 2+, on scope fullness of L hypopharyngeal lateral wall, partially obstructing view of glottis.   NECK: Large left neck hematoma with no drainage.    Laboratory and Data  Results for orders placed or performed during the hospital encounter of 11/20/23 (from the past 24 hour(s))   CBC   Result Value Ref Range    WBC 11.5 (H) 4.4 - 11.3 x10*3/uL    nRBC 0.0 0.0 - 0.0 /100 WBCs    RBC 5.03 4.50 - 5.90 x10*6/uL    Hemoglobin 15.5 13.5 - 17.5 g/dL    Hematocrit 47.8 41.0 - 52.0 %    MCV 95 80 - 100 fL    MCH 30.8 26.0 - 34.0 pg    MCHC 32.4 32.0 - 36.0 g/dL    RDW 13.0 11.5 - 14.5 %    Platelets 157 150 - 450 x10*3/uL   Magnesium   Result Value Ref Range    Magnesium 2.32 1.60 - 2.40 mg/dL   Renal function panel   Result Value Ref Range    Glucose 80 74 - 99 mg/dL    Sodium 142 136 - 145 mmol/L    Potassium 4.2 3.5 - 5.3 mmol/L    Chloride 103 98 - 107 mmol/L    Bicarbonate 27 21 - 32 mmol/L    Anion Gap 16 10 - 20 mmol/L    Urea Nitrogen 11 6 - 23 mg/dL    Creatinine 0.69 0.50 - 1.30 mg/dL    eGFR >90 >60 mL/min/1.73m*2    Calcium 9.2 8.6 - 10.6 mg/dL    Phosphorus 3.2 2.5 - 4.9 mg/dL    Albumin 4.2 3.4 - 5.0 g/dL   Prepare RBC: 4 Units   Result Value Ref Range    PRODUCT CODE W5847O46      Unit Number [id number]     Unit ABO A     Unit RH POS     XM INTEP COMP     Dispense Status XM     Blood Expiration Date December 11, 2023 23:59 EST     PRODUCT BLOOD TYPE 6200     UNIT VOLUME 350     PRODUCT CODE Y3359F45     Unit Number [id number]     Unit ABO A     Unit RH POS     XM INTEP COMP     Dispense Status XM     Blood Expiration Date December 15, 2023 23:59 EST     PRODUCT BLOOD TYPE 6200     UNIT VOLUME 350    Type and screen   Result Value Ref Range    ABO TYPE A     Rh TYPE POS     ANTIBODY SCREEN NEG    Blood Gas Arterial Full Panel Unsolicited   Result Value Ref Range    POCT pH, Arterial 7.34 (L) 7.38 - 7.42 pH    POCT pCO2, Arterial 46 (H) 38 - 42 mm Hg    POCT pO2, Arterial 102 (H) 85 - 95 mm Hg    POCT SO2, Arterial 99 94 - 100 %    POCT Oxy Hemoglobin, Arterial 95.1 94.0 - 98.0 %    POCT Hematocrit Calculated, Arterial 44.0 41.0 - 52.0 %    POCT Sodium, Arterial 135 (L) 136 - 145 mmol/L    POCT Potassium, Arterial 4.0 3.5 - 5.3 mmol/L    POCT Chloride, Arterial 104 98 - 107 mmol/L    POCT Ionized Calcium, Arterial 1.13 1.10 - 1.33 mmol/L    POCT Glucose, Arterial 99 74 - 99 mg/dL    POCT Lactate, Arterial 0.7 0.4 - 2.0 mmol/L    POCT Base Excess, Arterial -1.4 -2.0 - 3.0 mmol/L    POCT HCO3 Calculated, Arterial 24.8 22.0 - 26.0 mmol/L    POCT Hemoglobin, Arterial 14.7 13.5 - 17.5 g/dL    POCT Anion Gap, Arterial 10 10 - 25 mmo/L    Patient Temperature 37.0 degrees Celsius   Calcium, Ionized   Result Value Ref Range    POCT Calcium, Ionized 1.08 (L) 1.1 - 1.33 mmol/L   CBC   Result Value Ref Range    WBC 14.0 (H) 4.4 - 11.3 x10*3/uL    nRBC 0.0 0.0 - 0.0 /100 WBCs    RBC 4.54 4.50 - 5.90 x10*6/uL    Hemoglobin 14.1 13.5 - 17.5 g/dL    Hematocrit 42.8 41.0 - 52.0 %    MCV 94 80 - 100 fL    MCH 31.1 26.0 - 34.0 pg    MCHC 32.9 32.0 - 36.0 g/dL    RDW 12.9 11.5 - 14.5 %    Platelets 152 150 - 450 x10*3/uL   Coagulation Screen   Result Value Ref Range    Protime 13.9 (H) 9.8 - 12.8 seconds     INR 1.2 (H) 0.9 - 1.1    aPTT 28 27 - 38 seconds   Magnesium   Result Value Ref Range    Magnesium 2.25 1.60 - 2.40 mg/dL   Renal Function Panel   Result Value Ref Range    Glucose 91 74 - 99 mg/dL    Sodium 140 136 - 145 mmol/L    Potassium 4.1 3.5 - 5.3 mmol/L    Chloride 104 98 - 107 mmol/L    Bicarbonate 25 21 - 32 mmol/L    Anion Gap 15 10 - 20 mmol/L    Urea Nitrogen 11 6 - 23 mg/dL    Creatinine 0.67 0.50 - 1.30 mg/dL    eGFR >90 >60 mL/min/1.73m*2    Calcium 8.5 (L) 8.6 - 10.6 mg/dL    Phosphorus 3.4 2.5 - 4.9 mg/dL    Albumin 3.9 3.4 - 5.0 g/dL       Radiology Reviewed  none      PROCEDURE NOTE:  Nasopharyngolaryngoscopy    For better visualization because of neck swelling, a flexible fiberoptic nasopharyngolaryngoscopy was performed. Patient was correctly identified and verbal consent obtained.  After topical anesthesia with atomized 4% Lidocaine with Afrin, the flexible scope was introduced into the right naris.    The following areas were visualized:  Nasal septum, turbinates, nasopharynx, oropharynx, hypopharynx, soft palate, base of tongue, epiglottis    These structures were found to be normal with the following notable findings:     - fullness of L hypopharyngeal lateral wall, partially obstructing view of glottis.       Assessment  David Block is a 73 y.o. male with idiopathic pulmonary fibrosis (on home 4L oxygen) and severe L carotid artery stenosis s/p left carotid endarterectomy on 11/20/23.    ENT was consulted for neck hematoma, dysphagia, and possible arytenoids swelling. Scope showed fullness of L hypopharyngeal lateral wall, partially obstructing view of glottis. Given the scope findings and significant L neck hematoma, we recommend urgent surgical evacuation of the neck hematoma. Primary team, including Dr. Morgan is aware of this rec. ENT is available to assist with intubation if needed.  Please see anesthesia intubation notes for further updates on intubation  status.    Recommendations  -ecommend urgent surgical evacuation of the neck hematoma. Primary team, including Dr. Morgan is aware of this rec. ENT is available to assist with intubation if needed.    Seen and discussed with Dr. Dawn who agrees with assessment and plan.           Otolaryngology - Head & Neck Surgery  ENT Consult pager: [phone]  Peds pager: [phone]  Adult Head & Neck Phone: [phone]  ENT subspecialty team: Maykel individual resident who wrote today's note  Please page if urgent    I am the day consult resident. I can only be contacted from 6am to 5pm M-F. Page on weekends or off hours.

## 2023-11-23 NOTE — SIGNIFICANT EVENT
Post-Op Check    S:    L CEA c/b neck hematoma POD 0 from neck hematoma evacuation with intra-op ENT evaluation.  -Patient endorses pain being well controlled  -stated he was hot and wanted covers removed  -patient wanting ET tube out - reiterated patient would remain intubated until they were able to evaluate him in the morning    O:   Vital signs are stable, normotensive, afebrile, no new or worsening oxygen requirement, not tachycardic  Visit Vitals  /74 (BP Location: Left arm, Patient Position: Lying)   Pulse 68   Temp 36.4 °C (97.5 °F) (Temporal)   Resp 21      Physical Exam:   Constitutional: intubated, AOx3, communicating with clipboard  HEENT: no scleral icterus, L neck dressing CDI, swelling, no palpable hard hematoma  Cardiac: regular rate, normotensive   Pulmonary: intubated on CPAP  GI: Abdomen soft, non-tender, mildly distended  Extremities: Warm and well perfused; no peripheral edema present  Neuro: Aox3, CN II-XII grossly intact, moving all extremities spontaneously  Psych: appropriate mood and affect    A/P:   Overall, patient is doing well postoperatively with no acute concerns.  Will continue to optimize pain control as needed.  Will continue to monitor clinical exam, vitals, I&O's, and labs when available.  Will follow up on the patient in the a.m. or sooner as needed.  -HOB up  -Keep patient intubated overnight until evaluated by day team    Armand Roger MD  PGY1  General Surgery  Vascular Surgery [phone]

## 2023-11-23 NOTE — PROGRESS NOTES
"David Block is a 73 y.o. male on day 3 of admission presenting with Symptomatic stenosis of left carotid artery.     Subjective   Extubated in the AM, on home 4L with no issues. Mild hoarseness, but no difficulty breathing. No issues.        Objective     Physical Exam  GEN: The patient appears stated age in no acute distress  VOICE: No dysphonia, volume is appropriate, no pitch/voice breaks   RESP: Unlabored on room air with no audible stertor or stridor. currently on 4L O2 satting at 98%  CV: Clinically well perfused   EYES: EOM grossly intact with no scleral icterus  NEURO: Alert and oriented with no focal deficits and CN II-XII symmetric and intact bilaterally  HEAD: Scalp is normocephalic and atraumatic  FACE: No abrasions or lacerations, no maxillary or mandibular stepoffs  SAL: No parotid or submandibular masses or tenderness to palpation and clear saliva expressed from ducts  EARS: Normal external ears  NOSE: External nose appears normal, no significant septal deviation, anterior rhinoscopy limited with no active bleeding or lesions  OC: Normal lips, normal buccal mucosa, normal alveolar ridge, normal floor of mouth, normal tongue, normal hard palate, normal retromolar trigone  OP: Tonsils 2+, on scope no longer has fullness of of L hypopharyngeal lateral wall,   NECK: resolved neck hematoma, incision c/d/i    Last Recorded Vitals  Blood pressure 128/64, pulse 75, temperature 36.4 °C (97.5 °F), resp. rate 19, height 1.702 m (5' 7\"), weight 86.6 kg (190 lb 14.7 oz), SpO2 95 %.  Intake/Output last 3 Shifts:  I/O last 3 completed shifts:  In: 2124.5 (24.5 mL/kg) [P.O.:150; I.V.:874.5 (10.1 mL/kg); IV Piggyback:1100]  Out: 2400 (27.7 mL/kg) [Urine:2370 (0.8 mL/kg/hr); Drains:30]  Weight: 86.6 kg     Relevant Results        Scheduled medications  acetaminophen, 650 mg, oral, q6h  aspirin, 81 mg, oral, Daily  atorvastatin, 80 mg, oral, Nightly  [Held by provider] bisoprolol, 2.5 mg, oral, Daily  [Held by " provider] escitalopram, 5 mg, oral, Daily  heparin (porcine), 5,000 Units, subcutaneous, q8h  [Held by provider] hydroCHLOROthiazide, 25 mg, oral, q AM  [Held by provider] losartan, 25 mg, oral, Daily  pantoprazole, 40 mg, intravenous, Daily before breakfast      Continuous medications     PRN medications  PRN medications: benzocaine-menthol, HYDROmorphone, labetaloL, oxyCODONE, oxyCODONE, oxygen     Procedure Note: Flexible Nasolaryngoscopy  Verbal informed consent was obtained from the patient/patient's guardian. 4% lidocaine mixed with phenylephrine was prepared and dripped into the nose. It was placed in the right naris. Following an appropriate amount of time to allow for adequate anesthesia, a flexible fiberoptic nasolaryngoscope was placed into the patient's right naris. The nasal cavity, nasopharynx, oropharynx, hypopharynx, and all endolaryngeal structures were visualized and were normal except as listed below. Significant findings included:  -mild TVC irritation related to endotracheal tube  -no hypopharyngeal fullness on exam   -bilateral TVC mobile and symmetric      Assessment/Plan   Principal Problem:    Symptomatic stenosis of left carotid artery    73 y.o. male with idiopathic pulmonary fibrosis (on home 4L oxygen) and severe L carotid artery stenosis s/p left carotid endarterectomy on 11/20/23 complicated by a neck hematoma. Scope on 11/22 showed fullness of L hypopharyngeal lateral wall, partially obstructing view of glottis. Given the scope findings and significant L neck hematoma, we recommended urgent surgical evacuation of the neck hematoma. Extubated 11/23 AM and doing well.     - significant improvement on scope exam - no hypopharyngeal fullness  - ENT will sign off at this time  - No need for ENT follow up  - Please reach out with any questions or concerns.            Anders Iniguez MD

## 2023-11-23 NOTE — PROGRESS NOTES
"David Block is a 73 y.o. male on day 3 of admission presenting with Symptomatic stenosis of left carotid artery.    Subjective   Patient remained extubated overnight. Patient s/p evacuation of neck hematoma. Minimal swelling at this time. ULISES drain intact with minimal serosanguineous output.     Objective     Physical Exam  Constitutional: Lying in bed; not in acute distress  HEENT: no scleral icterus; L neck CDI, removed at bedside, small neck hematoma, stable and soft. Phonating appropriately. Tongue midline, smile symmetric  Cardiac: regular rate, normotensive   Pulmonary: saturating appropriately on supplemental O2  GI: Abdomen soft, non-tender, non- distended  Extremities: Warm and well perfused; no peripheral edema present; palpable radial pulse bilaterally  MSK: 5/5 strength bilateral UE/LE  Neuro: A&Ox3; no focal deficits  Psych: Appropriate mood and affect    Last Recorded Vitals  Blood pressure 118/74, pulse 80, temperature 36.7 °C (98.1 °F), temperature source Temporal, resp. rate 17, height 1.702 m (5' 7\"), weight 86.6 kg (190 lb 14.7 oz), SpO2 96 %.  Intake/Output last 3 Shifts:  I/O last 3 completed shifts:  In: 2124.5 (24.5 mL/kg) [P.O.:150; I.V.:874.5 (10.1 mL/kg); IV Piggyback:1100]  Out: 2400 (27.7 mL/kg) [Urine:2370 (0.8 mL/kg/hr); Drains:30]  Weight: 86.6 kg     Relevant Results  Results for orders placed or performed during the hospital encounter of 11/20/23 (from the past 24 hour(s))   CBC   Result Value Ref Range    WBC 11.5 (H) 4.4 - 11.3 x10*3/uL    nRBC 0.0 0.0 - 0.0 /100 WBCs    RBC 5.03 4.50 - 5.90 x10*6/uL    Hemoglobin 15.5 13.5 - 17.5 g/dL    Hematocrit 47.8 41.0 - 52.0 %    MCV 95 80 - 100 fL    MCH 30.8 26.0 - 34.0 pg    MCHC 32.4 32.0 - 36.0 g/dL    RDW 13.0 11.5 - 14.5 %    Platelets 157 150 - 450 x10*3/uL   Magnesium   Result Value Ref Range    Magnesium 2.32 1.60 - 2.40 mg/dL   Renal function panel   Result Value Ref Range    Glucose 80 74 - 99 mg/dL    Sodium 142 136 - 145 " mmol/L    Potassium 4.2 3.5 - 5.3 mmol/L    Chloride 103 98 - 107 mmol/L    Bicarbonate 27 21 - 32 mmol/L    Anion Gap 16 10 - 20 mmol/L    Urea Nitrogen 11 6 - 23 mg/dL    Creatinine 0.69 0.50 - 1.30 mg/dL    eGFR >90 >60 mL/min/1.73m*2    Calcium 9.2 8.6 - 10.6 mg/dL    Phosphorus 3.2 2.5 - 4.9 mg/dL    Albumin 4.2 3.4 - 5.0 g/dL   Prepare RBC: 4 Units   Result Value Ref Range    PRODUCT CODE G2923M77     Unit Number [id number]     Unit ABO A     Unit RH POS     XM INTEP COMP     Dispense Status XM     Blood Expiration Date December 11, 2023 23:59 EST     PRODUCT BLOOD TYPE 6200     UNIT VOLUME 350     PRODUCT CODE G2526T34     Unit Number [id number]     Unit ABO A     Unit RH POS     XM INTEP COMP     Dispense Status XM     Blood Expiration Date December 15, 2023 23:59 EST     PRODUCT BLOOD TYPE 6200     UNIT VOLUME 350    Type and screen   Result Value Ref Range    ABO TYPE A     Rh TYPE POS     ANTIBODY SCREEN NEG    Blood Gas Arterial Full Panel Unsolicited   Result Value Ref Range    POCT pH, Arterial 7.34 (L) 7.38 - 7.42 pH    POCT pCO2, Arterial 46 (H) 38 - 42 mm Hg    POCT pO2, Arterial 102 (H) 85 - 95 mm Hg    POCT SO2, Arterial 99 94 - 100 %    POCT Oxy Hemoglobin, Arterial 95.1 94.0 - 98.0 %    POCT Hematocrit Calculated, Arterial 44.0 41.0 - 52.0 %    POCT Sodium, Arterial 135 (L) 136 - 145 mmol/L    POCT Potassium, Arterial 4.0 3.5 - 5.3 mmol/L    POCT Chloride, Arterial 104 98 - 107 mmol/L    POCT Ionized Calcium, Arterial 1.13 1.10 - 1.33 mmol/L    POCT Glucose, Arterial 99 74 - 99 mg/dL    POCT Lactate, Arterial 0.7 0.4 - 2.0 mmol/L    POCT Base Excess, Arterial -1.4 -2.0 - 3.0 mmol/L    POCT HCO3 Calculated, Arterial 24.8 22.0 - 26.0 mmol/L    POCT Hemoglobin, Arterial 14.7 13.5 - 17.5 g/dL    POCT Anion Gap, Arterial 10 10 - 25 mmo/L    Patient Temperature 37.0 degrees Celsius   Calcium, Ionized   Result Value Ref Range    POCT Calcium, Ionized 1.08 (L) 1.1 - 1.33 mmol/L   CBC   Result  Value Ref Range    WBC 14.0 (H) 4.4 - 11.3 x10*3/uL    nRBC 0.0 0.0 - 0.0 /100 WBCs    RBC 4.54 4.50 - 5.90 x10*6/uL    Hemoglobin 14.1 13.5 - 17.5 g/dL    Hematocrit 42.8 41.0 - 52.0 %    MCV 94 80 - 100 fL    MCH 31.1 26.0 - 34.0 pg    MCHC 32.9 32.0 - 36.0 g/dL    RDW 12.9 11.5 - 14.5 %    Platelets 152 150 - 450 x10*3/uL   Coagulation Screen   Result Value Ref Range    Protime 13.9 (H) 9.8 - 12.8 seconds    INR 1.2 (H) 0.9 - 1.1    aPTT 28 27 - 38 seconds   Magnesium   Result Value Ref Range    Magnesium 2.25 1.60 - 2.40 mg/dL   Renal Function Panel   Result Value Ref Range    Glucose 91 74 - 99 mg/dL    Sodium 140 136 - 145 mmol/L    Potassium 4.1 3.5 - 5.3 mmol/L    Chloride 104 98 - 107 mmol/L    Bicarbonate 25 21 - 32 mmol/L    Anion Gap 15 10 - 20 mmol/L    Urea Nitrogen 11 6 - 23 mg/dL    Creatinine 0.67 0.50 - 1.30 mg/dL    eGFR >90 >60 mL/min/1.73m*2    Calcium 8.5 (L) 8.6 - 10.6 mg/dL    Phosphorus 3.4 2.5 - 4.9 mg/dL    Albumin 3.9 3.4 - 5.0 g/dL   POCT GLUCOSE   Result Value Ref Range    POCT Glucose 154 (H) 74 - 99 mg/dL   Renal Function Panel   Result Value Ref Range    Glucose 136 (H) 74 - 99 mg/dL    Sodium 140 136 - 145 mmol/L    Potassium 4.3 3.5 - 5.3 mmol/L    Chloride 104 98 - 107 mmol/L    Bicarbonate 27 21 - 32 mmol/L    Anion Gap 13 10 - 20 mmol/L    Urea Nitrogen 14 6 - 23 mg/dL    Creatinine 0.63 0.50 - 1.30 mg/dL    eGFR >90 >60 mL/min/1.73m*2    Calcium 8.8 8.6 - 10.6 mg/dL    Phosphorus 3.6 2.5 - 4.9 mg/dL    Albumin 3.7 3.4 - 5.0 g/dL   Magnesium   Result Value Ref Range    Magnesium 2.24 1.60 - 2.40 mg/dL   Coagulation Screen   Result Value Ref Range    Protime 13.5 (H) 9.8 - 12.8 seconds    INR 1.2 (H) 0.9 - 1.1    aPTT 27 27 - 38 seconds   CBC   Result Value Ref Range    WBC 9.6 4.4 - 11.3 x10*3/uL    nRBC 0.0 0.0 - 0.0 /100 WBCs    RBC 4.69 4.50 - 5.90 x10*6/uL    Hemoglobin 14.6 13.5 - 17.5 g/dL    Hematocrit 44.0 41.0 - 52.0 %    MCV 94 80 - 100 fL    MCH 31.1 26.0 - 34.0 pg     MCHC 33.2 32.0 - 36.0 g/dL    RDW 12.4 11.5 - 14.5 %    Platelets 164 150 - 450 x10*3/uL   Calcium, Ionized   Result Value Ref Range    POCT Calcium, Ionized 1.13 1.1 - 1.33 mmol/L   Blood Gas Arterial Full Panel   Result Value Ref Range    POCT pH, Arterial 7.42 7.38 - 7.42 pH    POCT pCO2, Arterial 43 (H) 38 - 42 mm Hg    POCT pO2, Arterial 94 85 - 95 mm Hg    POCT SO2, Arterial 99 94 - 100 %    POCT Oxy Hemoglobin, Arterial 95.2 94.0 - 98.0 %    POCT Hematocrit Calculated, Arterial 46.0 41.0 - 52.0 %    POCT Sodium, Arterial 133 (L) 136 - 145 mmol/L    POCT Potassium, Arterial 4.2 3.5 - 5.3 mmol/L    POCT Chloride, Arterial 103 98 - 107 mmol/L    POCT Ionized Calcium, Arterial 1.18 1.10 - 1.33 mmol/L    POCT Glucose, Arterial 153 (H) 74 - 99 mg/dL    POCT Lactate, Arterial 1.2 0.4 - 2.0 mmol/L    POCT Base Excess, Arterial 2.9 -2.0 - 3.0 mmol/L    POCT HCO3 Calculated, Arterial 27.9 (H) 22.0 - 26.0 mmol/L    POCT Hemoglobin, Arterial 15.3 13.5 - 17.5 g/dL    POCT Anion Gap, Arterial 6 (L) 10 - 25 mmo/L    Patient Temperature 37.0 degrees Celsius    FiO2 40 %   POCT GLUCOSE   Result Value Ref Range    POCT Glucose 141 (H) 74 - 99 mg/dL         Scheduled medications  acetaminophen, 650 mg, oral, q6h  [Held by provider] aspirin, 81 mg, oral, Daily  [MAR Hold] atorvastatin, 80 mg, oral, Nightly  [Held by provider] bisoprolol, 2.5 mg, oral, Daily  ceFAZolin, 2 g, intravenous, q8h  cholecalciferol, 5,000 Units, oral, Daily  dexAMETHasone, 10 mg, intravenous, q8h  [Held by provider] escitalopram, 5 mg, oral, Daily  [Held by provider] heparin (porcine), 5,000 Units, subcutaneous, q8h  [Held by provider] hydroCHLOROthiazide, 25 mg, oral, q AM  insulin lispro, 0-5 Units, subcutaneous, q4h  [Held by provider] losartan, 25 mg, oral, Daily  pantoprazole, 40 mg, intravenous, Daily before breakfast      Continuous medications  lactated Ringer's, 20 mL/hr, Last Rate: 20 mL/hr (11/23/23 0600)  propofol, 5-50 mcg/kg/min, Last  Rate: 40 mcg/kg/min (11/23/23 0625)    PRN medications  PRN medications: [MAR Hold] benzocaine-menthol, calcium gluconate, calcium gluconate, dextrose 10 % in water (D10W), dextrose, glucagon, HYDROmorphone, HYDROmorphone, labetaloL, magnesium sulfate, magnesium sulfate, oxyCODONE, oxyCODONE, oxygen, potassium chloride  This patient currently has cardiac telemetry ordered; if you would like to modify or discontinue the telemetry order, click here to go to the orders activity to modify/discontinue the order.     Assessment/Plan   David Block is a 73 year old male with PMH of idiopathic pulmonary fibrosis (being evaluated for lung transplant), and severe left carotid artery stenosis. He is s/p left carotid endarterectomy by Dr. Morgan on 11/20/23. Post-operative course notable for left neck hematoma, hoarseness, and frontal/orbital headache. No SOB, tightness of the skin, or focal deficits.    Patient POD 1 s/p evacuation of left neck hematoma. ENT consulted for arytenoid swelling. Extubation as per ENT. Neck with minimal swelling at this time.     Plan  Neuro: Acute post op pain,   -Continue pain regimen of Tylenol, Oxycodone and dilaudid for breakthrough pain  -Continue neuro checks q4h  -Maintain HOB >30 degrees at all times  -Magnesium 4 mg IV for headache  -Monitor for signs of worsening headache  -Continue home Lexapro    CV: Carotid artery stenosis, HTN, HLD, CAD with recent coronary heart cath (9/2023)  -Continue ASA  -Resume statin  -Blood pressure control    Pulm: Hx of idiopathic pulmonary fibrosis (on 4L NC at home with activity)  -IS q1h while awake  -Continue supplemental O2 as needed  -Continuous pulse ox  -Monitor for signs of respiratory compromise    FENGI: BPH, vitamin B deficiency  -Continue NPO  -I&Os  -RFP as clinically indicated  -Replete electrolytes to maintain K >4 and Mg >2  -Continue vitamin D    Endo:  -No issues    Heme: Left neck hematoma  -Daily CBC  -Monitor for enlarging hematoma      ID:  -Afebrile  -CBC as clinically indicated  -Perioperative Ancef completed    MSK: Delma OA  -PT eval    Ppx:  -SCDs    Dispo: continue care on nursing floor       Olive Sadler MD  General Surgery Pgy[facility]  DW attending Dr. Morgan

## 2023-11-23 NOTE — CARE PLAN
The patient's goals for the shift include  respiratory clearance and pain management.    The clinical goals for the shift include pain < 3, suction as necessary to remove sections and optimize respiratory status    Over the shift, the patient did not make progress toward the following goals. Barriers to progression include thick inline/oral secretions. Recommendations to address these barriers include frequent suctioning, HOB elevation, and promoting coughing/deep breathing.

## 2023-11-23 NOTE — PROGRESS NOTES
"David Block is a 73 y.o. male on day 3 of admission presenting with Symptomatic stenosis of left carotid artery.    Subjective   Patient's MIVF reduced overnight due to mild pulmonary edema noted on CXR. Did well on CPAP trial overnight, but was unable to stay on it due to trouble with secretions. On exam, was awake and intubated this AM. Requesting the tube be removed, endorses trouble with secretions.       Objective   Physical Exam  Constitutional:       Comments: Sedated   HENT:      Head: Normocephalic and atraumatic.      Mouth/Throat:      Comments: ET tube in place  Eyes:      Conjunctiva/sclera: Conjunctivae normal.   Cardiovascular:      Rate and Rhythm: Normal rate and regular rhythm.      Heart sounds: Normal heart sounds. No murmur heard.     No gallop.   Pulmonary:      Effort: Pulmonary effort is normal. No respiratory distress.      Breath sounds: Normal breath sounds. No stridor. No wheezing, rhonchi or rales.   Abdominal:      General: Abdomen is flat.      Palpations: Abdomen is soft.   Skin:     General: Skin is warm and dry.      Capillary Refill: Capillary refill takes less than 2 seconds.   Neurological:      Comments: Sedated       Last Recorded Vitals  Blood pressure 118/74, pulse 84, temperature 36.7 °C (98.1 °F), temperature source Temporal, resp. rate 16, height 1.702 m (5' 7\"), weight 86.6 kg (190 lb 14.7 oz), SpO2 94 %.  Intake/Output last 3 Shifts:  I/O last 3 completed shifts:  In: 2124.5 (24.5 mL/kg) [P.O.:150; I.V.:874.5 (10.1 mL/kg); IV Piggyback:1100]  Out: 2400 (27.7 mL/kg) [Urine:2370 (0.8 mL/kg/hr); Drains:30]  Weight: 86.6 kg     Relevant Results  Recent Results (from the past 24 hour(s))   Blood Gas Arterial Full Panel Unsolicited    Collection Time: 11/22/23  3:31 PM   Result Value Ref Range    POCT pH, Arterial 7.34 (L) 7.38 - 7.42 pH    POCT pCO2, Arterial 46 (H) 38 - 42 mm Hg    POCT pO2, Arterial 102 (H) 85 - 95 mm Hg    POCT SO2, Arterial 99 94 - 100 %    POCT Oxy " Hemoglobin, Arterial 95.1 94.0 - 98.0 %    POCT Hematocrit Calculated, Arterial 44.0 41.0 - 52.0 %    POCT Sodium, Arterial 135 (L) 136 - 145 mmol/L    POCT Potassium, Arterial 4.0 3.5 - 5.3 mmol/L    POCT Chloride, Arterial 104 98 - 107 mmol/L    POCT Ionized Calcium, Arterial 1.13 1.10 - 1.33 mmol/L    POCT Glucose, Arterial 99 74 - 99 mg/dL    POCT Lactate, Arterial 0.7 0.4 - 2.0 mmol/L    POCT Base Excess, Arterial -1.4 -2.0 - 3.0 mmol/L    POCT HCO3 Calculated, Arterial 24.8 22.0 - 26.0 mmol/L    POCT Hemoglobin, Arterial 14.7 13.5 - 17.5 g/dL    POCT Anion Gap, Arterial 10 10 - 25 mmo/L    Patient Temperature 37.0 degrees Celsius   Calcium, Ionized    Collection Time: 11/22/23  4:03 PM   Result Value Ref Range    POCT Calcium, Ionized 1.08 (L) 1.1 - 1.33 mmol/L   CBC    Collection Time: 11/22/23  4:03 PM   Result Value Ref Range    WBC 14.0 (H) 4.4 - 11.3 x10*3/uL    nRBC 0.0 0.0 - 0.0 /100 WBCs    RBC 4.54 4.50 - 5.90 x10*6/uL    Hemoglobin 14.1 13.5 - 17.5 g/dL    Hematocrit 42.8 41.0 - 52.0 %    MCV 94 80 - 100 fL    MCH 31.1 26.0 - 34.0 pg    MCHC 32.9 32.0 - 36.0 g/dL    RDW 12.9 11.5 - 14.5 %    Platelets 152 150 - 450 x10*3/uL   Coagulation Screen    Collection Time: 11/22/23  4:03 PM   Result Value Ref Range    Protime 13.9 (H) 9.8 - 12.8 seconds    INR 1.2 (H) 0.9 - 1.1    aPTT 28 27 - 38 seconds   Magnesium    Collection Time: 11/22/23  4:03 PM   Result Value Ref Range    Magnesium 2.25 1.60 - 2.40 mg/dL   Renal Function Panel    Collection Time: 11/22/23  4:03 PM   Result Value Ref Range    Glucose 91 74 - 99 mg/dL    Sodium 140 136 - 145 mmol/L    Potassium 4.1 3.5 - 5.3 mmol/L    Chloride 104 98 - 107 mmol/L    Bicarbonate 25 21 - 32 mmol/L    Anion Gap 15 10 - 20 mmol/L    Urea Nitrogen 11 6 - 23 mg/dL    Creatinine 0.67 0.50 - 1.30 mg/dL    eGFR >90 >60 mL/min/1.73m*2    Calcium 8.5 (L) 8.6 - 10.6 mg/dL    Phosphorus 3.4 2.5 - 4.9 mg/dL    Albumin 3.9 3.4 - 5.0 g/dL   POCT GLUCOSE    Collection  Time: 11/22/23  7:39 PM   Result Value Ref Range    POCT Glucose 154 (H) 74 - 99 mg/dL   Renal Function Panel    Collection Time: 11/23/23 12:04 AM   Result Value Ref Range    Glucose 136 (H) 74 - 99 mg/dL    Sodium 140 136 - 145 mmol/L    Potassium 4.3 3.5 - 5.3 mmol/L    Chloride 104 98 - 107 mmol/L    Bicarbonate 27 21 - 32 mmol/L    Anion Gap 13 10 - 20 mmol/L    Urea Nitrogen 14 6 - 23 mg/dL    Creatinine 0.63 0.50 - 1.30 mg/dL    eGFR >90 >60 mL/min/1.73m*2    Calcium 8.8 8.6 - 10.6 mg/dL    Phosphorus 3.6 2.5 - 4.9 mg/dL    Albumin 3.7 3.4 - 5.0 g/dL   Magnesium    Collection Time: 11/23/23 12:04 AM   Result Value Ref Range    Magnesium 2.24 1.60 - 2.40 mg/dL   Coagulation Screen    Collection Time: 11/23/23 12:04 AM   Result Value Ref Range    Protime 13.5 (H) 9.8 - 12.8 seconds    INR 1.2 (H) 0.9 - 1.1    aPTT 27 27 - 38 seconds   CBC    Collection Time: 11/23/23 12:04 AM   Result Value Ref Range    WBC 9.6 4.4 - 11.3 x10*3/uL    nRBC 0.0 0.0 - 0.0 /100 WBCs    RBC 4.69 4.50 - 5.90 x10*6/uL    Hemoglobin 14.6 13.5 - 17.5 g/dL    Hematocrit 44.0 41.0 - 52.0 %    MCV 94 80 - 100 fL    MCH 31.1 26.0 - 34.0 pg    MCHC 33.2 32.0 - 36.0 g/dL    RDW 12.4 11.5 - 14.5 %    Platelets 164 150 - 450 x10*3/uL   Calcium, Ionized    Collection Time: 11/23/23 12:04 AM   Result Value Ref Range    POCT Calcium, Ionized 1.13 1.1 - 1.33 mmol/L   Blood Gas Arterial Full Panel    Collection Time: 11/23/23 12:05 AM   Result Value Ref Range    POCT pH, Arterial 7.42 7.38 - 7.42 pH    POCT pCO2, Arterial 43 (H) 38 - 42 mm Hg    POCT pO2, Arterial 94 85 - 95 mm Hg    POCT SO2, Arterial 99 94 - 100 %    POCT Oxy Hemoglobin, Arterial 95.2 94.0 - 98.0 %    POCT Hematocrit Calculated, Arterial 46.0 41.0 - 52.0 %    POCT Sodium, Arterial 133 (L) 136 - 145 mmol/L    POCT Potassium, Arterial 4.2 3.5 - 5.3 mmol/L    POCT Chloride, Arterial 103 98 - 107 mmol/L    POCT Ionized Calcium, Arterial 1.18 1.10 - 1.33 mmol/L    POCT Glucose,  Arterial 153 (H) 74 - 99 mg/dL    POCT Lactate, Arterial 1.2 0.4 - 2.0 mmol/L    POCT Base Excess, Arterial 2.9 -2.0 - 3.0 mmol/L    POCT HCO3 Calculated, Arterial 27.9 (H) 22.0 - 26.0 mmol/L    POCT Hemoglobin, Arterial 15.3 13.5 - 17.5 g/dL    POCT Anion Gap, Arterial 6 (L) 10 - 25 mmo/L    Patient Temperature 37.0 degrees Celsius    FiO2 40 %   POCT GLUCOSE    Collection Time: 11/23/23  4:04 AM   Result Value Ref Range    POCT Glucose 141 (H) 74 - 99 mg/dL   POCT GLUCOSE    Collection Time: 11/23/23  7:52 AM   Result Value Ref Range    POCT Glucose 135 (H) 74 - 99 mg/dL   POCT GLUCOSE    Collection Time: 11/23/23 12:17 PM   Result Value Ref Range    POCT Glucose 141 (H) 74 - 99 mg/dL       Imaging  XR chest 1 view    Result Date: 11/23/2023  Interpreted By:  Justyn Ybarra, STUDY: XR CHEST 1 VIEW;  11/23/2023 3:20 am   INDICATION: Signs/Symptoms:am rounds.   COMPARISON: Chest radiograph dated 11/22/2023abdominopelvic CT scan 09/20/2023 and chest CT 08/04/2023   ACCESSION NUMBER(S): [id number]   ORDERING CLINICIAN: RACHEL DONALD   FINDINGS: AP radiograph of the chest. Patient is now rotated towards left. Endotracheal tube again terminates at the level of medial clavicles, approximately 7.1 cm superior to wilfredo; consider slight advancement   CARDIOMEDIASTINAL SILHOUETTE: The cardiomediastinal silhouette is within normal limits of size, allowing for AP projection and low lung volumes.. Aortic knob calcifications.   LUNGS: Overall improvement in diffuse interstitial prominence, likely representing decreasing pulmonary edema. Persistent background chronic lung changes. Left basilar lung aeration/atelectasis has improved from prior study. No sizable pleural effusions or pneumothorax.   ABDOMEN: No remarkable upper abdominal findings.   BONES: No acute osseous abnormality.       1. Interval improvement in bilateral lung aeration, likely due to decreasing edema and atelectasis. 2. Endotracheal tube again terminates at the  level of medial clavicles and consider slight advancement.   Signed by: Justyn Ybarra 11/23/2023 6:46 AM Dictation workstation:   [id number]    XR chest 1 view    Result Date: 11/23/2023  Interpreted By:  Justyn Ybarra, STUDY: XR CHEST 1 VIEW;  11/22/2023 5:17 pm   INDICATION: Signs/Symptoms:Post-op CXR.   COMPARISON: Chest radiograph dated 11/22/2023, abdominopelvic CT scan 09/20/2023 and chest CT 08/04/2023   ACCESSION NUMBER(S): [id number]   ORDERING CLINICIAN: MC CANTRELL   FINDINGS: AP radiograph of the chest. Patient is now rotated towards left. Interval intubation with endotracheal tube at the level of medial clavicles, approximately 6.8 cm superior to wilfredo; consider slight advancement   CARDIOMEDIASTINAL SILHOUETTE: The cardiomediastinal silhouette appears more prominent than prior, likely due to patient rotation. Aortic knob calcifications.   LUNGS: Similar coarsening of lung markings bilaterally, correlating with chronic changes and pulmonary fibrosis on CT scan 08/04/2023. Superimposed pulmonary edema is not excluded. There is now elevation of left hemidiaphragm with increased left basilar opacity, suggestive of worsening left basilar atelectasis. No sizable pleural effusion or pneumothorax.   ABDOMEN: No remarkable upper abdominal findings.   BONES: No acute osseous abnormality.       1. New elevation of left hemidiaphragm with increased left basilar opacity, suggestive of worsening of left basilar atelectasis. 2. Suggestion of mild pulmonary edema superimposed on chronic lung changes. Correlate with patient's volume status. 3. Endotracheal tube at the level of medial clavicles and consider slight advancement.   Signed by: Justyn Ybarra 11/23/2023 6:44 AM Dictation workstation:   [id number]    XR chest 1 view    Result Date: 11/22/2023  Interpreted By:  José Miguel Stallworth and Liller Gregory STUDY: XR CHEST 1 VIEW;  11/22/2023 6:34 am   INDICATION: Signs/Symptoms:Hoarseness and difficult intubation.    COMPARISON: 11/21/2023   ACCESSION NUMBER(S): [id number]   ORDERING CLINICIAN: CHACHA STRICKLAND   FINDINGS: AP radiograph of the chest was provided.   CARDIOMEDIASTINAL SILHOUETTE: Cardiomediastinal silhouette is stable in size and configuration. Aortic arch calcifications are seen.   LUNGS: Coarsened interstitial infiltrates are noted bilaterally, unchanged compared to prior. Stable central increased pulmonary vascularity with mild interstitial prominence. No pneumothorax or effusion.   ABDOMEN: No remarkable upper abdominal findings.   BONES: No acute osseous changes.       Stable appearance of the chest when compared to prior radiograph.   I personally reviewed the images/study and I agree with the findings as stated above by resident physician, Dr. Sloan Gunter. The study was interpreted at [facility] in [facility].   Signed by: José Miguel Stallworth 11/22/2023 9:39 AM Dictation workstation:   [id number]               Assessment/Plan   Principal Problem:    Symptomatic stenosis of left carotid artery    David Block is a 73 y.o. male presenting with a history of idiopathic pulmonary fibrosis and is being evaluated for a lung transplant. He was found to have severe left carotid artery stenosis on recent carotid duplex ultrasound with a PSV/EDV of 649/311 and a ratio of 9.7. Left carotid endarterectomy by Dr. Morgan on 11/20/2023. Since last admission to ICU, patient has been HDS. On FEES 11/22, there was significant edema and ecchymosis of the L arytenoid with prolapsed appearance Decision was made to go to OR. Arrives s/p left neck hematoma evacuation and washout. EBL 5ml, 50mL of clot removed. Arrives to SICU sedated and intubated. Neck at surgical site is soft.     Neuro: Was sedated on propofol on arrival to ICU  -Propofol off, patient was extubated this AM  -Tylenol for pain control, dilaudid IV if needed  -Hold home escitalopram     CV: History of HTN, HLD, and  carotid artery stenosis. Recent coronary catheterization performed 9/2023 demonstrates 2-vessel disease. Hx of severe left carotid artery stenosis s/p carotid endarectomy by Dr Morgan on 11/20/2023. Now s/p L neck hematoma evacuation and washout   -Monitor hemodynamics with arterial line, Goals: MAP > 65, -160, DBP < 100  -Consider pain control first if HTN  -Monitor for HTN secondary to disruption of cerebral autoregulation and treat first with labetalol PRN  -Monitor for surgical site hematoma and ULISES drainage for increase in volume or change in drainage color  -Holding home ASA, plavix, and atorvastatin while NPO  -Holding home bisoprolol, HTCZ, losartan     Pulmonary: History idiopathic pulmonary fibrosis and is being evaluated for a lung transplant. Per chart review patient on 4L NC with activity at home, 2L with rest PRN, no O2 at night. Extubated this AM.  -Extubated this AM  -hold home Pirfenidone for now (treatment of idiopathic pulmonary fibrosis)      GI: Abdomen soft and nontender.  -NPO  -GI prophylaxis with PPI     : Baseline creatinine 0.9.   -Maintain lopes catheter, monitor I/O's, can DC in am or at midnight.   -Monitor renal function, will check BMP  -Monitor and optimize electrolytes, keep potassium >4.0, magnesium > 2.0  -MIV fluids LR at 20 ml/hr, while NPO     Heme: Baseline HGB 18-19.   -Monitor HH, stable post-op     VTE prophylaxis:   -SCDs, SC heparin     Endo: No history of diabetes or other endocrine disorder.  -Monitor blood glucose     ID: Afebrile.  -Antibiotics: Postop cefazolin × 24hrs  -Will monitor for fever, WBCs, and incision site for signs of infection     Disposition: continue ICU care. Discussed with Dr. Canales.            Jhony Hines MD

## 2023-11-24 LAB
ALBUMIN SERPL BCP-MCNC: 4.1 G/DL (ref 3.4–5)
ANION GAP SERPL CALC-SCNC: 16 MMOL/L (ref 10–20)
BLOOD EXPIRATION DATE: NORMAL
BUN SERPL-MCNC: 20 MG/DL (ref 6–23)
CALCIUM SERPL-MCNC: 9.4 MG/DL (ref 8.6–10.6)
CHLORIDE SERPL-SCNC: 102 MMOL/L (ref 98–107)
CO2 SERPL-SCNC: 28 MMOL/L (ref 21–32)
CREAT SERPL-MCNC: 0.74 MG/DL (ref 0.5–1.3)
DISPENSE STATUS: NORMAL
ERYTHROCYTE [DISTWIDTH] IN BLOOD BY AUTOMATED COUNT: 13.1 % (ref 11.5–14.5)
GFR SERPL CREATININE-BSD FRML MDRD: >90 ML/MIN/1.73M*2
GLUCOSE SERPL-MCNC: 173 MG/DL (ref 74–99)
HCT VFR BLD AUTO: 47.8 % (ref 41–52)
HGB BLD-MCNC: 16 G/DL (ref 13.5–17.5)
MAGNESIUM SERPL-MCNC: 2.43 MG/DL (ref 1.6–2.4)
MCH RBC QN AUTO: 32.1 PG (ref 26–34)
MCHC RBC AUTO-ENTMCNC: 33.5 G/DL (ref 32–36)
MCV RBC AUTO: 96 FL (ref 80–100)
NRBC BLD-RTO: 0 /100 WBCS (ref 0–0)
PHOSPHATE SERPL-MCNC: 2.3 MG/DL (ref 2.5–4.9)
PLATELET # BLD AUTO: 207 X10*3/UL (ref 150–450)
POTASSIUM SERPL-SCNC: 4.2 MMOL/L (ref 3.5–5.3)
PRODUCT BLOOD TYPE: 6200
PRODUCT CODE: NORMAL
RBC # BLD AUTO: 4.99 X10*6/UL (ref 4.5–5.9)
SODIUM SERPL-SCNC: 142 MMOL/L (ref 136–145)
UNIT ABO: NORMAL
UNIT NUMBER: NORMAL
UNIT RH: NORMAL
UNIT VOLUME: 350
WBC # BLD AUTO: 18.3 X10*3/UL (ref 4.4–11.3)
XM INTEP: NORMAL

## 2023-11-24 PROCEDURE — 2500000001 HC RX 250 WO HCPCS SELF ADMINISTERED DRUGS (ALT 637 FOR MEDICARE OP)

## 2023-11-24 PROCEDURE — C9113 INJ PANTOPRAZOLE SODIUM, VIA: HCPCS

## 2023-11-24 PROCEDURE — 83735 ASSAY OF MAGNESIUM: CPT

## 2023-11-24 PROCEDURE — 85027 COMPLETE CBC AUTOMATED: CPT

## 2023-11-24 PROCEDURE — 2500000004 HC RX 250 GENERAL PHARMACY W/ HCPCS (ALT 636 FOR OP/ED)

## 2023-11-24 PROCEDURE — 97530 THERAPEUTIC ACTIVITIES: CPT | Mod: GP | Performed by: PHYSICAL THERAPIST

## 2023-11-24 PROCEDURE — 97161 PT EVAL LOW COMPLEX 20 MIN: CPT | Mod: GP | Performed by: PHYSICAL THERAPIST

## 2023-11-24 PROCEDURE — 96372 THER/PROPH/DIAG INJ SC/IM: CPT

## 2023-11-24 PROCEDURE — 36415 COLL VENOUS BLD VENIPUNCTURE: CPT

## 2023-11-24 PROCEDURE — 80069 RENAL FUNCTION PANEL: CPT

## 2023-11-24 PROCEDURE — 1200000002 HC GENERAL ROOM WITH TELEMETRY DAILY

## 2023-11-24 RX ADMIN — HEPARIN SODIUM 5000 UNITS: 5000 INJECTION INTRAVENOUS; SUBCUTANEOUS at 16:45

## 2023-11-24 RX ADMIN — ASPIRIN 81 MG CHEWABLE TABLET 81 MG: 81 TABLET CHEWABLE at 10:23

## 2023-11-24 RX ADMIN — ACETAMINOPHEN 650 MG: 325 TABLET ORAL at 16:34

## 2023-11-24 RX ADMIN — ATORVASTATIN CALCIUM 80 MG: 80 TABLET, FILM COATED ORAL at 21:25

## 2023-11-24 RX ADMIN — ACETAMINOPHEN 650 MG: 325 TABLET ORAL at 10:23

## 2023-11-24 RX ADMIN — PANTOPRAZOLE SODIUM 40 MG: 40 INJECTION, POWDER, FOR SOLUTION INTRAVENOUS at 06:15

## 2023-11-24 RX ADMIN — HEPARIN SODIUM 5000 UNITS: 5000 INJECTION INTRAVENOUS; SUBCUTANEOUS at 10:23

## 2023-11-24 RX ADMIN — ACETAMINOPHEN 650 MG: 325 TABLET ORAL at 21:26

## 2023-11-24 ASSESSMENT — COGNITIVE AND FUNCTIONAL STATUS - GENERAL
MOVING TO AND FROM BED TO CHAIR: A LITTLE
STANDING UP FROM CHAIR USING ARMS: A LITTLE
MOBILITY SCORE: 18
CLIMB 3 TO 5 STEPS WITH RAILING: A LITTLE
MOVING FROM LYING ON BACK TO SITTING ON SIDE OF FLAT BED WITH BEDRAILS: A LITTLE
TURNING FROM BACK TO SIDE WHILE IN FLAT BAD: A LITTLE
CLIMB 3 TO 5 STEPS WITH RAILING: A LITTLE
DAILY ACTIVITIY SCORE: 24
WALKING IN HOSPITAL ROOM: A LITTLE
MOBILITY SCORE: 21
DAILY ACTIVITIY SCORE: 24
STANDING UP FROM CHAIR USING ARMS: A LITTLE
WALKING IN HOSPITAL ROOM: A LITTLE
MOBILITY SCORE: 21
CLIMB 3 TO 5 STEPS WITH RAILING: A LITTLE
STANDING UP FROM CHAIR USING ARMS: A LITTLE
WALKING IN HOSPITAL ROOM: A LITTLE

## 2023-11-24 ASSESSMENT — PAIN SCALES - GENERAL
PAINLEVEL_OUTOF10: 0 - NO PAIN
PAINLEVEL_OUTOF10: 0 - NO PAIN

## 2023-11-24 ASSESSMENT — PAIN - FUNCTIONAL ASSESSMENT: PAIN_FUNCTIONAL_ASSESSMENT: 0-10

## 2023-11-24 ASSESSMENT — ACTIVITIES OF DAILY LIVING (ADL): ADL_ASSISTANCE: INDEPENDENT

## 2023-11-24 NOTE — PROGRESS NOTES
"David Block is a 73 y.o. male on day 4 of admission presenting with Symptomatic stenosis of left carotid artery.    Subjective   Patient remained extubated overnight. Patient s/p evacuation of neck hematoma. Minimal swelling at this time. ULISES drain intact with minimal serosanguineous output.     Objective     Physical Exam  Constitutional: Lying in bed; not in acute distress  HEENT: no scleral icterus; L neck with small hematoma, stable and soft. ULISES drain in place with serosanguinous output Phonating appropriately. Tongue midline, smile symmetric  Cardiac: regular rate, normotensive   Pulmonary: saturating appropriately on supplemental O2  GI: Abdomen soft, non-tender, non- distended  Extremities: Warm and well perfused; no peripheral edema present; palpable radial pulse bilaterally  MSK: 5/5 strength bilateral UE/LE  Neuro: A&Ox3; no focal deficits  Psych: Appropriate mood and affect    Last Recorded Vitals  Blood pressure 129/65, pulse 74, temperature 36.5 °C (97.7 °F), resp. rate 18, height 1.702 m (5' 7\"), weight 86.6 kg (190 lb 14.7 oz), SpO2 95 %.  Intake/Output last 3 Shifts:  I/O last 3 completed shifts:  In: 1510.7 (17.4 mL/kg) [P.O.:240; I.V.:1070.7 (12.4 mL/kg); IV Piggyback:200]  Out: 2285 (26.4 mL/kg) [Urine:2215 (0.7 mL/kg/hr); Drains:70]  Weight: 86.6 kg     Relevant Results  Results for orders placed or performed during the hospital encounter of 11/20/23 (from the past 96 hour(s))   Type And Screen   Result Value Ref Range    ABO TYPE A     Rh TYPE POS     ANTIBODY SCREEN NEG    Blood Gas Arterial Full Panel   Result Value Ref Range    POCT pH, Arterial 7.32 (L) 7.38 - 7.42 pH    POCT pCO2, Arterial 48 (H) 38 - 42 mm Hg    POCT pO2, Arterial 198 (H) 85 - 95 mm Hg    POCT SO2, Arterial 100 94 - 100 %    POCT Oxy Hemoglobin, Arterial 96.1 94.0 - 98.0 %    POCT Hematocrit Calculated, Arterial 50.0 41.0 - 52.0 %    POCT Sodium, Arterial 137 136 - 145 mmol/L    POCT Potassium, Arterial 4.0 3.5 - 5.3 " mmol/L    POCT Chloride, Arterial 107 98 - 107 mmol/L    POCT Ionized Calcium, Arterial 1.22 1.10 - 1.33 mmol/L    POCT Glucose, Arterial 105 (H) 74 - 99 mg/dL    POCT Lactate, Arterial 1.1 0.4 - 2.0 mmol/L    POCT Base Excess, Arterial -2.0 -2.0 - 3.0 mmol/L    POCT HCO3 Calculated, Arterial 24.7 22.0 - 26.0 mmol/L    POCT Hemoglobin, Arterial 16.8 13.5 - 17.5 g/dL    POCT Anion Gap, Arterial 9 (L) 10 - 25 mmo/L    Patient Temperature 37.0 degrees Celsius    FiO2 70 %   Blood Gas Arterial Full Panel   Result Value Ref Range    POCT pH, Arterial 7.24 (LL) 7.38 - 7.42 pH    POCT pCO2, Arterial 57 (H) 38 - 42 mm Hg    POCT pO2, Arterial 116 (H) 85 - 95 mm Hg    POCT SO2, Arterial 99 94 - 100 %    POCT Oxy Hemoglobin, Arterial 95.2 94.0 - 98.0 %    POCT Hematocrit Calculated, Arterial 53.0 (H) 41.0 - 52.0 %    POCT Sodium, Arterial 136 136 - 145 mmol/L    POCT Potassium, Arterial 4.2 3.5 - 5.3 mmol/L    POCT Chloride, Arterial 106 98 - 107 mmol/L    POCT Ionized Calcium, Arterial 1.20 1.10 - 1.33 mmol/L    POCT Glucose, Arterial 106 (H) 74 - 99 mg/dL    POCT Lactate, Arterial 1.0 0.4 - 2.0 mmol/L    POCT Base Excess, Arterial -4.3 (L) -2.0 - 3.0 mmol/L    POCT HCO3 Calculated, Arterial 24.4 22.0 - 26.0 mmol/L    POCT Hemoglobin, Arterial 17.5 13.5 - 17.5 g/dL    POCT Anion Gap, Arterial 10 10 - 25 mmo/L    Patient Temperature 37.0 degrees Celsius    FiO2 100 %   Blood Gas Arterial Full Panel   Result Value Ref Range    POCT pH, Arterial 7.31 (L) 7.38 - 7.42 pH    POCT pCO2, Arterial 43 (H) 38 - 42 mm Hg    POCT pO2, Arterial 287 (H) 85 - 95 mm Hg    POCT SO2, Arterial 100 94 - 100 %    POCT Oxy Hemoglobin, Arterial 97.1 94.0 - 98.0 %    POCT Hematocrit Calculated, Arterial 49.0 41.0 - 52.0 %    POCT Sodium, Arterial 136 136 - 145 mmol/L    POCT Potassium, Arterial 4.0 3.5 - 5.3 mmol/L    POCT Chloride, Arterial 107 98 - 107 mmol/L    POCT Ionized Calcium, Arterial 1.14 1.10 - 1.33 mmol/L    POCT Glucose, Arterial 102  (H) 74 - 99 mg/dL    POCT Lactate, Arterial 0.8 0.4 - 2.0 mmol/L    POCT Base Excess, Arterial -4.5 (L) -2.0 - 3.0 mmol/L    POCT HCO3 Calculated, Arterial 21.7 (L) 22.0 - 26.0 mmol/L    POCT Hemoglobin, Arterial 16.2 13.5 - 17.5 g/dL    POCT Anion Gap, Arterial 11 10 - 25 mmo/L    Patient Temperature 37.0 degrees Celsius    FiO2 100 %   Prepare RBC: 2 Units   Result Value Ref Range    PRODUCT CODE L3565S65     Unit Number [id number]     Unit ABO A     Unit RH POS     XM INTEP COMP     Dispense Status RE     Blood Expiration Date December 08, 2023 23:59 EST     PRODUCT BLOOD TYPE 6200     UNIT VOLUME 350     PRODUCT CODE X2944I33     Unit Number [id number]     Unit ABO A     Unit RH POS     XM INTEP COMP     Dispense Status RE     Blood Expiration Date December 10, 2023 23:59 EST     PRODUCT BLOOD TYPE 6200     UNIT VOLUME 350    Blood Gas Arterial Full Panel   Result Value Ref Range    POCT pH, Arterial 7.34 (L) 7.38 - 7.42 pH    POCT pCO2, Arterial 44 (H) 38 - 42 mm Hg    POCT pO2, Arterial 80 (L) 85 - 95 mm Hg    POCT SO2, Arterial 96 94 - 100 %    POCT Oxy Hemoglobin, Arterial 92.8 (L) 94.0 - 98.0 %    POCT Hematocrit Calculated, Arterial 47.0 41.0 - 52.0 %    POCT Sodium, Arterial 137 136 - 145 mmol/L    POCT Potassium, Arterial 4.0 3.5 - 5.3 mmol/L    POCT Chloride, Arterial 107 98 - 107 mmol/L    POCT Ionized Calcium, Arterial 1.17 1.10 - 1.33 mmol/L    POCT Glucose, Arterial 96 74 - 99 mg/dL    POCT Lactate, Arterial 1.5 0.4 - 2.0 mmol/L    POCT Base Excess, Arterial -2.3 (L) -2.0 - 3.0 mmol/L    POCT HCO3 Calculated, Arterial 23.7 22.0 - 26.0 mmol/L    POCT Hemoglobin, Arterial 15.6 13.5 - 17.5 g/dL    POCT Anion Gap, Arterial 10 10 - 25 mmo/L    Patient Temperature 37.0 degrees Celsius    FiO2 28 %   CBC   Result Value Ref Range    WBC 11.3 4.4 - 11.3 x10*3/uL    nRBC 0.0 0.0 - 0.0 /100 WBCs    RBC 5.00 4.50 - 5.90 x10*6/uL    Hemoglobin 15.9 13.5 - 17.5 g/dL    Hematocrit 47.6 41.0 - 52.0 %     MCV 95 80 - 100 fL    MCH 31.8 26.0 - 34.0 pg    MCHC 33.4 32.0 - 36.0 g/dL    RDW 13.5 11.5 - 14.5 %    Platelets 154 150 - 450 x10*3/uL   Renal Function Panel   Result Value Ref Range    Glucose 86 74 - 99 mg/dL    Sodium 143 136 - 145 mmol/L    Potassium 4.1 3.5 - 5.3 mmol/L    Chloride 109 (H) 98 - 107 mmol/L    Bicarbonate 24 21 - 32 mmol/L    Anion Gap 14 10 - 20 mmol/L    Urea Nitrogen 13 6 - 23 mg/dL    Creatinine 0.90 0.50 - 1.30 mg/dL    eGFR >90 >60 mL/min/1.73m*2    Calcium 8.9 8.6 - 10.6 mg/dL    Phosphorus 3.9 2.5 - 4.9 mg/dL    Albumin 4.0 3.4 - 5.0 g/dL   Magnesium   Result Value Ref Range    Magnesium 1.88 1.60 - 2.40 mg/dL   Calcium, Ionized   Result Value Ref Range    POCT Calcium, Ionized 1.12 1.1 - 1.33 mmol/L   Coagulation Screen   Result Value Ref Range    Protime 14.4 (H) 9.8 - 12.8 seconds    INR 1.3 (H) 0.9 - 1.1    aPTT 29 27 - 38 seconds   Blood Gas Arterial Full Panel   Result Value Ref Range    POCT pH, Arterial 7.37 (L) 7.38 - 7.42 pH    POCT pCO2, Arterial 37 (L) 38 - 42 mm Hg    POCT pO2, Arterial 92 85 - 95 mm Hg    POCT SO2, Arterial 98 94 - 100 %    POCT Oxy Hemoglobin, Arterial 95.2 94.0 - 98.0 %    POCT Hematocrit Calculated, Arterial 45.0 41.0 - 52.0 %    POCT Sodium, Arterial 136 136 - 145 mmol/L    POCT Potassium, Arterial 3.5 3.5 - 5.3 mmol/L    POCT Chloride, Arterial 107 98 - 107 mmol/L    POCT Ionized Calcium, Arterial 1.12 1.10 - 1.33 mmol/L    POCT Glucose, Arterial 122 (H) 74 - 99 mg/dL    POCT Lactate, Arterial 1.1 0.4 - 2.0 mmol/L    POCT Base Excess, Arterial -3.4 (L) -2.0 - 3.0 mmol/L    POCT HCO3 Calculated, Arterial 21.4 (L) 22.0 - 26.0 mmol/L    POCT Hemoglobin, Arterial 14.9 13.5 - 17.5 g/dL    POCT Anion Gap, Arterial 11 10 - 25 mmo/L    Patient Temperature 37.0 degrees Celsius    FiO2 4 %   Magnesium   Result Value Ref Range    Magnesium 1.92 1.60 - 2.40 mg/dL   Calcium, Ionized   Result Value Ref Range    POCT Calcium, Ionized 1.12 1.1 - 1.33 mmol/L    Coagulation Screen   Result Value Ref Range    Protime 14.6 (H) 9.8 - 12.8 seconds    INR 1.3 (H) 0.9 - 1.1    aPTT 28 27 - 38 seconds   Renal Function Panel   Result Value Ref Range    Glucose 87 74 - 99 mg/dL    Sodium 142 136 - 145 mmol/L    Potassium 3.9 3.5 - 5.3 mmol/L    Chloride 108 (H) 98 - 107 mmol/L    Bicarbonate 25 21 - 32 mmol/L    Anion Gap 13 10 - 20 mmol/L    Urea Nitrogen 10 6 - 23 mg/dL    Creatinine 0.76 0.50 - 1.30 mg/dL    eGFR >90 >60 mL/min/1.73m*2    Calcium 8.9 8.6 - 10.6 mg/dL    Phosphorus 4.2 2.5 - 4.9 mg/dL    Albumin 4.1 3.4 - 5.0 g/dL   CBC   Result Value Ref Range    WBC 10.0 4.4 - 11.3 x10*3/uL    nRBC 0.0 0.0 - 0.0 /100 WBCs    RBC 4.58 4.50 - 5.90 x10*6/uL    Hemoglobin 14.2 13.5 - 17.5 g/dL    Hematocrit 43.2 41.0 - 52.0 %    MCV 94 80 - 100 fL    MCH 31.0 26.0 - 34.0 pg    MCHC 32.9 32.0 - 36.0 g/dL    RDW 13.5 11.5 - 14.5 %    Platelets 151 150 - 450 x10*3/uL   POCT GLUCOSE   Result Value Ref Range    POCT Glucose 122 (H) 74 - 99 mg/dL   ECG 12 lead   Result Value Ref Range    Ventricular Rate 61 BPM    Atrial Rate 61 BPM    MT Interval 176 ms    QRS Duration 72 ms    QT Interval 416 ms    QTC Calculation(Bazett) 418 ms    P Axis 57 degrees    R Axis 76 degrees    T Axis 41 degrees    QRS Count 10 beats    Q Onset 223 ms    P Onset 135 ms    P Offset 194 ms    T Offset 431 ms    QTC Fredericia 418 ms   POCT GLUCOSE   Result Value Ref Range    POCT Glucose 148 (H) 74 - 99 mg/dL   Light Blue Top   Result Value Ref Range    Extra Tube Hold for add-ons.    Lavender Top   Result Value Ref Range    Extra Tube Hold for add-ons.    Basic Metabolic Panel   Result Value Ref Range    Glucose 76 74 - 99 mg/dL    Sodium 141 136 - 145 mmol/L    Potassium 4.4 3.5 - 5.3 mmol/L    Chloride 102 98 - 107 mmol/L    Bicarbonate 27 21 - 32 mmol/L    Anion Gap 16 10 - 20 mmol/L    Urea Nitrogen 10 6 - 23 mg/dL    Creatinine 0.81 0.50 - 1.30 mg/dL    eGFR >90 >60 mL/min/1.73m*2    Calcium 9.4 8.6 -  10.6 mg/dL   CBC   Result Value Ref Range    WBC 11.5 (H) 4.4 - 11.3 x10*3/uL    nRBC 0.0 0.0 - 0.0 /100 WBCs    RBC 5.03 4.50 - 5.90 x10*6/uL    Hemoglobin 15.5 13.5 - 17.5 g/dL    Hematocrit 47.8 41.0 - 52.0 %    MCV 95 80 - 100 fL    MCH 30.8 26.0 - 34.0 pg    MCHC 32.4 32.0 - 36.0 g/dL    RDW 13.0 11.5 - 14.5 %    Platelets 157 150 - 450 x10*3/uL   Magnesium   Result Value Ref Range    Magnesium 2.32 1.60 - 2.40 mg/dL   Renal function panel   Result Value Ref Range    Glucose 80 74 - 99 mg/dL    Sodium 142 136 - 145 mmol/L    Potassium 4.2 3.5 - 5.3 mmol/L    Chloride 103 98 - 107 mmol/L    Bicarbonate 27 21 - 32 mmol/L    Anion Gap 16 10 - 20 mmol/L    Urea Nitrogen 11 6 - 23 mg/dL    Creatinine 0.69 0.50 - 1.30 mg/dL    eGFR >90 >60 mL/min/1.73m*2    Calcium 9.2 8.6 - 10.6 mg/dL    Phosphorus 3.2 2.5 - 4.9 mg/dL    Albumin 4.2 3.4 - 5.0 g/dL   Prepare RBC: 4 Units   Result Value Ref Range    PRODUCT CODE [id number]     Unit Number [id number]     Unit ABO A     Unit RH POS     XM INTEP COMP     Dispense Status RE     Blood Expiration Date December 11, 2023 23:59 EST     PRODUCT BLOOD TYPE 6200     UNIT VOLUME 350     PRODUCT CODE [id number]     Unit Number [id number]     Unit ABO A     Unit RH POS     XM INTEP COMP     Dispense Status RE     Blood Expiration Date December 15, 2023 23:59 EST     PRODUCT BLOOD TYPE 6200     UNIT VOLUME 350    Type and screen   Result Value Ref Range    ABO TYPE A     Rh TYPE POS     ANTIBODY SCREEN NEG    Blood Gas Arterial Full Panel Unsolicited   Result Value Ref Range    POCT pH, Arterial 7.34 (L) 7.38 - 7.42 pH    POCT pCO2, Arterial 46 (H) 38 - 42 mm Hg    POCT pO2, Arterial 102 (H) 85 - 95 mm Hg    POCT SO2, Arterial 99 94 - 100 %    POCT Oxy Hemoglobin, Arterial 95.1 94.0 - 98.0 %    POCT Hematocrit Calculated, Arterial 44.0 41.0 - 52.0 %    POCT Sodium, Arterial 135 (L) 136 - 145 mmol/L    POCT Potassium, Arterial 4.0 3.5 - 5.3 mmol/L    POCT Chloride, Arterial 104  98 - 107 mmol/L    POCT Ionized Calcium, Arterial 1.13 1.10 - 1.33 mmol/L    POCT Glucose, Arterial 99 74 - 99 mg/dL    POCT Lactate, Arterial 0.7 0.4 - 2.0 mmol/L    POCT Base Excess, Arterial -1.4 -2.0 - 3.0 mmol/L    POCT HCO3 Calculated, Arterial 24.8 22.0 - 26.0 mmol/L    POCT Hemoglobin, Arterial 14.7 13.5 - 17.5 g/dL    POCT Anion Gap, Arterial 10 10 - 25 mmo/L    Patient Temperature 37.0 degrees Celsius   Calcium, Ionized   Result Value Ref Range    POCT Calcium, Ionized 1.08 (L) 1.1 - 1.33 mmol/L   CBC   Result Value Ref Range    WBC 14.0 (H) 4.4 - 11.3 x10*3/uL    nRBC 0.0 0.0 - 0.0 /100 WBCs    RBC 4.54 4.50 - 5.90 x10*6/uL    Hemoglobin 14.1 13.5 - 17.5 g/dL    Hematocrit 42.8 41.0 - 52.0 %    MCV 94 80 - 100 fL    MCH 31.1 26.0 - 34.0 pg    MCHC 32.9 32.0 - 36.0 g/dL    RDW 12.9 11.5 - 14.5 %    Platelets 152 150 - 450 x10*3/uL   Coagulation Screen   Result Value Ref Range    Protime 13.9 (H) 9.8 - 12.8 seconds    INR 1.2 (H) 0.9 - 1.1    aPTT 28 27 - 38 seconds   Magnesium   Result Value Ref Range    Magnesium 2.25 1.60 - 2.40 mg/dL   Renal Function Panel   Result Value Ref Range    Glucose 91 74 - 99 mg/dL    Sodium 140 136 - 145 mmol/L    Potassium 4.1 3.5 - 5.3 mmol/L    Chloride 104 98 - 107 mmol/L    Bicarbonate 25 21 - 32 mmol/L    Anion Gap 15 10 - 20 mmol/L    Urea Nitrogen 11 6 - 23 mg/dL    Creatinine 0.67 0.50 - 1.30 mg/dL    eGFR >90 >60 mL/min/1.73m*2    Calcium 8.5 (L) 8.6 - 10.6 mg/dL    Phosphorus 3.4 2.5 - 4.9 mg/dL    Albumin 3.9 3.4 - 5.0 g/dL   POCT GLUCOSE   Result Value Ref Range    POCT Glucose 154 (H) 74 - 99 mg/dL   Renal Function Panel   Result Value Ref Range    Glucose 136 (H) 74 - 99 mg/dL    Sodium 140 136 - 145 mmol/L    Potassium 4.3 3.5 - 5.3 mmol/L    Chloride 104 98 - 107 mmol/L    Bicarbonate 27 21 - 32 mmol/L    Anion Gap 13 10 - 20 mmol/L    Urea Nitrogen 14 6 - 23 mg/dL    Creatinine 0.63 0.50 - 1.30 mg/dL    eGFR >90 >60 mL/min/1.73m*2    Calcium 8.8 8.6 - 10.6  mg/dL    Phosphorus 3.6 2.5 - 4.9 mg/dL    Albumin 3.7 3.4 - 5.0 g/dL   Magnesium   Result Value Ref Range    Magnesium 2.24 1.60 - 2.40 mg/dL   Coagulation Screen   Result Value Ref Range    Protime 13.5 (H) 9.8 - 12.8 seconds    INR 1.2 (H) 0.9 - 1.1    aPTT 27 27 - 38 seconds   CBC   Result Value Ref Range    WBC 9.6 4.4 - 11.3 x10*3/uL    nRBC 0.0 0.0 - 0.0 /100 WBCs    RBC 4.69 4.50 - 5.90 x10*6/uL    Hemoglobin 14.6 13.5 - 17.5 g/dL    Hematocrit 44.0 41.0 - 52.0 %    MCV 94 80 - 100 fL    MCH 31.1 26.0 - 34.0 pg    MCHC 33.2 32.0 - 36.0 g/dL    RDW 12.4 11.5 - 14.5 %    Platelets 164 150 - 450 x10*3/uL   Calcium, Ionized   Result Value Ref Range    POCT Calcium, Ionized 1.13 1.1 - 1.33 mmol/L   Blood Gas Arterial Full Panel   Result Value Ref Range    POCT pH, Arterial 7.42 7.38 - 7.42 pH    POCT pCO2, Arterial 43 (H) 38 - 42 mm Hg    POCT pO2, Arterial 94 85 - 95 mm Hg    POCT SO2, Arterial 99 94 - 100 %    POCT Oxy Hemoglobin, Arterial 95.2 94.0 - 98.0 %    POCT Hematocrit Calculated, Arterial 46.0 41.0 - 52.0 %    POCT Sodium, Arterial 133 (L) 136 - 145 mmol/L    POCT Potassium, Arterial 4.2 3.5 - 5.3 mmol/L    POCT Chloride, Arterial 103 98 - 107 mmol/L    POCT Ionized Calcium, Arterial 1.18 1.10 - 1.33 mmol/L    POCT Glucose, Arterial 153 (H) 74 - 99 mg/dL    POCT Lactate, Arterial 1.2 0.4 - 2.0 mmol/L    POCT Base Excess, Arterial 2.9 -2.0 - 3.0 mmol/L    POCT HCO3 Calculated, Arterial 27.9 (H) 22.0 - 26.0 mmol/L    POCT Hemoglobin, Arterial 15.3 13.5 - 17.5 g/dL    POCT Anion Gap, Arterial 6 (L) 10 - 25 mmo/L    Patient Temperature 37.0 degrees Celsius    FiO2 40 %   POCT GLUCOSE   Result Value Ref Range    POCT Glucose 141 (H) 74 - 99 mg/dL   POCT GLUCOSE   Result Value Ref Range    POCT Glucose 135 (H) 74 - 99 mg/dL   POCT GLUCOSE   Result Value Ref Range    POCT Glucose 141 (H) 74 - 99 mg/dL          Scheduled medications  acetaminophen, 650 mg, oral, q6h  aspirin, 81 mg, oral, Daily  atorvastatin,  80 mg, oral, Nightly  [Held by provider] bisoprolol, 2.5 mg, oral, Daily  [Held by provider] escitalopram, 5 mg, oral, Daily  heparin (porcine), 5,000 Units, subcutaneous, q8h  [Held by provider] hydroCHLOROthiazide, 25 mg, oral, q AM  [Held by provider] losartan, 25 mg, oral, Daily  pantoprazole, 40 mg, intravenous, Daily before breakfast      Continuous medications     PRN medications  PRN medications: benzocaine-menthol, HYDROmorphone, labetaloL, oxyCODONE, oxyCODONE, oxygen        Assessment/Plan   David Block is a 73 year old male with PMH of idiopathic pulmonary fibrosis (being evaluated for lung transplant), and severe left carotid artery stenosis. He is s/p left carotid endarterectomy by Dr. Morgan on 11/20/23. Post-operative course notable for left neck hematoma, hoarseness, and frontal/orbital headache. No SOB, tightness of the skin, or focal deficits.    Patient POD 1 s/p evacuation of left neck hematoma. ENT consulted for arytenoid swelling. Extubation as per ENT. Neck with minimal swelling at this time.     Plan  Neuro: Acute post op pain,   -Continue pain regimen of Tylenol, Oxycodone and dilaudid for breakthrough pain  -Continue neuro checks q4h  -Maintain HOB >30 degrees at all times  -Magnesium 4 mg IV for headache  -Monitor for signs of worsening headache  -Continue home Lexapro    CV: Carotid artery stenosis, HTN, HLD, CAD with recent coronary heart cath (9/2023)  -Continue ASA  -Resume statin  -Blood pressure control    Pulm: Hx of idiopathic pulmonary fibrosis (on 4L NC at home with activity)  -IS q1h while awake  -Continue supplemental O2 as needed  -Continuous pulse ox  -Monitor for signs of respiratory compromise    FENGI: BPH, vitamin B deficiency  -Continue NPO  -I&Os  -RFP as clinically indicated  -Replete electrolytes to maintain K >4 and Mg >2  -Continue vitamin D    Endo:  -No issues    Heme: Left neck hematoma  -Daily CBC  -Monitor ULISES drain output    ID:  -Afebrile  -CBC as clinically  indicated  -Perioperative Ancef completed    MSK: Knee OA  -PT eval    Ppx:  -SCDs    Dispo: continue care on nursing floor       Ada José, APRN-CNP

## 2023-11-24 NOTE — PROGRESS NOTES
Physical Therapy    Physical Therapy Evaluation & Treatment    Patient Name: David Block  MRN: [id number]  Today's Date: 11/24/2023   Time Calculation  Start Time: 1107  Stop Time: 1133  Time Calculation (min): 26 min    Assessment/Plan   PT Assessment  PT Assessment Results: Decreased endurance, Decreased mobility, Decreased strength, Impaired balance  Rehab Prognosis: Good  End of Session Communication: Bedside nurse  End of Session Patient Position: Up in chair, Alarm off, not on at start of session   IP OR SWING BED PT PLAN  Inpatient or Swing Bed: Inpatient  PT Plan  Treatment/Interventions: Bed mobility, Transfer training, Gait training, Endurance training, Therapeutic exercise, Therapeutic activity, Home exercise program  PT Plan: Skilled PT  PT Frequency: 3 times per week  PT Discharge Recommendations: No PT needed after discharge  PT Recommended Transfer Status: Assist x1  PT - OK to Discharge: Yes      Subjective     General Visit Information:  General  Reason for Referral: Symptomatic stenosis of left carotid artery s/p left carotid endarterectomy on 11/20/23 complicated by a neck hematoma  Past Medical History Relevant to Rehab: idiopathic pulmonary fibrosis (on home 4L oxygen) and severe L carotid artery stenosis  Prior to Session Communication: Bedside nurse  Patient Position Received: Up in chair, Alarm off, not on at start of session  Preferred Learning Style: verbal  General Comment: Pt seated in chair upon arrival, pleasant and willing to partiicpate in PT session. Pt would benfit from skilled PT while inpatient to increase independence with all mobility, anticipate no PT needs upon D/C.  Home Living:  Home Living  Type of Home: House  Lives With: Spouse (can assist upon D/C)  Home Adaptive Equipment: Cane, Walker rolling or standard  Home Layout: Two level, Two level;Able to live on main level with bedroom/bathroom  Home Access: No concerns  Bathroom Shower/Tub: Tub/shower unit  Prior Level of  Function:  Prior Function Per Pt/Caregiver Report  Level of Randall: Independent with ADLs and functional transfers, Independent with homemaking with ambulation  ADL Assistance: Independent  Ambulatory Assistance: Independent  Prior Function Comments: +drives  Precautions:  Precautions  Medical Precautions: Fall precautions (HOB elevated, denies falls)  Vital Signs:  Vital Signs  Heart Rate: 76  SpO2: 95 %  BP: 128/72  Patient Position: Sitting    Objective   Pain:  Pain Assessment  Pain Assessment: 0-10  Cognition:  Cognition  Overall Cognitive Status: Within Functional Limits  Orientation Level: Oriented X4    General Assessments:                 Sensation  Light Touch: No apparent deficits    Strength  Strength Comments: BLE's WFL based on mobility                 Static Sitting Balance  Static Sitting-Level of Assistance: Independent  Dynamic Sitting Balance  Dynamic Sitting-Balance:  (Independent)    Static Standing Balance  Static Standing-Level of Assistance: Close supervision  Dynamic Standing Balance  Dynamic Standing-Balance:  (SBA)  Functional Assessments:  Bed Mobility  Bed Mobility: Yes  Bed Mobility 1  Bed Mobility 1: Supine to sitting, Sitting to supine  Level of Assistance 1: Close supervision  Bed Mobility Comments 1: HOB elevated    Transfers  Transfer: Yes  Transfer 1  Transfer From 1: Sit to  Transfer to 1: Stand  Technique 1: Sit to stand  Transfer Device 1: Walker (with/without AD)  Transfer Level of Assistance 1: Close supervision  Trials/Comments 1: cues for safe hand placement  Transfers 2  Transfer From 2: Stand to  Transfer to 2: Sit  Technique 2: Stand to sit  Transfer Device 2: Walker  Transfer Level of Assistance 2: Close supervision  Trials/Comments 2: cues for safe hand placement  Transfers 3  Transfer From 3: Bed to, Chair with arms to  Transfer to 3: Bed, Chair without arms  Technique 3: Stand pivot  Transfer Device 3: Walker (with/without walker)  Transfer Level of Assistance  3: Close supervision    Ambulation/Gait Training  Ambulation/Gait Training Performed: Yes  Ambulation/Gait Training 1  Device 1: Rolling walker (with/without walker)  Assistance 1: Close supervision  Comments/Distance (ft) 1: 2x15 ft (cues for taking rest breaks as needed, breathing exs and walker safetty. SpO2 to 87% with ambulation, improves to >90% with rest and breathing exs in < 1 min)  Extremity/Trunk Assessments:  RLE   RLE : Within Functional Limits     Treatments:  Therapeutic Exercise  Therapeutic Exercise Performed: Yes  Therapeutic Exercise Activity 1: Seated BLE AP, LAQ, Hip F 1x10 reps. Cued for techniques.    Ambulation/Gait Training  Ambulation/Gait Training Performed: Yes  Ambulation/Gait Training 1  Device 1: Rolling walker (with/without walker)  Assistance 1: Close supervision  Comments/Distance (ft) 1: 2x15 ft (cues for taking rest breaks as needed, breathing exs and walker safetty. SpO2 to 87% with ambulation, improves to >90% with rest and breathing exs in < 1 min)  Transfers  Transfer: Yes  Transfer 1  Transfer From 1: Sit to  Transfer to 1: Stand  Technique 1: Sit to stand  Transfer Device 1: Walker (with/without AD)  Transfer Level of Assistance 1: Close supervision  Trials/Comments 1: cues for safe hand placement  Transfers 2  Transfer From 2: Stand to  Transfer to 2: Sit  Technique 2: Stand to sit  Transfer Device 2: Walker  Transfer Level of Assistance 2: Close supervision  Trials/Comments 2: cues for safe hand placement  Transfers 3  Transfer From 3: Bed to, Chair with arms to  Transfer to 3: Bed, Chair without arms  Technique 3: Stand pivot  Transfer Device 3: Walker (with/without walker)  Transfer Level of Assistance 3: Close supervision  Outcome Measures:  [facility] Basic Mobility  Turning from your back to your side while in a flat bed without using bedrails: A little  Moving from lying on your back to sitting on the side of a flat bed without using bedrails: A little  Moving to and from  bed to chair (including a wheelchair): A little  Standing up from a chair using your arms (e.g. wheelchair or bedside chair): A little  To walk in hospital room: A little  Climbing 3-5 steps with railing: A little  Basic Mobility - Total Score: 18    Encounter Problems       Encounter Problems (Active)       Mobility       Patient will be Pravin with ambulation 100 ft with LRAD (Progressing)       Start:  11/24/23    Expected End:  12/08/23               Transfers       Patient will be Pravin with sit to stand and bed to chair transfers with LRAD (Progressing)       Start:  11/24/23    Expected End:  12/08/23            Patient will be Pravin with bed mobiltiy (Progressing)       Start:  11/24/23    Expected End:  12/08/23                   Education Documentation  Precautions, taught by Teresita Yip PT at 11/24/2023  1:57 PM.  Learner: Patient  Readiness: Acceptance  Method: Explanation  Response: Verbalizes Understanding    Home Exercise Program, taught by Teresita Yip PT at 11/24/2023  1:57 PM.  Learner: Patient  Readiness: Acceptance  Method: Explanation  Response: Verbalizes Understanding    Mobility Training, taught by Teresita Yip PT at 11/24/2023  1:57 PM.  Learner: Patient  Readiness: Acceptance  Method: Explanation  Response: Verbalizes Understanding    Education Comments  No comments found.

## 2023-11-24 NOTE — CARE PLAN
The patient's goals for the shift include      The clinical goals for the shift include pt will remain HD stable throughout shift    Over the shift, the patient did not make progress toward the following goals. Barriers to progression include none. Recommendations to address these barriers include none.

## 2023-11-25 LAB
ALBUMIN SERPL BCP-MCNC: 3.6 G/DL (ref 3.4–5)
ANION GAP SERPL CALC-SCNC: 14 MMOL/L (ref 10–20)
BUN SERPL-MCNC: 18 MG/DL (ref 6–23)
CALCIUM SERPL-MCNC: 8.9 MG/DL (ref 8.6–10.6)
CHLORIDE SERPL-SCNC: 106 MMOL/L (ref 98–107)
CO2 SERPL-SCNC: 28 MMOL/L (ref 21–32)
CREAT SERPL-MCNC: 0.65 MG/DL (ref 0.5–1.3)
ERYTHROCYTE [DISTWIDTH] IN BLOOD BY AUTOMATED COUNT: 13.1 % (ref 11.5–14.5)
GFR SERPL CREATININE-BSD FRML MDRD: >90 ML/MIN/1.73M*2
GLUCOSE SERPL-MCNC: 75 MG/DL (ref 74–99)
HCT VFR BLD AUTO: 46.8 % (ref 41–52)
HGB BLD-MCNC: 15.2 G/DL (ref 13.5–17.5)
MAGNESIUM SERPL-MCNC: 2.57 MG/DL (ref 1.6–2.4)
MCH RBC QN AUTO: 31.5 PG (ref 26–34)
MCHC RBC AUTO-ENTMCNC: 32.5 G/DL (ref 32–36)
MCV RBC AUTO: 97 FL (ref 80–100)
NRBC BLD-RTO: 0 /100 WBCS (ref 0–0)
PHOSPHATE SERPL-MCNC: 3 MG/DL (ref 2.5–4.9)
PLATELET # BLD AUTO: 189 X10*3/UL (ref 150–450)
POTASSIUM SERPL-SCNC: 4.7 MMOL/L (ref 3.5–5.3)
RBC # BLD AUTO: 4.83 X10*6/UL (ref 4.5–5.9)
SODIUM SERPL-SCNC: 143 MMOL/L (ref 136–145)
WBC # BLD AUTO: 11 X10*3/UL (ref 4.4–11.3)

## 2023-11-25 PROCEDURE — 2500000004 HC RX 250 GENERAL PHARMACY W/ HCPCS (ALT 636 FOR OP/ED)

## 2023-11-25 PROCEDURE — 85027 COMPLETE CBC AUTOMATED: CPT

## 2023-11-25 PROCEDURE — 2500000001 HC RX 250 WO HCPCS SELF ADMINISTERED DRUGS (ALT 637 FOR MEDICARE OP)

## 2023-11-25 PROCEDURE — 1200000002 HC GENERAL ROOM WITH TELEMETRY DAILY

## 2023-11-25 PROCEDURE — 80069 RENAL FUNCTION PANEL: CPT

## 2023-11-25 PROCEDURE — C9113 INJ PANTOPRAZOLE SODIUM, VIA: HCPCS

## 2023-11-25 PROCEDURE — 36415 COLL VENOUS BLD VENIPUNCTURE: CPT

## 2023-11-25 PROCEDURE — 83735 ASSAY OF MAGNESIUM: CPT

## 2023-11-25 PROCEDURE — 96372 THER/PROPH/DIAG INJ SC/IM: CPT

## 2023-11-25 RX ADMIN — ATORVASTATIN CALCIUM 80 MG: 80 TABLET, FILM COATED ORAL at 21:46

## 2023-11-25 RX ADMIN — HEPARIN SODIUM 5000 UNITS: 5000 INJECTION INTRAVENOUS; SUBCUTANEOUS at 08:15

## 2023-11-25 RX ADMIN — PANTOPRAZOLE SODIUM 40 MG: 40 INJECTION, POWDER, FOR SOLUTION INTRAVENOUS at 06:21

## 2023-11-25 RX ADMIN — ACETAMINOPHEN 650 MG: 325 TABLET ORAL at 22:42

## 2023-11-25 RX ADMIN — ACETAMINOPHEN 650 MG: 325 TABLET ORAL at 10:30

## 2023-11-25 RX ADMIN — ASPIRIN 81 MG CHEWABLE TABLET 81 MG: 81 TABLET CHEWABLE at 08:15

## 2023-11-25 RX ADMIN — ACETAMINOPHEN 650 MG: 325 TABLET ORAL at 17:48

## 2023-11-25 RX ADMIN — HEPARIN SODIUM 5000 UNITS: 5000 INJECTION INTRAVENOUS; SUBCUTANEOUS at 01:10

## 2023-11-25 RX ADMIN — HEPARIN SODIUM 5000 UNITS: 5000 INJECTION INTRAVENOUS; SUBCUTANEOUS at 17:48

## 2023-11-25 RX ADMIN — OXYCODONE HYDROCHLORIDE 5 MG: 5 TABLET ORAL at 01:13

## 2023-11-25 ASSESSMENT — COGNITIVE AND FUNCTIONAL STATUS - GENERAL
CLIMB 3 TO 5 STEPS WITH RAILING: A LITTLE
CLIMB 3 TO 5 STEPS WITH RAILING: A LITTLE
DAILY ACTIVITIY SCORE: 24
STANDING UP FROM CHAIR USING ARMS: A LITTLE
WALKING IN HOSPITAL ROOM: A LITTLE
WALKING IN HOSPITAL ROOM: A LITTLE
STANDING UP FROM CHAIR USING ARMS: A LITTLE
DAILY ACTIVITIY SCORE: 24
MOBILITY SCORE: 21
MOBILITY SCORE: 21

## 2023-11-25 ASSESSMENT — PAIN SCALES - GENERAL
PAINLEVEL_OUTOF10: 0 - NO PAIN
PAINLEVEL_OUTOF10: 5 - MODERATE PAIN
PAINLEVEL_OUTOF10: 0 - NO PAIN
PAINLEVEL_OUTOF10: 4
PAINLEVEL_OUTOF10: 0 - NO PAIN
PAINLEVEL_OUTOF10: 0 - NO PAIN

## 2023-11-25 ASSESSMENT — PAIN DESCRIPTION - ORIENTATION: ORIENTATION: LEFT

## 2023-11-25 ASSESSMENT — PAIN - FUNCTIONAL ASSESSMENT: PAIN_FUNCTIONAL_ASSESSMENT: WONG-BAKER FACES

## 2023-11-25 ASSESSMENT — PAIN DESCRIPTION - LOCATION: LOCATION: NECK

## 2023-11-25 ASSESSMENT — VISUAL ACUITY: OU: 1

## 2023-11-25 NOTE — CARE PLAN
The patient's goals for the shift include      The clinical goals for the shift include Pt will remain hemodynamically stable throughout this shift.    Problem: Skin  Goal: Decreased wound size/increased tissue granulation at next dressing change  11/24/2023 2315 by Salvador Chaidez RN  Outcome: Progressing  11/24/2023 2315 by Salvador Chaidez RN  Outcome: Progressing  Goal: Participates in plan/prevention/treatment measures  Outcome: Progressing  Goal: Prevent/manage excess moisture  Outcome: Progressing  Goal: Prevent/minimize sheer/friction injuries  Outcome: Progressing  Goal: Promote/optimize nutrition  Outcome: Progressing  Goal: Promote skin healing  Outcome: Progressing     Problem: Fall/Injury  Goal: Not fall by end of shift  Outcome: Progressing  Goal: Be free from injury by end of the shift  Outcome: Progressing  Goal: Verbalize understanding of personal risk factors for fall in the hospital  Outcome: Progressing  Goal: Verbalize understanding of risk factor reduction measures to prevent injury from fall in the home  Outcome: Progressing  Goal: Use assistive devices by end of the shift  Outcome: Progressing  Goal: Pace activities to prevent fatigue by end of the shift  Outcome: Progressing     Problem: Pain  Goal: Takes deep breaths with improved pain control throughout the shift  Outcome: Progressing  Goal: Turns in bed with improved pain control throughout the shift  Outcome: Progressing  Goal: Walks with improved pain control throughout the shift  Outcome: Progressing  Goal: Performs ADL's with improved pain control throughout shift  Outcome: Progressing  Goal: Participates in PT with improved pain control throughout the shift  Outcome: Progressing  Goal: Free from opioid side effects throughout the shift  Outcome: Progressing  Goal: Free from acute confusion related to pain meds throughout the shift  Outcome: Progressing     Problem: Pain  Goal: My pain/discomfort is manageable  Outcome:  Progressing     Problem: Safety  Goal: Patient will be injury free during hospitalization  Outcome: Progressing  Goal: I will remain free of falls  Outcome: Progressing     Problem: Daily Care  Goal: Daily care needs are met  Outcome: Progressing     Problem: Psychosocial Needs  Goal: Demonstrates ability to cope with hospitalization/illness  Outcome: Progressing  Goal: Collaborate with me, my family, and caregiver to identify my specific goals  Outcome: Progressing     Problem: Discharge Barriers  Goal: My discharge needs are met  Outcome: Progressing     Problem: Safety - Medical Restraint  Goal: Remains free of injury from restraints (Restraint for Interference with Medical Device)  Outcome: Progressing  Goal: Free from restraint(s) (Restraint for Interference with Medical Device)  Outcome: Progressing

## 2023-11-26 VITALS
OXYGEN SATURATION: 94 % | HEART RATE: 90 BPM | BODY MASS INDEX: 29.97 KG/M2 | DIASTOLIC BLOOD PRESSURE: 85 MMHG | RESPIRATION RATE: 26 BRPM | TEMPERATURE: 97.7 F | WEIGHT: 190.92 LBS | HEIGHT: 67 IN | SYSTOLIC BLOOD PRESSURE: 161 MMHG

## 2023-11-26 PROCEDURE — 2500000004 HC RX 250 GENERAL PHARMACY W/ HCPCS (ALT 636 FOR OP/ED)

## 2023-11-26 PROCEDURE — C9113 INJ PANTOPRAZOLE SODIUM, VIA: HCPCS

## 2023-11-26 PROCEDURE — 96372 THER/PROPH/DIAG INJ SC/IM: CPT

## 2023-11-26 PROCEDURE — 2500000001 HC RX 250 WO HCPCS SELF ADMINISTERED DRUGS (ALT 637 FOR MEDICARE OP)

## 2023-11-26 PROCEDURE — 2500000001 HC RX 250 WO HCPCS SELF ADMINISTERED DRUGS (ALT 637 FOR MEDICARE OP): Performed by: NURSE PRACTITIONER

## 2023-11-26 RX ADMIN — ACETAMINOPHEN 650 MG: 325 TABLET ORAL at 04:10

## 2023-11-26 RX ADMIN — HEPARIN SODIUM 5000 UNITS: 5000 INJECTION INTRAVENOUS; SUBCUTANEOUS at 02:49

## 2023-11-26 RX ADMIN — LOSARTAN POTASSIUM 25 MG: 25 TABLET, FILM COATED ORAL at 08:54

## 2023-11-26 RX ADMIN — HEPARIN SODIUM 5000 UNITS: 5000 INJECTION INTRAVENOUS; SUBCUTANEOUS at 08:54

## 2023-11-26 RX ADMIN — PANTOPRAZOLE SODIUM 40 MG: 40 INJECTION, POWDER, FOR SOLUTION INTRAVENOUS at 08:51

## 2023-11-26 RX ADMIN — ASPIRIN 81 MG CHEWABLE TABLET 81 MG: 81 TABLET CHEWABLE at 08:54

## 2023-11-26 ASSESSMENT — COGNITIVE AND FUNCTIONAL STATUS - GENERAL
DAILY ACTIVITIY SCORE: 24
MOBILITY SCORE: 24

## 2023-11-26 ASSESSMENT — PAIN SCALES - GENERAL: PAINLEVEL_OUTOF10: 0 - NO PAIN

## 2023-11-26 ASSESSMENT — PAIN - FUNCTIONAL ASSESSMENT: PAIN_FUNCTIONAL_ASSESSMENT: 0-10

## 2023-11-26 NOTE — DISCHARGE SUMMARY
Discharge Diagnosis  Symptomatic stenosis of left carotid artery    Issues Requiring Follow-Up  Post-op follow up in 1 month with [facility].    Test Results Pending At Discharge  Pending Labs       Order Current Status    Surgical Pathology Exam In process            Hospital Course  73 year old male with past medical history significant for HTN, HLD, severe left carotid artery stenosis, idiopathic pulmonary fibrosis who is currently being worked up for lung transplant. He is admitted status post left carotid endarterectomy. He was transferred to the ICU post operatively, extubated and stable. He was noted to have a small but stable neck hematoma post operatively. He ended up having to be taken back to the OR for evacuation of a hematoma and had a drain placed. He otherwise progressed as expected post operatively, remaining hemodynamically stable and neuro intact. Drain was removed on 11/25 as it had less than 25cc of output over the previous 24 hours. He then stayed overnight after the drain removal to monitor for any signs of worsening swelling or hematoma formation. He did not develop a worsening hematoma and tolerated a regular diet the following day.    He was discharged home on Aspirin 81mg and will follow up in the outpatient vascular surgery clinic in 1 month with carotid duplex as scheduled.     Pertinent Physical Exam At Time of Discharge  Constitutional:       General: He is awake. He is not in acute distress.     Appearance: Normal appearance. He is well-developed and normal weight.   HENT:      Head: Normocephalic.      Right Ear: Hearing normal.      Mouth/Throat:      Mouth: Mucous membranes are moist.   Eyes:      General: Vision grossly intact. No scleral icterus.  Neck:      Vascular: No JVD.      Trachea: No tracheal deviation.      Comments: Minimal serosang drainage on neck dressing. Removed. Neck is tender, but soft and there are no hematomas. No issues swallowing or speaking.  Cardiovascular:       Rate and Rhythm: Normal rate and regular rhythm.      Pulses:           Radial pulses are 2+ on the right side and 2+ on the left side.        Dorsalis pedis pulses are 2+ on the right side and 2+ on the left side.        Posterior tibial pulses are 2+ on the right side and 2+ on the left side.   Pulmonary:      Effort: Pulmonary effort is normal.   Chest:      Chest wall: No deformity.   Abdominal:      General: Abdomen is protuberant. There is no distension.   Musculoskeletal:      Cervical back: Full passive range of motion without pain and normal range of motion. Tenderness (appropriate incisional tenderness) present.      Right lower leg: No edema.      Left lower leg: No edema.   Skin:     General: Skin is warm.      Capillary Refill: Capillary refill takes less than 2 seconds.      Comments: Incision is clean, dry, and intact with Dermabond in place.   Neurological:      General: No focal deficit present.      Mental Status: He is alert and oriented to person, place, and time.      Cranial Nerves: Cranial nerves 2-12 are intact.      Sensory: Sensation is intact.      Motor: Motor function is intact.   Psychiatric:         Mood and Affect: Mood and affect normal.     Home Medications     Medication List      START taking these medications     oxyCODONE 5 mg immediate release tablet; Commonly known as: Roxicodone;   Take 1 tablet (5 mg) by mouth every 6 hours if needed for moderate pain (4   - 6) for up to 5 days.     CONTINUE taking these medications     acetaminophen 325 mg capsule; Commonly known as: Tylenol   aspirin 81 mg capsule   atorvastatin 80 mg tablet; Commonly known as: Lipitor   b complex vitamins capsule   bisoprolol 5 mg tablet; Commonly known as: Zebeta   CENTRUM ORAL   cholecalciferol 125 MCG (5000 UT) capsule; Commonly known as: Vitamin   D-3   diphenhydrAMINE-acetaminophen  mg per tablet; Commonly known as:   Tylenol PM   escitalopram 5 mg tablet; Commonly known as: Lexapro    hydroCHLOROthiazide 25 mg tablet; Commonly known as: HYDRODiuril   losartan 25 mg tablet; Commonly known as: Cozaar   oxygen gas therapy; Commonly known as: O2     STOP taking these medications     pirfenidone 267 mg tablet tablet; Commonly known as: Esbriet       Outpatient Follow-Up  Future Appointments   Date Time Provider Department Center   11/29/2023  3:00 PM Elidia Paul RD, LD [id number]   12/15/2023  1:00 PM ELIOT Alston-CNP [id number]   12/22/2023 11:00 AM [facility] VAS 3 [facility]   12/22/2023  1:00 PM Erickson Morgan MD [facility] Bon Root MD

## 2023-11-26 NOTE — CARE PLAN
The patient's goals for the shift include      The clinical goals for the shift include PT will remain hemodynamically stable throughout this shift    Problem: Skin  Goal: Decreased wound size/increased tissue granulation at next dressing change  Outcome: Progressing  Goal: Participates in plan/prevention/treatment measures  Outcome: Progressing  Goal: Prevent/manage excess moisture  Outcome: Progressing  Goal: Prevent/minimize sheer/friction injuries  Outcome: Progressing  Goal: Promote/optimize nutrition  Outcome: Progressing  Goal: Promote skin healing  Outcome: Progressing     Problem: Fall/Injury  Goal: Not fall by end of shift  Outcome: Progressing  Goal: Be free from injury by end of the shift  Outcome: Progressing  Goal: Verbalize understanding of personal risk factors for fall in the hospital  Outcome: Progressing  Goal: Verbalize understanding of risk factor reduction measures to prevent injury from fall in the home  Outcome: Progressing  Goal: Use assistive devices by end of the shift  Outcome: Progressing  Goal: Pace activities to prevent fatigue by end of the shift  Outcome: Progressing     Problem: Pain  Goal: Takes deep breaths with improved pain control throughout the shift  Outcome: Progressing  Goal: Turns in bed with improved pain control throughout the shift  Outcome: Progressing  Goal: Walks with improved pain control throughout the shift  Outcome: Progressing  Goal: Performs ADL's with improved pain control throughout shift  Outcome: Progressing  Goal: Participates in PT with improved pain control throughout the shift  Outcome: Progressing  Goal: Free from opioid side effects throughout the shift  Outcome: Progressing  Goal: Free from acute confusion related to pain meds throughout the shift  Outcome: Progressing     Problem: Pain  Goal: My pain/discomfort is manageable  Outcome: Progressing     Problem: Safety  Goal: Patient will be injury free during hospitalization  Outcome:  Progressing  Goal: I will remain free of falls  Outcome: Progressing     Problem: Daily Care  Goal: Daily care needs are met  Outcome: Progressing     Problem: Psychosocial Needs  Goal: Demonstrates ability to cope with hospitalization/illness  Outcome: Progressing  Goal: Collaborate with me, my family, and caregiver to identify my specific goals  Outcome: Progressing     Problem: Discharge Barriers  Goal: My discharge needs are met  Outcome: Progressing     Problem: Safety - Medical Restraint  Goal: Remains free of injury from restraints (Restraint for Interference with Medical Device)  Outcome: Progressing  Goal: Free from restraint(s) (Restraint for Interference with Medical Device)  Outcome: Progressing

## 2023-11-27 DIAGNOSIS — J84.9 INTERSTITIAL LUNG DISEASE (MULTI): Primary | ICD-10-CM

## 2023-11-27 NOTE — PROGRESS NOTES
Spoke with pt regarding his Esbriet. Mr. Block had a left carotid endarterectomy on 11/20/23 and was discharged yesterday, 11/26/23. He just noticed on his discharge papers that it says to stop his Esbriet. He last took it this morning. He is inquiring if he should continue to take it or if he should stop. Dr. Delaney and Dr. Montague were notified. Per Dr. Delaney, pt needs to get a hepatic function panel and if levels are normal then continue taking medication. Pt was updated on plan of care and verbalized understanding. He stated that he will get the blood work done at his PCP office. Order for hepatic function panel was faxed to Dr. Juárez's office at [phone]. Fax confirmation was received.

## 2023-11-28 ENCOUNTER — NUTRITION (OUTPATIENT)
Dept: TRANSPLANT | Facility: HOSPITAL | Age: 73
End: 2023-11-28
Payer: MEDICARE

## 2023-11-28 DIAGNOSIS — J84.9 ILD (INTERSTITIAL LUNG DISEASE) (MULTI): Primary | ICD-10-CM

## 2023-11-28 NOTE — PROGRESS NOTES
Reason for Nutrition Visit:  Pt is a 73 y.o. male referred for   1. ILD (interstitial lung disease) (CMS/HCC)          Past Medical Hx:  Patient Active Problem List   Diagnosis    BPH with urinary obstruction    Cancer of skin of external cheek    Erectile dysfunction    Esophageal reflux    Elevated PSA    Coronary artery disease    HTN (hypertension)    Hypogonadism male    Osteoarthritis of knee    Other secondary pulmonary hypertension (CMS/HCC)    Prostate mass    Abnormal CT of the chest    Adverse effect of corticosteroids    Hypercholesterolemia    ILD (interstitial lung disease) (CMS/HCC)    Lung consolidation (CMS/HCC)    Mild vitamin D deficiency    Symptomatic stenosis of left carotid artery        Food and Nutrition Hx:  Pt. reports he eats 3 meals a day with an occasional snack. Pt. states he tries to eat as healthy as possible.  Pt.states he always use whole wheat products, but does not use a lot of bread.      Typical Food Intake:  Meal 1: 16 oz water, 2 cups of coffee (cream), cereal( corn flakes), pastry ,or egg with toast   Meal 2: salad (bag ,cucumbers, green peppers, ranch dressing ), water or ice tea   Meal 3:  chili, chicken noddle soup, meatloaf, beef soup,  varies on the day  Snacks: not a big snack eater, chips or pretzels occasionally   Beverages:  pepsi (2-3 a week), water (3 quarters of a gallon of water), ice tea     WEIGHT HISTORY  CW: 190# on 11/21/2023  BMI: 29.9  Weight change:  Weight has gradually come up 200# now at 190# after cutting out pop. Pt. Reports his typical weight was 175-185#. Pt. reports his activity level has decreased.  Significant Weight Change: No    Lab Results   Component Value Date    HGBA1C 6.3 (A) 09/29/2023    CHOL 161 09/29/2023    LDLF 55 09/29/2023    TRIG 362 (H) 09/29/2023    BUN 18 11/25/2023    PHOS 3.0 11/25/2023    K 4.7 11/25/2023       Food Preparation: Partner/Spouse  Cooking Skills/Barriers: Disability/Limited Mobility due to lung disease. Wife  is helping out more now.  Grocery Shopping: Partner/Spouse    Allergies: None  Intolerance: None  Appetite: Fluctuates Since his activity level has change,his appetite has changed too. Pt. reports he was typically a active paul but now appetite not what it use to be   GI Symptoms : None Frequency: no  Swallowing Difficulty: No problems with swallowing at this time but he temporally had problems swallowing after the carotid endarterectomy.   Dentition : lower denture/partial and upper denture/partial     Eating Out Type: Restaurant 2-3 times a month, generally something light like fish sandwich or soup.   Convenience Foods: Canned Soup  Bardales soups occasionally but tries to make his own soup with wife from scratch.  In general they cook from scratch.     Types of Activities: House/Yard Work, Cooking. Was able to do activities all summer wearing his portable oxygen and would take breaks as needed. Currently in pulmonary rehab program, 3 times a week Mon, Wed, and Fri    Sleep duration/quality : 7+ hours and continuous sleep  Sleep disorders: none    Supplements: Multivitamin, B-Complex, and Vitamin D daily      Nutrition Focused Physical Exam:    Performed/Deferred: Deferred due to be being virtual visit. He denies having muscle mass loss.    Estimated Needs:    Weight Maintanence: 1900 kcal/day , Protein 70-86gm/day  Weight Loss Needs: 1600 kcal/day      Nutrition Diagnosis:    Diagnosis Statement 1:  Diagnosis Status: New  Diagnosis : Overweight related likely multiple etiologies such as genetics, diet but currently influenced by limited physical activity as evidenced by BMI of 29.9.       Nutrition Interventions:   Explained to patient the nutrition priorities leading up to transplant.   Weight management; informed him that his BMI is within the limits of transplant but if he is struggling in the future to maintain his weight, we will work with him on it.  Encouraged him to follow a lower carbohydrate diet to  support weight management, blood sugar control, and healthy triglyceride levels.   2. Informed him of nutrition priorities to anticipate post transplant.   Food Safety guidelines    Blood sugar management   Weight management     Nutrition Goals:  Nutrition Goals : Carbohydrate consistency  Maintain stable weight  Reduce Triglyceride level    Nutrition Recommendations:  1) He will continue to eat 3 meals a day of mostly home prepared foods.   2) He will continue to be as physical active as possible, to promote weight management and to stay strong for transplant.    Follow up: PRN, especially if he needs help with weight management.     Educational Handouts: Weight loss and Metabolism Handout

## 2023-11-29 ENCOUNTER — APPOINTMENT (OUTPATIENT)
Dept: TRANSPLANT | Facility: HOSPITAL | Age: 73
End: 2023-11-29
Payer: MEDICARE

## 2023-11-29 ENCOUNTER — APPOINTMENT (OUTPATIENT)
Dept: VASCULAR SURGERY | Facility: HOSPITAL | Age: 73
End: 2023-11-29
Payer: MEDICARE

## 2023-11-30 NOTE — OP NOTE
Endarterectomy Carotid Artery (L) Operative Note     Date: 2023  OR Location: [facility] OR    Name: David Block, : 1950, Age: 73 y.o., MRN: [id number], Sex: male    Diagnosis  Pre-op Diagnosis     * Symptomatic stenosis of left carotid artery [I65.22]     * Acute on chronic respiratory failure, unspecified whether with hypoxia or hypercapnia (CMS/HCC) [J96.20]     * Edema of larynx [J38.4]     * Hematoma of neck, sequela [S10.93XS] Post-op Diagnosis     * Symptomatic stenosis of left carotid artery [I65.22]     Procedures  Endarterectomy Carotid Artery  19413 - ME TEAEC W/PATCH GRF CAROTID VERTB SUBCLAV NECK INC      Surgeons      * Erickson Morgan - Primary    Resident/Fellow/Other Assistant:  Surgeon(s) and Role:     * Abbey Bullock MD - Assisting     * Nikolas Canales MD - Fellow     * Casandra Dawn MD - Fellow    Procedure Summary  Anesthesia: General  ASA: III  Anesthesia Staff: Anesthesiologist: Dorothy Peralta DO  CRNA: ELIOT Nicole-CRNA  C-AA: EDNA Meléndez  Estimated Blood Loss: 50 mL  Intra-op Medications:   Medication Name Total Dose   heparin (porcine) 5,000 Units in sodium chloride 0.9% 100 mL irrigation 5,000 Units   lactated Ringer's bolus 500 mL 500 mL   magnesium sulfate IV 4 g Cannot be calculated   aspirin chewable tablet 81 mg Cannot be calculated   atorvastatin (Lipitor) tablet 80 mg Cannot be calculated   benzocaine-menthol (Cepastat Sore Throat) 15-3.6 mg lozenge 1 lozenge Cannot be calculated   escitalopram (Lexapro) tablet 5 mg Cannot be calculated   heparin (porcine) injection 5,000 Units Cannot be calculated   hydroCHLOROthiazide (HYDRODiuril) tablet 25 mg Cannot be calculated   insulin lispro (HumaLOG) injection 0-5 Units Cannot be calculated   lactated Ringer's infusion 158.33 mL   propofol (Diprivan) infusion 290.98 mg              Anesthesia Record               Intraprocedure I/O Totals          Intake    Dexmedetomidine 0.00 mL    The total shown is the  total volume documented since Anesthesia Start was filed.    LR 1000.00 mL    Propofol Drip 0.00 mL    The total shown is the total volume documented since Anesthesia Start was filed.    Total Intake 1000 mL          Specimen: No specimens collected     Staff:   Circulator: Kristian Ordaz RN; Juan Carlos Peterson RN; Ирина Jacobo, RN  Scrub Person: Iliana Castano; Chary Ordaz         Drains and/or Catheters:   Closed/Suction Drain Left Neck Bulb (Active)   Site Description Other (Comment) 11/25/23 1421   Dressing Status Removed 11/25/23 1421   Drainage Appearance Bloody 11/25/23 0000   Status To bulb suction 11/25/23 0000   Output (mL) 25 mL 11/25/23 0000       [REMOVED] Urethral Catheter Non-latex 16 Fr. (Removed)   Site Assessment Clean;Skin intact 11/21/23 0800   Collection Container Standard drainage bag 11/21/23 0800   Securement Method Securing device (Describe) 11/20/23 2000   Reason for Continuing Urinary Catheterization surgical procedures: urological/gynecological, pelvic oncology, anal, prolonged surgical procedure 11/21/23 0800   Output (mL) 100 mL 11/21/23 0900       [REMOVED] Urethral Catheter Non-latex 16 Fr. (Removed)   Site Assessment Clean;Skin intact 11/23/23 0800   Collection Container Standard drainage bag 11/23/23 0800   Securement Method Securing device (Describe) 11/23/23 0800   Reason for Continuing Urinary Catheterization accurate hourly measurement of urine volume in a critically ill patient that cannot be assessed by other volumes and urine collection strategies 11/23/23 0800   Output (mL) 80 mL 11/23/23 1400       Tourniquet Times:         Implants:     Findings: Severe ICA stenosis     Indications: David Block is an 73 y.o. male who is having surgery for Symptomatic stenosis of left carotid artery [I65.22] in preparation for lung transplantation.    Procedure Details: After a plane of anesthesia was obtained, the patient was prepared and draped in the usual sterile fashion. A 5 cm long  incision was made along the anterior border of the left sternocleidomastoid muscle and the dissection carried through the subcutaneous tissue. The platysma was divided and the internal jugular vein retracted laterally. The facial vein was divided and the underlying carotid arteries identified. The ansa cervicalis was divided to facilitate the exposure of the arteries. The hypoglossal and the vagus nerves were identified and preserved. The glossopharyngeal nerve was visualized.    Following systermic heparinization, control was obtained of the internal, common and external carotid arteries in that order.  No SSEP/EEG changes were noted in the first 3 minutes. The internal carotid artery was transected at its origin and eversion endarterectomy effected in the usual fashion with a nice transition zone in the internal carotid artery. Endarterectomy of the common and external carotid arteries were performed in the standard fashion. The internal was then reimplanted onto its origin with a running 7-0 Prolene suture. After appropriate foreward bleeding and back-bleeding were allowed, flow was restored to the external and internal carotid arteries in that order. The anastomosis was hemostatic.     Heparin effects were reversed with Protamine.  After hemostasis was assured, platysma was approximated with a running 3-0 Vicryl, followed by skin closure with a 5-0 Monocroy.     The patient awoke in the operating room moving all four extremities and following commands.    Complications:  None; patient tolerated the procedure well.    Disposition: PACU - hemodynamically stable.  Condition: stable     Attending Attestation: I was present and scrubbed for the entire procedure.    Erickson Morgan  Phone Number: [phone]

## 2023-11-30 NOTE — OP NOTE
Endarterectomy Carotid Artery (L) Operative Note     Date: 2023  OR Location: [facility] OR    Name: David Block, : 1950, Age: 73 y.o., MRN: [id number], Sex: male    Diagnosis  Pre-op Diagnosis     * Symptomatic stenosis of left carotid artery [I65.22]     * Acute on chronic respiratory failure, unspecified whether with hypoxia or hypercapnia (CMS/HCC) [J96.20]     * Edema of larynx [J38.4]     * Hematoma of neck, sequela [S10.93XS] Post-op Diagnosis     * Symptomatic stenosis of left carotid artery [I65.22]     Procedures  Endarterectomy Carotid Artery  84087 - SD TEAEC W/PATCH GRF CAROTID VERTB SUBCLAV NECK INC      Surgeons      * Erickson Morgan - Primary    Resident/Fellow/Other Assistant:  Surgeon(s) and Role:     * Abbey Bullock MD - Assisting     * Nikolas Canales MD - Fellow     * Casandra Dawn MD - Fellow    Procedure Summary  Anesthesia: General  ASA: III  Anesthesia Staff: Anesthesiologist: Dorothy Peralta DO  CRNA: ELIOT Nicole-CRNA  C-AA: EDNA Meléndez  Estimated Blood Loss: 30 mL  Intra-op Medications:   Medication Name Total Dose   heparin (porcine) 5,000 Units in sodium chloride 0.9% 100 mL irrigation 5,000 Units   lactated Ringer's bolus 500 mL 500 mL   magnesium sulfate IV 4 g Cannot be calculated   aspirin chewable tablet 81 mg Cannot be calculated   atorvastatin (Lipitor) tablet 80 mg Cannot be calculated   benzocaine-menthol (Cepastat Sore Throat) 15-3.6 mg lozenge 1 lozenge Cannot be calculated   escitalopram (Lexapro) tablet 5 mg Cannot be calculated   heparin (porcine) injection 5,000 Units Cannot be calculated   hydroCHLOROthiazide (HYDRODiuril) tablet 25 mg Cannot be calculated   insulin lispro (HumaLOG) injection 0-5 Units Cannot be calculated   lactated Ringer's infusion 158.33 mL   propofol (Diprivan) infusion 290.98 mg              Anesthesia Record               Intraprocedure I/O Totals          Intake    Dexmedetomidine 0.00 mL    The total shown is the  total volume documented since Anesthesia Start was filed.    LR 1000.00 mL    Propofol Drip 0.00 mL    The total shown is the total volume documented since Anesthesia Start was filed.    Total Intake 1000 mL          Specimen: No specimens collected     Staff:   Circulator: Kristian Ordaz RN; Juan Carlos Peterson RN; Ирина Jacobo, RN  Scrub Person: Iliana Castano; Chary Ordaz         Drains and/or Catheters:   Closed/Suction Drain Left Neck Bulb (Active)   Site Description Other (Comment) 11/25/23 1421   Dressing Status Removed 11/25/23 1421   Drainage Appearance Bloody 11/25/23 0000   Status To bulb suction 11/25/23 0000   Output (mL) 25 mL 11/25/23 0000       [REMOVED] Urethral Catheter Non-latex 16 Fr. (Removed)   Site Assessment Clean;Skin intact 11/21/23 0800   Collection Container Standard drainage bag 11/21/23 0800   Securement Method Securing device (Describe) 11/20/23 2000   Reason for Continuing Urinary Catheterization surgical procedures: urological/gynecological, pelvic oncology, anal, prolonged surgical procedure 11/21/23 0800   Output (mL) 100 mL 11/21/23 0900       [REMOVED] Urethral Catheter Non-latex 16 Fr. (Removed)   Site Assessment Clean;Skin intact 11/23/23 0800   Collection Container Standard drainage bag 11/23/23 0800   Securement Method Securing device (Describe) 11/23/23 0800   Reason for Continuing Urinary Catheterization accurate hourly measurement of urine volume in a critically ill patient that cannot be assessed by other volumes and urine collection strategies 11/23/23 0800   Output (mL) 80 mL 11/23/23 1400       Tourniquet Times:         Implants:     Findings: Severe ICA stenosis    Indications: David Block is an 73 y.o. male who is having surgery for Symptomatic stenosis of left carotid artery [I65.22] in preparation for lung transplantation.    Procedure Details:   After a plane of anesthesia was obtained, the patient was prepared and draped in the usual sterile fashion. A 5 cm long  incision was made along the anterior border of the left sternocleidomastoid muscle and the dissection carried through the subcutaneous tissue. The platysma was divided and the internal jugular vein retracted laterally. The facial vein was divided and the underlying carotid arteries identified. The ansa cervicalis was divided to facilitate the exposure of the arteries. The hypoglossal and the vagus nerves were identified and preserved. The glossopharyngeal nerve was not visualized.    Following systermic heparinization, control was obtained of the internal, common and external carotid arteries in that order. No SSEP/EEG changes were noted in the first 3 minutes/SSEP/EEG. The internal carotid artery was transected at its origin and eversion endarterectomy effected in the usual fashion with a nice transition zone in the internal carotid artery. Endarterectomy of the common and external carotid arteries were performed in the standard fashion. The internal was then reimplanted onto its origin with a running 7-0 Prolene suture. After appropriate foreward bleeding and back-bleeding were allowed, flow was restored to the external and internal carotid arteries in that order. The anastomosis was hemostatic.     Heparin effects were reversed with Protamine.  After hemostasis was assured, platysma was approximated with a running 3-0 Vicryl, followed by skin closure with a 5-0 Monocroy.     The patient awoke in the operating room moving all four extremities and following commands.    Complications:  None; patient tolerated the procedure well.    Disposition: PACU - hemodynamically stable.  Condition: stable     Attending Attestation: I was present and scrubbed for the entire procedure.    Erickson Morgan  Phone Number: [phone]

## 2023-12-05 ENCOUNTER — TELEPHONE (OUTPATIENT)
Dept: PULMONOLOGY | Facility: HOSPITAL | Age: 73
End: 2023-12-05
Payer: MEDICARE

## 2023-12-05 LAB
LABORATORY COMMENT REPORT: NORMAL
PATH REPORT.FINAL DX SPEC: NORMAL
PATH REPORT.GROSS SPEC: NORMAL
PATH REPORT.RELEVANT HX SPEC: NORMAL
PATH REPORT.TOTAL CANCER: NORMAL

## 2023-12-06 ENCOUNTER — TELEPHONE (OUTPATIENT)
Dept: TRANSPLANT | Facility: HOSPITAL | Age: 73
End: 2023-12-06
Payer: MEDICARE

## 2023-12-06 NOTE — TELEPHONE ENCOUNTER
Pt. Called asking if we have received his labs from his primary care's office. Please call patient back and advise.

## 2023-12-06 NOTE — TELEPHONE ENCOUNTER
Returned patient's call. He states he got labs about a week ago at a local [facility] location. He will get the phone number for the place and call back with info.

## 2023-12-06 NOTE — TELEPHONE ENCOUNTER
Received call back from David, he provided the following information:    [facility] Lab Results number: [phone]  Labs ordered by [facility]    Called [facility] lab and requested results from last week. They only have a hepatic panel. Provided fax number for transplant office.

## 2023-12-06 NOTE — TELEPHONE ENCOUNTER
Talked to Rusty from Dr. Albert's office to give update on lung transplant progress. Faxed procedure notes to the office.

## 2023-12-12 ENCOUNTER — LAB (OUTPATIENT)
Dept: LAB | Facility: LAB | Age: 73
End: 2023-12-12
Payer: MEDICARE

## 2023-12-12 ENCOUNTER — APPOINTMENT (OUTPATIENT)
Dept: TRANSPLANT | Facility: HOSPITAL | Age: 73
End: 2023-12-12
Payer: MEDICARE

## 2023-12-12 ENCOUNTER — OFFICE VISIT (OUTPATIENT)
Dept: TRANSPLANT | Facility: HOSPITAL | Age: 73
End: 2023-12-12
Payer: MEDICARE

## 2023-12-12 VITALS
OXYGEN SATURATION: 86 % | TEMPERATURE: 97.9 F | WEIGHT: 199.5 LBS | SYSTOLIC BLOOD PRESSURE: 112 MMHG | HEART RATE: 92 BPM | DIASTOLIC BLOOD PRESSURE: 69 MMHG | BODY MASS INDEX: 31.25 KG/M2

## 2023-12-12 DIAGNOSIS — J84.9 INTERSTITIAL LUNG DISEASE (MULTI): ICD-10-CM

## 2023-12-12 DIAGNOSIS — Z01.818 PRE-TRANSPLANT EVALUATION FOR LUNG TRANSPLANT: Primary | ICD-10-CM

## 2023-12-12 DIAGNOSIS — Z76.82 AWAITING ORGAN TRANSPLANT: ICD-10-CM

## 2023-12-12 LAB
ABO GROUP (TYPE) IN BLOOD: NORMAL
ALBUMIN SERPL BCP-MCNC: 4.6 G/DL (ref 3.4–5)
ALP SERPL-CCNC: 89 U/L (ref 33–136)
ALT SERPL W P-5'-P-CCNC: 34 U/L (ref 10–52)
AST SERPL W P-5'-P-CCNC: 24 U/L (ref 9–39)
BILIRUB DIRECT SERPL-MCNC: 0.2 MG/DL (ref 0–0.3)
BILIRUB SERPL-MCNC: 1.1 MG/DL (ref 0–1.2)
PROT SERPL-MCNC: 7.3 G/DL (ref 6.4–8.2)
RH FACTOR (ANTIGEN D): NORMAL

## 2023-12-12 PROCEDURE — 99215 OFFICE O/P EST HI 40 MIN: CPT

## 2023-12-12 PROCEDURE — 86900 BLOOD TYPING SEROLOGIC ABO: CPT

## 2023-12-12 PROCEDURE — 80076 HEPATIC FUNCTION PANEL: CPT

## 2023-12-12 PROCEDURE — 86901 BLOOD TYPING SEROLOGIC RH(D): CPT

## 2023-12-12 PROCEDURE — 36415 COLL VENOUS BLD VENIPUNCTURE: CPT

## 2023-12-12 ASSESSMENT — PAIN SCALES - GENERAL: PAINLEVEL: 0-NO PAIN

## 2023-12-12 NOTE — PROGRESS NOTES
SOCIAL SUPPORT:  Mr. Block's primary social support will be his wife of 23 years, Tosha. Tosha is enthusiastic, engaged, and physically present. She asks thoughtful, appropriate questions that demonstrate her understanding of the transplant process. Tosha is a retired , she has good reliable transportation and is comfortable driving to [facility].     We discussed the importance of frequent, daily education with the Transplant team and nursing staff. We discussed that frequent, daily education will foster overall success and harness a deeper understanding of transplant medications, infection prevention, and increasing Mr. Block's level of activity.      Tosha has excellent support herself in the form of her sister Debbi and brother-in-law Jasson. Tosha has no other commitments to care for others except her pets which she can rely on friends to care for while Mr. Block is hospitalized.     We discussed the hospitalization at length including the possibility of a dry run, CTICU admission, central and arterial access, chest tubes, sedation medications, vasoactive medications, daily CXR and bloodwork. Both verbalized their understanding and all questions answered.     We discussed the importance of psychosocial support in overall transplant success including attending all appointments, adjusting medications as instructed by transplant team, keeping all prescriptions current, having adequate supply of medications, calling the transplant office/on-call line for any new symptoms regardless of how small.      Tosha demonstrated her understanding of all of these topics. She verbalized and demonstrated her enthusiasm and readiness to support Mr. Block through his transplant process.     Social Support Excellent    FRAILTY:  Chair Raises: 1 point (24.32 seconds for 5 chair raises)  Cognitive Impairment: 0 points (MMSE score 29)  HgB: 0 points (HgB 15.2 on 11/25/23)  Albumin: 0 points (Albumin 4.7 on 9/29/23)    EFT  Score 1

## 2023-12-13 ENCOUNTER — LAB (OUTPATIENT)
Dept: LAB | Facility: LAB | Age: 73
End: 2023-12-13
Payer: MEDICARE

## 2023-12-13 ENCOUNTER — PATIENT OUTREACH (OUTPATIENT)
Dept: HEMATOLOGY/ONCOLOGY | Facility: HOSPITAL | Age: 73
End: 2023-12-13
Payer: MEDICARE

## 2023-12-13 DIAGNOSIS — Z76.82 AWAITING ORGAN TRANSPLANT: ICD-10-CM

## 2023-12-13 LAB
CREAT SERPL-MCNC: 0.98 MG/DL (ref 0.5–1.3)
GFR SERPL CREATININE-BSD FRML MDRD: 82 ML/MIN/1.73M*2

## 2023-12-13 PROCEDURE — 82575 CREATININE CLEARANCE TEST: CPT

## 2023-12-13 PROCEDURE — 81050 URINALYSIS VOLUME MEASURE: CPT

## 2023-12-13 PROCEDURE — 82565 ASSAY OF CREATININE: CPT

## 2023-12-13 PROCEDURE — 36415 COLL VENOUS BLD VENIPUNCTURE: CPT

## 2023-12-13 NOTE — PROGRESS NOTES
"12/13/2023 11:30AM Referral received from Dr. Cabrera through the transplant team. Patient is being evlauated for a lung transplant. He recently underwent a cysto/TUR for a 1-2mm lesion, biopsy performed on 11/02/2023. Revealed, \"bladder neoplasm of uncertain malignant potential\". Patient is now referred to discuss these results with  oncologist. Appointment confirmed with patient for 12/19/2023 with Dr. Urena. Outside info will be forwarded to team once received. Vel Swift RN.   "

## 2023-12-14 DIAGNOSIS — Z01.818 PRE-TRANSPLANT EVALUATION FOR LUNG TRANSPLANT: Primary | ICD-10-CM

## 2023-12-14 LAB
COLLECT DURATION TIME SPEC: 24 HRS
CREAT 24H UR-MCNC: 47.6 MG/DL (ref 20–370)
CREAT 24H UR-MRATE: 2.09 G/24 H (ref 0.87–2.41)
CREAT CL 24H UR+SERPL-VRATE: 148 ML/MIN
SPECIMEN VOL 24H UR: 4400 ML

## 2023-12-14 NOTE — TELEPHONE ENCOUNTER
Called Mr. Block at 9:39am on 12/14/23. Advised that his hepatitis B vaccination is a series, not a single vaccination. Verbalized his understanding, all questions answered. Our office will be in touch following his Oncology appt 12/19/23.

## 2023-12-14 NOTE — DOCUMENTATION CLARIFICATION NOTE
"    PATIENT:               TENA ARMENDARIZ  ACCT #:                  [id number]  MRN:                       [id number]  :                       1950  ADMIT DATE:       2023 8:40 AM  DISCH DATE:        2023 10:48 AM  RESPONDING PROVIDER #:        [id number]          PROVIDER RESPONSE TEXT:    Acute Respiratory Failure ruled out after further workup, Chronic Respiratory Failure due to idiopathic pulmonary fibrosis    CDI QUERY TEXT:    UH_CV Respiratory    Instruction:    Based on your assessment of the patient and the clinical information, please provide the requested documentation by clicking on the appropriate radio button and enter any additional information if prompted.    Question: Please clarify diagnosis of respiratory failure as    When answering this query, please exercise your independent professional judgment. The fact that a question is being asked, does not imply that any particular answer is desired or expected.    The patient's clinical indicators include:  Clinical Information:  23 H&P Dr. Sadler  \"73 y.o. RHD male here for initial evaluation. He has idiopathic pulmonary fibrosis and is being evaluated for a lung transplant. He was found to have severe left carotid artery stenosis on recent carotid duplex ultrasound with a PSV/EDV of 649/311 and a ratio of 9.7. He reports left eye \"blurriness\" that started a month ago. These episodes occur once a day and last less than 30 seconds in duration. He states the blurriness resolves after he blinks a few times. He denies unilateral weakness or speech impairments. He has no history of neck surgery or radiation to the neck. \"    Documented Diagnosis:  23 OP Note Dr. Morgan \"Acute on Chronic Respiratory Failure, unspecified whether with hypoxia or hypercapnia\"    23 Anesthesia Start time 1100  Stop Time 1459    Clinical Indicators and Documentation:  -Vital Signs: 23 0905 36.4-67-/70 98% 3L  -ABG results:  1257  " "7.24-, 11/20 1543 7.34-44-80, 11/20 2011 7.37-37-92  -Physical Exam Findings: 11/20/23 H&P Dr. Sadler  -Breath sounds: unlabored breathing  -Distress: no distress recorded  -Cyanosis: no cyanosis recorded  -Oxygen Therapy: supplemental  -Pulmonary Consult: n/a    11/20/23 H&P Dr. Sadler  \"He has idiopathic pulmonary fibrosis and is being evaluated for a lung transplant  He does have dyspnea with exertion and wears oxygen as needed  RESP: Unlabored breathing\"?    11/20/23 Med Crit PN EMILY Cook CNP  \"on 4L NC with activity at home, 2L with rest PRN, no O2 at night\"    Treatment:  supplemental oxygen  intubated and extubated for OR procedure    Risk Factors: idiopathic pulmonary fibrosis, history of tobacco use  Options provided:  -- Acute Respiratory Failure ruled out after further workup, Chronic Respiratory Failure due to idiopathic pulmonary fibrosis  -- Acute Respiratory Failure ruled out, intubation for airway protection,  Chronic Respiratory Failure due to idiopathic pulmonary fibrosis  -- Other - I will add my own diagnosis  -- Refer to Clinical Documentation Reviewer    Query created by: Angélica Galvez on 12/11/2023 12:56 PM      Electronically signed by:  RAISSA TERRAZAS MD 12/14/2023 8:05 AM          "

## 2023-12-15 ENCOUNTER — APPOINTMENT (OUTPATIENT)
Dept: TRANSPLANT | Facility: HOSPITAL | Age: 73
End: 2023-12-15
Payer: MEDICARE

## 2023-12-18 NOTE — DOCUMENTATION CLARIFICATION NOTE
"    PATIENT:               TENA ARMENDARIZ  ACCT #:                  [id number]  MRN:                       [id number]  :                       1950  ADMIT DATE:       2023 8:40 AM  DISCH DATE:        2023 10:48 AM  RESPONDING PROVIDER #:        [id number]          PROVIDER RESPONSE TEXT:    Evacuation of left neck hematoma from subcutaneous tissue and fascia    CDI QUERY TEXT:    UH_Debridement    Instruction:    Based on your assessment of the patient and the clinical information, please provide the requested documentation by clicking on the appropriate radio button and enter any additional information if prompted.    Question: Please further clarify the debridement procedure as      When answering this query, please exercise your independent professional judgment. The fact that a question is being asked, does not imply that any particular answer is desired or expected.    The patient's clinical indicators include:  Clinical Information: This query refers to the procedure performed on 23 and documented as Left neck hematoma evacuation and washout; No Op report at this time.    23 Brief OP Note: \"Left neck hematoma evacuation and washout\"    \"Findings: Approximately 50 mL of old clot and hematoma evacuated. No active bleeding.\"  Options provided:  -- Evacuation of left neck hematoma from skin  -- Evacuation of left neck hematoma from subcutaneous tissue and fascia  -- Evacuation of left neck hematoma from muscle  -- Other - I will add my own diagnosis  -- Refer to Clinical Documentation Reviewer    Query created by: Angélica Galvez on 12/15/2023 10:31 AM      Electronically signed by:  CORINA KOCH MD 2023 10:33 AM          "

## 2023-12-18 NOTE — PROGRESS NOTES
"Patient ID: David Block is a 73 y.o. male.  Diagnosis: idiopathic pulmonary fibrosis for lung transplantation.     HISTORY OF PRESENT ILLNESS:  Referral received from Dr. Cabrera through the transplant team.   Pt has idiopathic pulmonary fibrosis and had surgery for symptomatic stenosis of left carotid artery in preparation for lung transplantation.   He recently underwent a cysto/TUR for a 1-2mm lesion, biopsy performed on 11/02/2023. Revealed, \"bladder neoplasm of uncertain malignant potential\". Patient is now referred to discuss these results with  oncologist. Appointment confirmed with patient for 12/19/2023 with Dr. Urena. Outside info will be forwarded to team once received. Vel Swift RN.      Surgical History:  David has a past surgical history that includes CT angio neck (11/13/2023); Knee surgery; Cystoscopy; Colonoscopy; and Back surgery.    Social History:  David reports that he quit smoking about 13 years ago. His smoking use included cigarettes. He has never used smokeless tobacco.    Family History:    Family History   Problem Relation Name Age of Onset    Heart attack Father           OBJECTIVE:    VS / Pain:  There were no vitals taken for this visit.  BSA: There is no height or weight on file to calculate BSA.    Performance Status:  ECOG Score: 0- Fully active, able to carry on all pre-disease performance w/o restriction.    Diagnostic Results   @=== 11/10/23 ===    CT ANGIO NECK W    - Impression -  1. The left internal carotid artery has greater than 95% stenosis in  the carotid bulb.    2. The right internal carotid artery has 80% stenosis by irregular  plaque at its origin.    MACRO:  Critical Finding:  See findings. Notification was initiated on  11/13/2023 at 4:20 pm by  John Montero.  (**-OCF-**) Instructions:    Signed by: John Montero 11/13/2023 4:21 PM  Dictation workstation:   [id number]     9/20/23 -   IMPRESSION:  1. Large soft tissue mass in the region of right prostate " gland  measuring 4.8 cm. Further evaluation with serum PSA and pelvic  ultrasound is recommended. If PSA is elevated, MRI of the prostate  can be obtained. Differential diagnosis include primary prostatic  mass versus large complex right seminal vesicle cyst  2. Urinary bladder is overdistended, could be related to urethral  obstruction from the right prostatic mass as described above.  3. Stable pulmonary findings consistent with pulmonary fibrosis,  better visualized on high-resolution chest CT from 08/04/2023.  4. Moderate calcified atherosclerotic changes of the abdominal aorta  and its major branching vessels.    11/2/23 TURBT -   Urothelial proliferation with atypia / unknown sig  No invasive carcinoma.     Assessment/Plan   I personally saw patient and counselled all visit  on his diagnosis, alban history and coordination of his care.   This is a 73 y.o. male hx of idiopathic pulmonary fibrosis being considered for lung transplantation. He was found with large soft tissue mass in the region of right prostate gland measuring 4.8 cm with normal PSA 09/2023. Will proceed with MRI prostate and see. Cysto/TURBT no cancer.   Thank you for the opportunity to be involved in the care of David Block. Please do not hesitate to reach out with any questions. Thank you.   -------------------------------------------------------------------------------------------------------------------------------  Latrell Urena MD, Msc, FACP  Co-Leader Genitourinary () Disease Team  Director of  Medical Oncology Research Program   [facility] [place]  Associate Professor of Medicine  [address], [address]  [place] [address]  Phone: [phone]  [email or web address]\"".[facility]

## 2023-12-19 ENCOUNTER — OFFICE VISIT (OUTPATIENT)
Dept: HEMATOLOGY/ONCOLOGY | Facility: HOSPITAL | Age: 73
End: 2023-12-19
Payer: MEDICARE

## 2023-12-19 ENCOUNTER — LAB (OUTPATIENT)
Dept: LAB | Facility: HOSPITAL | Age: 73
End: 2023-12-19
Payer: MEDICARE

## 2023-12-19 VITALS
TEMPERATURE: 97.2 F | HEIGHT: 67 IN | OXYGEN SATURATION: 92 % | HEART RATE: 84 BPM | BODY MASS INDEX: 30.93 KG/M2 | DIASTOLIC BLOOD PRESSURE: 72 MMHG | RESPIRATION RATE: 18 BRPM | WEIGHT: 197.09 LBS | SYSTOLIC BLOOD PRESSURE: 127 MMHG

## 2023-12-19 DIAGNOSIS — N42.9 PROSTATE DISORDER WITH LOWER URINARY TRACT SYMPTOMS: ICD-10-CM

## 2023-12-19 DIAGNOSIS — N42.89 PROSTATE MASS: Primary | ICD-10-CM

## 2023-12-19 DIAGNOSIS — R39.9 PROSTATE DISORDER WITH LOWER URINARY TRACT SYMPTOMS: ICD-10-CM

## 2023-12-19 DIAGNOSIS — Z01.818 PRE-TRANSPLANT EVALUATION FOR LUNG TRANSPLANT: ICD-10-CM

## 2023-12-19 DIAGNOSIS — N42.89 PROSTATE MASS: ICD-10-CM

## 2023-12-19 DIAGNOSIS — Z13.820 ENCOUNTER FOR SCREENING FOR OSTEOPOROSIS: ICD-10-CM

## 2023-12-19 LAB
ABO GROUP (TYPE) IN BLOOD: NORMAL
ALBUMIN SERPL BCP-MCNC: 4.7 G/DL (ref 3.4–5)
ALP SERPL-CCNC: 85 U/L (ref 33–136)
ALT SERPL W P-5'-P-CCNC: 35 U/L (ref 10–52)
ANION GAP SERPL CALC-SCNC: 15 MMOL/L (ref 10–20)
AST SERPL W P-5'-P-CCNC: 24 U/L (ref 9–39)
BASOPHILS # BLD AUTO: 0.1 X10*3/UL (ref 0–0.1)
BASOPHILS NFR BLD AUTO: 0.7 %
BILIRUB SERPL-MCNC: 0.9 MG/DL (ref 0–1.2)
BUN SERPL-MCNC: 13 MG/DL (ref 6–23)
CALCIUM SERPL-MCNC: 10.3 MG/DL (ref 8.6–10.3)
CHLORIDE SERPL-SCNC: 101 MMOL/L (ref 98–107)
CO2 SERPL-SCNC: 25 MMOL/L (ref 21–32)
CREAT SERPL-MCNC: 0.91 MG/DL (ref 0.5–1.3)
EOSINOPHIL # BLD AUTO: 0.24 X10*3/UL (ref 0–0.4)
EOSINOPHIL NFR BLD AUTO: 1.8 %
ERYTHROCYTE [DISTWIDTH] IN BLOOD BY AUTOMATED COUNT: 14.1 % (ref 11.5–14.5)
GFR SERPL CREATININE-BSD FRML MDRD: 90 ML/MIN/1.73M*2
GLUCOSE SERPL-MCNC: 94 MG/DL (ref 74–99)
HCT VFR BLD AUTO: 53.4 % (ref 41–52)
HGB BLD-MCNC: 18.1 G/DL (ref 13.5–17.5)
IMM GRANULOCYTES # BLD AUTO: 0.12 X10*3/UL (ref 0–0.5)
IMM GRANULOCYTES NFR BLD AUTO: 0.9 % (ref 0–0.9)
LYMPHOCYTES # BLD AUTO: 2.79 X10*3/UL (ref 0.8–3)
LYMPHOCYTES NFR BLD AUTO: 20.8 %
MCH RBC QN AUTO: 31.4 PG (ref 26–34)
MCHC RBC AUTO-ENTMCNC: 33.9 G/DL (ref 32–36)
MCV RBC AUTO: 93 FL (ref 80–100)
MONOCYTES # BLD AUTO: 1.5 X10*3/UL (ref 0.05–0.8)
MONOCYTES NFR BLD AUTO: 11.2 %
NEUTROPHILS # BLD AUTO: 8.69 X10*3/UL (ref 1.6–5.5)
NEUTROPHILS NFR BLD AUTO: 64.6 %
NRBC BLD-RTO: 0 /100 WBCS (ref 0–0)
PLATELET # BLD AUTO: 216 X10*3/UL (ref 150–450)
POTASSIUM SERPL-SCNC: 4.3 MMOL/L (ref 3.5–5.3)
PROT SERPL-MCNC: 7.6 G/DL (ref 6.4–8.2)
PSA SERPL-MCNC: 2.84 NG/ML
RBC # BLD AUTO: 5.76 X10*6/UL (ref 4.5–5.9)
RH FACTOR (ANTIGEN D): NORMAL
SODIUM SERPL-SCNC: 137 MMOL/L (ref 136–145)
WBC # BLD AUTO: 13.4 X10*3/UL (ref 4.4–11.3)

## 2023-12-19 PROCEDURE — 99204 OFFICE O/P NEW MOD 45 MIN: CPT | Performed by: STUDENT IN AN ORGANIZED HEALTH CARE EDUCATION/TRAINING PROGRAM

## 2023-12-19 PROCEDURE — 1159F MED LIST DOCD IN RCRD: CPT | Performed by: STUDENT IN AN ORGANIZED HEALTH CARE EDUCATION/TRAINING PROGRAM

## 2023-12-19 PROCEDURE — 85025 COMPLETE CBC W/AUTO DIFF WBC: CPT

## 2023-12-19 PROCEDURE — 99214 OFFICE O/P EST MOD 30 MIN: CPT | Performed by: STUDENT IN AN ORGANIZED HEALTH CARE EDUCATION/TRAINING PROGRAM

## 2023-12-19 PROCEDURE — 36415 COLL VENOUS BLD VENIPUNCTURE: CPT

## 2023-12-19 PROCEDURE — 84075 ASSAY ALKALINE PHOSPHATASE: CPT

## 2023-12-19 PROCEDURE — 1036F TOBACCO NON-USER: CPT | Performed by: STUDENT IN AN ORGANIZED HEALTH CARE EDUCATION/TRAINING PROGRAM

## 2023-12-19 PROCEDURE — 1111F DSCHRG MED/CURRENT MED MERGE: CPT | Performed by: STUDENT IN AN ORGANIZED HEALTH CARE EDUCATION/TRAINING PROGRAM

## 2023-12-19 PROCEDURE — 1126F AMNT PAIN NOTED NONE PRSNT: CPT | Performed by: STUDENT IN AN ORGANIZED HEALTH CARE EDUCATION/TRAINING PROGRAM

## 2023-12-19 PROCEDURE — 84402 ASSAY OF FREE TESTOSTERONE: CPT

## 2023-12-19 PROCEDURE — 84153 ASSAY OF PSA TOTAL: CPT | Performed by: STUDENT IN AN ORGANIZED HEALTH CARE EDUCATION/TRAINING PROGRAM

## 2023-12-19 PROCEDURE — 3078F DIAST BP <80 MM HG: CPT | Performed by: STUDENT IN AN ORGANIZED HEALTH CARE EDUCATION/TRAINING PROGRAM

## 2023-12-19 PROCEDURE — 3074F SYST BP LT 130 MM HG: CPT | Performed by: STUDENT IN AN ORGANIZED HEALTH CARE EDUCATION/TRAINING PROGRAM

## 2023-12-19 PROCEDURE — 86901 BLOOD TYPING SEROLOGIC RH(D): CPT

## 2023-12-19 ASSESSMENT — COLUMBIA-SUICIDE SEVERITY RATING SCALE - C-SSRS
6. HAVE YOU EVER DONE ANYTHING, STARTED TO DO ANYTHING, OR PREPARED TO DO ANYTHING TO END YOUR LIFE?: NO
1. IN THE PAST MONTH, HAVE YOU WISHED YOU WERE DEAD OR WISHED YOU COULD GO TO SLEEP AND NOT WAKE UP?: NO
2. HAVE YOU ACTUALLY HAD ANY THOUGHTS OF KILLING YOURSELF?: NO

## 2023-12-19 ASSESSMENT — PATIENT HEALTH QUESTIONNAIRE - PHQ9
2. FEELING DOWN, DEPRESSED OR HOPELESS: NOT AT ALL
1. LITTLE INTEREST OR PLEASURE IN DOING THINGS: NOT AT ALL
SUM OF ALL RESPONSES TO PHQ9 QUESTIONS 1 AND 2: 0

## 2023-12-19 ASSESSMENT — PAIN SCALES - GENERAL: PAINLEVEL: 0-NO PAIN

## 2023-12-21 NOTE — OP NOTE
Endarterectomy Carotid Artery (L) Operative Note     Date: 2023  OR Location: [facility] OR    Name: David Block : 1950, Age: 73 y.o., MRN: [id number], Sex: male    Diagnosis  Pre-op Diagnosis     * Symptomatic stenosis of left carotid artery [I65.22]     * Acute on chronic respiratory failure, unspecified whether with hypoxia or hypercapnia (CMS/HCC) [J96.20]     * Edema of larynx [J38.4]     * Hematoma of neck, sequela [S10.93XS] Post-op Diagnosis     * Symptomatic stenosis of left carotid artery [I65.22]     Procedures  Left neck hematoma evacuation to muscle level      Surgeons   Abbey Bullock MD    Resident/Fellow/Other Assistant:  Nikolas Canales MD    Procedure Summary  Anesthesia: General  ASA: III  Anesthesia Staff: Anesthesiologist: Dorothy Peralta DO  CRNA: Jose Alonso, APRN-CRNA  C-AA: EDNA Meléndez  Estimated Blood Loss: 50 mL  Intra-op Medications:   Medication Name Total Dose   heparin (porcine) 5,000 Units in sodium chloride 0.9% 100 mL irrigation 5,000 Units   lactated Ringer's bolus 500 mL 500 mL   magnesium sulfate IV 4 g Cannot be calculated   aspirin chewable tablet 81 mg Cannot be calculated   atorvastatin (Lipitor) tablet 80 mg Cannot be calculated   benzocaine-menthol (Cepastat Sore Throat) 15-3.6 mg lozenge 1 lozenge Cannot be calculated   escitalopram (Lexapro) tablet 5 mg Cannot be calculated   heparin (porcine) injection 5,000 Units Cannot be calculated   hydroCHLOROthiazide (HYDRODiuril) tablet 25 mg Cannot be calculated   insulin lispro (HumaLOG) injection 0-5 Units Cannot be calculated   lactated Ringer's infusion 158.33 mL   propofol (Diprivan) infusion 290.98 mg              Anesthesia Record               Intraprocedure I/O Totals          Intake    Dexmedetomidine 0.00 mL    The total shown is the total volume documented since Anesthesia Start was filed.    LR 1000.00 mL    Propofol Drip 0.00 mL    The total shown is the total volume documented since  Anesthesia Start was filed.    Total Intake 1000 mL          Specimen: No specimens collected     Staff:   Circulator: Kristian Ordaz RN; Juan Carlos Peterson RN; Ирина Jacobo RN  Scrub Person: Iliana Castano; Chary Ordaz         Drains and/or Catheters:   Closed/Suction Drain Left Neck Bulb (Active)   Site Description Other (Comment) 11/25/23 1421   Dressing Status Removed 11/25/23 1421   Drainage Appearance Bloody 11/25/23 0000   Status To bulb suction 11/25/23 0000   Output (mL) 25 mL 11/25/23 0000       [REMOVED] Urethral Catheter Non-latex 16 Fr. (Removed)   Site Assessment Clean;Skin intact 11/23/23 0800   Collection Container Standard drainage bag 11/23/23 0800   Securement Method Securing device (Describe) 11/23/23 0800   Reason for Continuing Urinary Catheterization accurate hourly measurement of urine volume in a critically ill patient that cannot be assessed by other volumes and urine collection strategies 11/23/23 0800   Output (mL) 80 mL 11/23/23 1400       Tourniquet Times:         Implants:     Findings: Old hematoma, no active bleeding    Indications: David Block is an 73 y.o. male who is having surgery for Symptomatic stenosis of left carotid artery [I65.22]. Patient had undergone left carotid endarterectomy several days prior. Was noted to have incisional neck swelling postoperatively. Speech and ENT evaluated at bedside with concern for vocal cord edema and potential airway issues. He was taken emergently to the operating room for hematoma evacuation.    The patient was seen in the preoperative area. The risks, benefits, complications, treatment options, non-operative alternatives, expected recovery and outcomes were discussed with the patient. The possibilities of reaction to medication, pulmonary aspiration, injury to surrounding structures, bleeding, recurrent infection, the need for additional procedures, failure to diagnose a condition, and creating a complication requiring transfusion or  operation were discussed with the patient. The patient concurred with the proposed plan, giving informed consent.  The site of surgery was properly noted/marked if necessary per policy. The patient has been actively warmed in preoperative area. Preoperative antibiotics have been ordered and given within 1 hours of incision. Venous thrombosis prophylaxis have been ordered including bilateral sequential compression devices    Procedure Details:   #15 blade used to re-enter the previous left neck incision with removal of sutures. There was 50 ml of old hematoma which was evacuated. Wound cavity copiously irrigated. The carotid patch was inspected and noted to be hemostatic. There was a small amount of bleeding from the SCM muscle but likely due to re-entry and not the source of the previous hematoma. Multiple hemostatic agents were applied. A 19 F jacklyn drain was left in place in subplatysmal plane. Platsyma and skin closed.  Complications:  None; patient tolerated the procedure well.    Disposition: PACU - hemodynamically stable.  Condition: stable             Attending Attestation: I was present and scrubbed for the entire procedure.    Erickson Morgan  Phone Number: [phone]

## 2023-12-22 ENCOUNTER — HOSPITAL ENCOUNTER (OUTPATIENT)
Dept: VASCULAR MEDICINE | Facility: HOSPITAL | Age: 73
Discharge: HOME | End: 2023-12-22
Payer: MEDICARE

## 2023-12-22 ENCOUNTER — OFFICE VISIT (OUTPATIENT)
Dept: VASCULAR SURGERY | Facility: HOSPITAL | Age: 73
End: 2023-12-22
Payer: MEDICARE

## 2023-12-22 VITALS
WEIGHT: 200.8 LBS | DIASTOLIC BLOOD PRESSURE: 66 MMHG | HEIGHT: 67 IN | BODY MASS INDEX: 31.52 KG/M2 | HEART RATE: 99 BPM | SYSTOLIC BLOOD PRESSURE: 99 MMHG

## 2023-12-22 DIAGNOSIS — I65.23 CAROTID STENOSIS, ASYMPTOMATIC, BILATERAL: Primary | ICD-10-CM

## 2023-12-22 DIAGNOSIS — I65.23 OCCLUSION AND STENOSIS OF BILATERAL CAROTID ARTERIES: ICD-10-CM

## 2023-12-22 DIAGNOSIS — I65.22 CAROTID STENOSIS, SYMPTOMATIC W/O INFARCT, LEFT: ICD-10-CM

## 2023-12-22 PROCEDURE — 1126F AMNT PAIN NOTED NONE PRSNT: CPT | Performed by: SURGERY

## 2023-12-22 PROCEDURE — 93880 EXTRACRANIAL BILAT STUDY: CPT | Performed by: INTERNAL MEDICINE

## 2023-12-22 PROCEDURE — 1036F TOBACCO NON-USER: CPT | Performed by: SURGERY

## 2023-12-22 PROCEDURE — 3078F DIAST BP <80 MM HG: CPT | Performed by: SURGERY

## 2023-12-22 PROCEDURE — 3074F SYST BP LT 130 MM HG: CPT | Performed by: SURGERY

## 2023-12-22 PROCEDURE — 1111F DSCHRG MED/CURRENT MED MERGE: CPT | Performed by: SURGERY

## 2023-12-22 PROCEDURE — 93880 EXTRACRANIAL BILAT STUDY: CPT

## 2023-12-22 PROCEDURE — 1159F MED LIST DOCD IN RCRD: CPT | Performed by: SURGERY

## 2023-12-22 PROCEDURE — 99024 POSTOP FOLLOW-UP VISIT: CPT | Performed by: SURGERY

## 2023-12-22 ASSESSMENT — ENCOUNTER SYMPTOMS
OCCASIONAL FEELINGS OF UNSTEADINESS: 0
DEPRESSION: 0
LOSS OF SENSATION IN FEET: 0

## 2023-12-22 NOTE — PROGRESS NOTES
"History Of Present Illness  David Block is a 73 y.o. male who presents in follow-up of left carotid endarterectomy. He is without any complaints.  He is on ASA only.   He had a duplex today which showed widely patent carotid artery on the left.     Past Medical History  He has a past medical history of BPH (benign prostatic hyperplasia), Cancer of cheek (CMS/HCC), Hyperlipidemia, Hypertension, and Idiopathic pulmonary fibrosis (CMS/HCC).    Surgical History  He has a past surgical history that includes CT angio neck (11/13/2023); Knee surgery; Cystoscopy; Colonoscopy; and Back surgery.     Social History  He reports that he quit smoking about 13 years ago. His smoking use included cigarettes. He has never used smokeless tobacco. No history on file for alcohol use and drug use.    Family History  Family History   Problem Relation Name Age of Onset    Heart attack Father          Allergies  Nifedipine    Review of Systems  Non contributory    Physical Exam  Gen: alert and awake. Oriented to time, place and person. In no acute distress.  HEENT: normocephalic, sclera non icteric. EOMI. Supple without lymphadenopathy  Cor: RRR  Lung: unlabored breathing at rest    Abd: soft, non tender and non distended. No abdominal aortic aneurysm palpable  Neck incision is healing well.  Palpable carotid pulses.  Psych: Normal    Last Recorded Vitals  Blood pressure 99/66, pulse 99, height 1.702 m (5' 7\"), weight 91.1 kg (200 lb 12.8 oz).    Relevant Results     Assessment/Plan   S/p carotid endarterectomy     RTC one year with duplex.    Erickson Morgan MD  Professor & Chief, Division of Vascular Surgery & Endovascular Therapy  , Vascular Residency & Fellowship Training Programs  [facility] [place]  [facility] / [facility] [place]  "

## 2023-12-23 ENCOUNTER — HOSPITAL ENCOUNTER (OUTPATIENT)
Dept: RADIOLOGY | Facility: HOSPITAL | Age: 73
Discharge: HOME | End: 2023-12-23
Payer: MEDICARE

## 2023-12-23 ENCOUNTER — APPOINTMENT (OUTPATIENT)
Dept: RADIOLOGY | Facility: HOSPITAL | Age: 73
End: 2023-12-23
Payer: MEDICARE

## 2023-12-23 DIAGNOSIS — N42.89 PROSTATE MASS: ICD-10-CM

## 2023-12-23 PROCEDURE — 2550000001 HC RX 255 CONTRASTS: Performed by: STUDENT IN AN ORGANIZED HEALTH CARE EDUCATION/TRAINING PROGRAM

## 2023-12-23 PROCEDURE — A9575 INJ GADOTERATE MEGLUMI 0.1ML: HCPCS | Performed by: STUDENT IN AN ORGANIZED HEALTH CARE EDUCATION/TRAINING PROGRAM

## 2023-12-23 PROCEDURE — 76498 UNLISTED MR PROCEDURE: CPT | Performed by: RADIOLOGY

## 2023-12-23 PROCEDURE — 72197 MRI PELVIS W/O & W/DYE: CPT

## 2023-12-23 PROCEDURE — 72197 MRI PELVIS W/O & W/DYE: CPT | Performed by: RADIOLOGY

## 2023-12-23 RX ORDER — GADOTERATE MEGLUMINE 376.9 MG/ML
0.2 INJECTION INTRAVENOUS
Status: COMPLETED | OUTPATIENT
Start: 2023-12-23 | End: 2023-12-23

## 2023-12-23 RX ADMIN — GADOTERATE MEGLUMINE 18 ML: 376.9 INJECTION INTRAVENOUS at 10:58

## 2023-12-27 ENCOUNTER — HOSPITAL ENCOUNTER (OUTPATIENT)
Dept: HOSPITAL 101 - CR | Age: 73
LOS: 2 days | Discharge: HOME | End: 2023-12-29
Payer: MEDICARE

## 2023-12-27 DIAGNOSIS — J84.112: Primary | ICD-10-CM

## 2023-12-27 PROCEDURE — 94625 PHY/QHP OP PULM RHB W/O MNTR: CPT

## 2023-12-27 PROCEDURE — G0239 OTH RESP PROC, GROUP: HCPCS

## 2023-12-28 ENCOUNTER — APPOINTMENT (OUTPATIENT)
Dept: RADIOLOGY | Facility: CLINIC | Age: 73
End: 2023-12-28
Payer: MEDICARE

## 2023-12-29 LAB
TESTOSTERONE FREE (CHAN): 85.1 PG/ML (ref 30–135)
TESTOSTERONE,TOTAL,LC-MS/MS: 587 NG/DL (ref 250–1100)

## 2024-01-02 ENCOUNTER — TELEPHONE (OUTPATIENT)
Dept: TRANSPLANT | Facility: HOSPITAL | Age: 74
End: 2024-01-02
Payer: MEDICARE

## 2024-01-02 DIAGNOSIS — I65.23 CAROTID STENOSIS, ASYMPTOMATIC, BILATERAL: Primary | ICD-10-CM

## 2024-01-02 RX ORDER — ATORVASTATIN CALCIUM 80 MG/1
80 TABLET, FILM COATED ORAL DAILY
Qty: 30 TABLET | Refills: 0 | Status: SHIPPED | OUTPATIENT
Start: 2024-01-02 | End: 2024-05-21

## 2024-01-02 NOTE — TELEPHONE ENCOUNTER
Pt called stating he needs a refill of atorvastatin (Lipitor) 80 mg tablet to [facility] on [address].

## 2024-01-02 NOTE — TELEPHONE ENCOUNTER
Called Mr. Block, advised him to reach out to his Vascular Medicine or Primary Care Doctor for Atorvastatin prescription, not prescribed by our practice. All questions answered

## 2024-01-05 ENCOUNTER — OFFICE VISIT (OUTPATIENT)
Dept: HEMATOLOGY/ONCOLOGY | Facility: HOSPITAL | Age: 74
End: 2024-01-05
Payer: MEDICARE

## 2024-01-05 VITALS
TEMPERATURE: 97.5 F | WEIGHT: 201.94 LBS | SYSTOLIC BLOOD PRESSURE: 123 MMHG | HEART RATE: 92 BPM | BODY MASS INDEX: 31.63 KG/M2 | RESPIRATION RATE: 16 BRPM | DIASTOLIC BLOOD PRESSURE: 73 MMHG | OXYGEN SATURATION: 90 %

## 2024-01-05 DIAGNOSIS — N42.89 PROSTATE MASS: ICD-10-CM

## 2024-01-05 PROCEDURE — 1126F AMNT PAIN NOTED NONE PRSNT: CPT | Performed by: STUDENT IN AN ORGANIZED HEALTH CARE EDUCATION/TRAINING PROGRAM

## 2024-01-05 PROCEDURE — 1036F TOBACCO NON-USER: CPT | Performed by: STUDENT IN AN ORGANIZED HEALTH CARE EDUCATION/TRAINING PROGRAM

## 2024-01-05 PROCEDURE — 99214 OFFICE O/P EST MOD 30 MIN: CPT | Performed by: STUDENT IN AN ORGANIZED HEALTH CARE EDUCATION/TRAINING PROGRAM

## 2024-01-05 PROCEDURE — 3078F DIAST BP <80 MM HG: CPT | Performed by: STUDENT IN AN ORGANIZED HEALTH CARE EDUCATION/TRAINING PROGRAM

## 2024-01-05 PROCEDURE — 3074F SYST BP LT 130 MM HG: CPT | Performed by: STUDENT IN AN ORGANIZED HEALTH CARE EDUCATION/TRAINING PROGRAM

## 2024-01-05 ASSESSMENT — PAIN SCALES - GENERAL: PAINLEVEL: 0-NO PAIN

## 2024-01-05 NOTE — PROGRESS NOTES
"Patient ID: David Block is a 73 y.o. male.  Diagnosis: idiopathic pulmonary fibrosis for lung transplantation.     HISTORY OF PRESENT ILLNESS:  Referral received from Dr. Cabrera through the transplant team.   Pt has idiopathic pulmonary fibrosis and had surgery for symptomatic stenosis of left carotid artery in preparation for lung transplantation.   He recently underwent a cysto/TUR for a 1-2mm lesion, biopsy performed on 11/02/2023. Revealed, \"bladder neoplasm of uncertain malignant potential\". Patient is now referred to discuss these results with  oncologist. Appointment confirmed with patient for 12/19/2023 with Dr. Urena. Outside info will be forwarded to team once received. Vel Swift RN.      Surgical History:  David has a past surgical history that includes CT angio neck (11/13/2023); Knee surgery; Cystoscopy; Colonoscopy; and Back surgery.    Social History:  David reports that he quit smoking about 14 years ago. His smoking use included cigarettes. He has never used smokeless tobacco.    Family History:    Family History   Problem Relation Name Age of Onset    Heart attack Father           OBJECTIVE:    VS / Pain:  /73   Pulse 92   Temp 36.4 °C (97.5 °F)   Resp 16   Wt 91.6 kg (201 lb 15.1 oz)   SpO2 90%   BMI 31.63 kg/m²   BSA: 2.08 meters squared    Performance Status:  ECOG Score: 0- Fully active, able to carry on all pre-disease performance w/o restriction.    Diagnostic Results   @=== 11/10/23 ===    CT ANGIO NECK W    - Impression -  1. The left internal carotid artery has greater than 95% stenosis in  the carotid bulb.    2. The right internal carotid artery has 80% stenosis by irregular  plaque at its origin.    MACRO:  Critical Finding:  See findings. Notification was initiated on  11/13/2023 at 4:20 pm by  John Montero.  (**-OCF-**) Instructions:    Signed by: John Montero 11/13/2023 4:21 PM  Dictation workstation:   [id number]     9/20/23 -   IMPRESSION:  1. Large soft tissue mass in " the region of right prostate gland  measuring 4.8 cm. Further evaluation with serum PSA and pelvic  ultrasound is recommended. If PSA is elevated, MRI of the prostate  can be obtained. Differential diagnosis include primary prostatic  mass versus large complex right seminal vesicle cyst  2. Urinary bladder is overdistended, could be related to urethral  obstruction from the right prostatic mass as described above.  3. Stable pulmonary findings consistent with pulmonary fibrosis,  better visualized on high-resolution chest CT from 08/04/2023.  4. Moderate calcified atherosclerotic changes of the abdominal aorta  and its major branching vessels.    11/2/23 TURBT -   Urothelial proliferation with atypia / unknown sig  No invasive carcinoma.       12/23/23 - MRI Prostate   1.  A well encapsulated heterogeneous/multinodular lesion with  hemorrhagic foci apparently arising from the right apical transition  zone is seen to compress and occupy the near entirety of the right  peripheral zone. MRI appearance of this lesion is atypical, and does  not allow for PI-RADS classification. Given similarity with other  areas of BPH changes of the transition zone, an extruded BPH nodule  is a consideration, however other differentials include STUMP or  mesenchymal type lesions. A punctate focus of restricted diffusion  was seen within the lesion, however with no correlate on T2 weighted  images or dynamic contrast enhancement, to allow for assessment of  suspicion. Of note, this focus was individually outlined on 3D post  processing in case a fusion biopsy procedure is considered.  2. No evidence of suspicious focal lesions in the remainder of the  visualized prostatic parenchyma, with PI-RADS 2 changes noted in  contralateral peripheral zone.    Assessment/Plan   I personally saw patient and counselled all visit  on his diagnosis, alban history and coordination of his care.   This is a 73 y.o. male  hx of idiopathic pulmonary  fibrosis being considered for lung transplantation. He was found with large soft tissue mass in the region of right prostate gland measuring 4.8 cm with normal PSA 09/2023. MRI describes well defined area compatible with BPH. Favor observation and I don't think this should impact his plans to get lung transplant. See us back in 6-9 months  Thank you for the opportunity to be involved in the care of David Block. Please do not hesitate to reach out with any questions. Thank you.   -------------------------------------------------------------------------------------------------------------------------------  Latrell Urena MD, Msc, FACP  Co-Leader Genitourinary () Disease Team  Director of  Medical Oncology Research Program   [facility]  Associate Professor of Medicine  [facility] [address], [address]  [place] [id number]  Phone: [phone]  [email or web address]

## 2024-01-09 ENCOUNTER — HOSPITAL ENCOUNTER (INPATIENT)
Age: 74
End: 2024-01-09
Attending: THORACIC SURGERY (CARDIOTHORACIC VASCULAR SURGERY) | Admitting: THORACIC SURGERY (CARDIOTHORACIC VASCULAR SURGERY)
Payer: MEDICARE

## 2024-01-10 ENCOUNTER — TELEPHONE (OUTPATIENT)
Dept: TRANSPLANT | Facility: HOSPITAL | Age: 74
End: 2024-01-10
Payer: MEDICARE

## 2024-01-10 NOTE — TELEPHONE ENCOUNTER
SW spoke to pt to follow up regarding tx evaluation. Pt shared his information remains the same including address, phone and insurance. Pt confirmed he has been doing okay, getting tired more frequently requiring more oxygen use. Pt confirmed he uses 3-6 lters of O2. Pt confirmed his support remains his wife, dtr Abbey and friend Heriberto. Pt Shared his mood has been stable, just feeling more fatigue than previously. Pt shared he has been doing well with his medications at home, making the adjustments set by  well. Pt shared nothing else has changed since his last visit. Pt shared he knows he is coming into the clinic on Friday to meet with surgeon and  and sw. Pt inquired about [facility] and how to utilize this resource best. SW confirmed pt has no other questions at this time. SW to follow up as tx evaluation continues.    Yovana TURPIN

## 2024-01-11 ENCOUNTER — COMMITTEE REVIEW (OUTPATIENT)
Dept: TRANSPLANT | Facility: HOSPITAL | Age: 74
End: 2024-01-11
Payer: MEDICARE

## 2024-01-11 ENCOUNTER — DOCUMENTATION (OUTPATIENT)
Dept: TRANSPLANT | Facility: HOSPITAL | Age: 74
End: 2024-01-11
Payer: MEDICARE

## 2024-01-11 ENCOUNTER — OFFICE VISIT (OUTPATIENT)
Dept: OTOLARYNGOLOGY | Facility: HOSPITAL | Age: 74
End: 2024-01-11
Payer: MEDICARE

## 2024-01-11 VITALS — HEIGHT: 67 IN | WEIGHT: 201.3 LBS | BODY MASS INDEX: 31.6 KG/M2 | TEMPERATURE: 96.4 F

## 2024-01-11 DIAGNOSIS — J84.112 IDIOPATHIC PULMONARY FIBROSIS (MULTI): ICD-10-CM

## 2024-01-11 DIAGNOSIS — S10.93XD HEMATOMA OF NECK, SUBSEQUENT ENCOUNTER: ICD-10-CM

## 2024-01-11 DIAGNOSIS — R13.10 DYSPHAGIA, UNSPECIFIED TYPE: Primary | ICD-10-CM

## 2024-01-11 PROCEDURE — 31575 DIAGNOSTIC LARYNGOSCOPY: CPT | Performed by: OTOLARYNGOLOGY

## 2024-01-11 PROCEDURE — 99214 OFFICE O/P EST MOD 30 MIN: CPT | Performed by: OTOLARYNGOLOGY

## 2024-01-11 PROCEDURE — 1036F TOBACCO NON-USER: CPT | Performed by: OTOLARYNGOLOGY

## 2024-01-11 PROCEDURE — 1159F MED LIST DOCD IN RCRD: CPT | Performed by: OTOLARYNGOLOGY

## 2024-01-11 PROCEDURE — 1126F AMNT PAIN NOTED NONE PRSNT: CPT | Performed by: OTOLARYNGOLOGY

## 2024-01-11 ASSESSMENT — PATIENT HEALTH QUESTIONNAIRE - PHQ9
SUM OF ALL RESPONSES TO PHQ9 QUESTIONS 1 & 2: 0
1. LITTLE INTEREST OR PLEASURE IN DOING THINGS: NOT AT ALL
2. FEELING DOWN, DEPRESSED OR HOPELESS: NOT AT ALL

## 2024-01-11 NOTE — PROGRESS NOTES
Transplant Candidate Pharmacist Screening Note     Current Outpatient Medications on File Prior to Visit   Medication Sig Dispense Refill    acetaminophen (Tylenol) 325 mg capsule Take 1 capsule (325 mg) by mouth every 6 hours.      aspirin 81 mg capsule Take 1 capsule by mouth once daily.      atorvastatin (Lipitor) 80 mg tablet Take 1 tablet (80 mg) by mouth once daily. 30 tablet 0    b complex vitamins capsule Take 1 capsule by mouth once daily.      bisoprolol (Zebeta) 5 mg tablet Take 0.5 tablets (2.5 mg) by mouth once daily.      cholecalciferol (Vitamin D-3) 125 MCG (5000 UT) capsule Take 1 capsule (125 mcg) by mouth once daily.      diphenhydrAMINE-acetaminophen (Tylenol PM)  mg per tablet Take 1 tablet by mouth as needed at bedtime for sleep.      escitalopram (Lexapro) 5 mg tablet Take 1 tablet (5 mg) by mouth once daily.      hydroCHLOROthiazide (HYDRODiuril) 25 mg tablet Take 1 tablet (25 mg) by mouth once daily in the morning.      losartan (Cozaar) 25 mg tablet Take 1 tablet (25 mg) by mouth once daily.      multivitamin/iron/folic acid (CENTRUM ORAL) Take 1 tablet by mouth once daily.      oxygen (O2) gas therapy Inhale. 4L WITH ACTIVITY; 2L WITH REST AS NEEDED; DOES NOT USE O2 AT NIGHT       No current facility-administered medications on file prior to visit.       The patient's reported medications have been reviewed. Based on the above medication list, there are no pharmacologic contraindications to lung transplantation.    Tommy Mata, PharmD, BCPS, BCTXP  Solid Organ Transplant Clinical Pharmacy Specialist

## 2024-01-11 NOTE — COMMITTEE REVIEW
Presentation for Listing Note  Evaluation Date: 8/25/2023  Committee Review Date: 1/11/2024    Organ being evaluated for: Lung    Transplant Phase: Evaluation  Transplant Status: Active    Referring Physician: Dr. Delaney    Primary Diagnosis: IPF  Secondary Diagnosis:     Committee Review Decision: Needs Re-presentation      Committee Discussion Details:  The candidate's evaluation was presented and discussed at the Lung  Multidisciplinary Selection Conference. After review of the candidate's diagnosis and the evaluations of the multidisciplinary team members, it was the consensus of the Selection Committee that the candidate does not meet Lung Selection Criteria and is Needs Re-presentation for Lung transplant.     Physician: Age, CAD, prostate bx inconclusive   Surgeon: Age, CAD, prostate bx inconclusive   Social Work: No concerns  Dietician: No concerns  Pharmacist: No concerns  Transplant Coordinator: Age, CAD, prostate bx inconclusive     Pending: will need to see urology here at [facility] for repeat prostate bx, TAO, and will re-discuss. Patient is scheduled to come into clinic tomorrow. Will try to reschedule at an earlier time for surgeon visit as well.

## 2024-01-11 NOTE — PROGRESS NOTES
Patient: David Block   MRN: [id number] YOB: 1950   Sex: male Age: 73 y.o.  Date of Service: 2024       ASSESSMENT AND PLAN  I discussed the findings with David Block and have recommended the followin. Oropharyngeal dysphagia, acute in setting of neck hematoma, now resolved  - After resolution of hematoma in the inpatient setting, patient has had sigificant improvement in his swallow. Currently only pill dysphagia.  - Counseled on methods to improve pill dysphagia such as taking one pill at a time, taking medications with applesauce/yoghurt, and pill glide spray to prevent pill esophagitis.  - follow up PRN    2. Hoarseness- Resolved  - Transient voice changes which resolved after evacuation of hematoma- now at baseline  - follow up PRN    3. Airway - normal on endoscopy  - Prior difficulty related to acute concerns with hematoma and edema which is now resolved on flexible laryngoscopy  - Do not anticipate need for ENT involvement for intubation at upcoming transplant surgery.    CHIEF COMPLAINT  Chief Complaint   Patient presents with    Follow-up     Carotid artery evaluation.         HISTORY OF PRESENT ILLNESS  David Block is a 73 y.o. male referred for hospital follow-up. The patient has a medical history significant for HTN, HLD, severe left carotid artery stenosis, idiopathic pulmonary fibrosis who is currently being worked up for lung transplant. He is admitted status post left carotid endarterectomy. He was transferred to the ICU post operatively, extubated and stable. He was noted to have a small but stable neck hematoma post operatively. He ended up having to be taken back to the OR for evacuation of a hematoma with ENT assistance and had a drain placed. He otherwise progressed as expected post operatively, remaining hemodynamically stable and neuro intact. Drain was removed on  as it had less than 25cc of output over the previous 24 hours. He then stayed overnight after the  drain removal to monitor for any signs of worsening swelling or hematoma formation. He did not develop a worsening hematoma and tolerated a regular diet the following day.     Since discharge he reports he has had no issues with his voice. He has some minimal dysphagia to his tylenol capsules only.    Op note 11/22/23  Violaceous edema of left arytenoid, pyriform sinus, and aryepiglottic fold hooding over left TVC. Initial transoral fiberoptic intubation attempt by anesthesia visualized the airway, but ETT ended supraglottic. Airway ultimately secured with Glidescope; flexible bronchoscopy completed to confirm appropriate placement within the airway      ADDITIONAL HISTORY  Past Medical History  He has a past medical history of BPH (benign prostatic hyperplasia), Cancer of cheek (CMS/HCC), Hyperlipidemia, Hypertension, and Idiopathic pulmonary fibrosis (CMS/HCC). Surgical History  He has a past surgical history that includes CT angio neck (11/13/2023); Knee surgery; Cystoscopy; Colonoscopy; and Back surgery.   Social History  He reports that he quit smoking about 14 years ago. His smoking use included cigarettes. He has never used smokeless tobacco. No history on file for alcohol use and drug use. Allergies  Nifedipine     Family History  Family History   Problem Relation Name Age of Onset    Heart attack Father          REVIEW OF SYSTEMS  All 10 systems were reviewed and negative except for above.      PHYSICAL EXAM  ENT Physical Exam   GENERAL: Well-nourished and developed, alert and appropriate, no distress, voice [id number]  RESPIRATORY: Breathing quietly, no stridor  HEAD: Normocephalic atraumatic  FACE: Symmetric, no masses or lesions  EYES:  Pupils reactive, sclera clear, external ocular muscles intact, no nystagmus.    EARS:  Pinnae normal. External auditory canals clear and tympanic membranes intact.  NOSE:  No anterior lesions, masses or polyps.  ORAL CAVITY/OROPHARYNX:  Buccal mucosa is moist without  "lesions or masses, tongue midline and palate elevates symmetrically. Tongue mobility intact.  NECK:  Soft. There is no lymphadenopathy or thyromegaly.    NEUROLOGIC:  Cranial nerves II-XII grossly intact.       Last Recorded Vitals  Temperature 35.8 °C (96.4 °F), height 1.702 m (5' 7\"), weight 91.3 kg (201 lb 4.8 oz).    RESULTS    Patient Reported Outcome Measures  N/A    PROCEDURES  Laryngoscopy    Date/Time: 1/11/2024 2:03 PM    Performed by: Radha Munroe MD  Authorized by: Radha Munroe MD         Flexible Fiberoptic Laryngoscopy     Patient failed a mirror exam due to limitations of equipment and the need for laryngoscopy to assess laryngeal anatomy and function    PREOPERATIVE DIAGNOSIS: dysphagia    POSTOPERATIVE DIAGNOSIS: Same    PROCEDURE: Transnasal videolaryngoscopy    ANESTHESIA:  Topical    COMPLICATIONS:  None    SPECIMENS:  None    PROCEDURE IN DETAIL:  The patient was brought into the endoscopy suite, placed in the upright position.  The nasal cavity was topically decongested anesthetized.  The distal chip video laryngoscope was passed through the nasal cavity.  The nasal cavity and nasopharynx were within normal limits except noted below. The following findings on laryngoscopy were noted:         Tongue Base: no masses or lesions          Left vocal fold mobility: mobile- slight asymmetry in motion         Right vocal fold mobility: mobile         Glottal closure: complete         Laryngeal muscle tension: normal         Symmetry: symmetric         Vocal fold free edge: no concerning masses or lesions         Other abnormalities: complete resolution of edema related to hematoma, symmetry at all levels     The patient tolerated the procedure well.      ----------------------------------------------------------------------  Radha Munroe MD, MAEd    [facility]  Department of Otolaryngology - Head and Neck Surgery  [facility]" Center    The total time I spent in care of this patient today (excluding time spent on other billable services) is as follows:    Time Spent  Prep time on day of patient encounter: 5 minutes  Time spent directly with patient, family or caregiver: 15 minutes  Additional Time Spent on Patient Care Activities: 5 minutes  Documentation Time: 5 minutes  Other Time Spent: 0 minutes  Total: 30 minutes

## 2024-01-12 ENCOUNTER — SOCIAL WORK (OUTPATIENT)
Dept: TRANSPLANT | Facility: HOSPITAL | Age: 74
End: 2024-01-12
Payer: MEDICARE

## 2024-01-12 ENCOUNTER — TELEPHONE (OUTPATIENT)
Dept: TRANSPLANT | Facility: HOSPITAL | Age: 74
End: 2024-01-12

## 2024-01-12 ENCOUNTER — APPOINTMENT (OUTPATIENT)
Dept: TRANSPLANT | Facility: HOSPITAL | Age: 74
End: 2024-01-12
Payer: MEDICARE

## 2024-01-12 ENCOUNTER — OFFICE VISIT (OUTPATIENT)
Dept: TRANSPLANT | Facility: HOSPITAL | Age: 74
End: 2024-01-12
Payer: MEDICARE

## 2024-01-12 ENCOUNTER — OFFICE VISIT (OUTPATIENT)
Dept: CARDIAC SURGERY | Facility: HOSPITAL | Age: 74
End: 2024-01-12
Payer: MEDICARE

## 2024-01-12 VITALS
SYSTOLIC BLOOD PRESSURE: 102 MMHG | DIASTOLIC BLOOD PRESSURE: 66 MMHG | OXYGEN SATURATION: 91 % | HEART RATE: 78 BPM | WEIGHT: 201 LBS | TEMPERATURE: 98 F | BODY MASS INDEX: 31.48 KG/M2

## 2024-01-12 VITALS
HEART RATE: 78 BPM | WEIGHT: 201 LBS | DIASTOLIC BLOOD PRESSURE: 66 MMHG | OXYGEN SATURATION: 86 % | BODY MASS INDEX: 31.55 KG/M2 | HEIGHT: 67 IN | SYSTOLIC BLOOD PRESSURE: 102 MMHG

## 2024-01-12 DIAGNOSIS — J84.9 ILD (INTERSTITIAL LUNG DISEASE) (MULTI): ICD-10-CM

## 2024-01-12 DIAGNOSIS — Z01.818 PRE-TRANSPLANT EVALUATION FOR LUNG TRANSPLANT: Primary | ICD-10-CM

## 2024-01-12 PROCEDURE — 1159F MED LIST DOCD IN RCRD: CPT | Performed by: INTERNAL MEDICINE

## 2024-01-12 PROCEDURE — 3074F SYST BP LT 130 MM HG: CPT | Performed by: THORACIC SURGERY (CARDIOTHORACIC VASCULAR SURGERY)

## 2024-01-12 PROCEDURE — 1126F AMNT PAIN NOTED NONE PRSNT: CPT | Performed by: INTERNAL MEDICINE

## 2024-01-12 PROCEDURE — 99215 OFFICE O/P EST HI 40 MIN: CPT | Performed by: INTERNAL MEDICINE

## 2024-01-12 PROCEDURE — 99215 OFFICE O/P EST HI 40 MIN: CPT | Performed by: THORACIC SURGERY (CARDIOTHORACIC VASCULAR SURGERY)

## 2024-01-12 PROCEDURE — 1036F TOBACCO NON-USER: CPT | Performed by: THORACIC SURGERY (CARDIOTHORACIC VASCULAR SURGERY)

## 2024-01-12 PROCEDURE — 3074F SYST BP LT 130 MM HG: CPT | Performed by: INTERNAL MEDICINE

## 2024-01-12 PROCEDURE — 1159F MED LIST DOCD IN RCRD: CPT | Performed by: THORACIC SURGERY (CARDIOTHORACIC VASCULAR SURGERY)

## 2024-01-12 PROCEDURE — 3078F DIAST BP <80 MM HG: CPT | Performed by: THORACIC SURGERY (CARDIOTHORACIC VASCULAR SURGERY)

## 2024-01-12 PROCEDURE — 3078F DIAST BP <80 MM HG: CPT | Performed by: INTERNAL MEDICINE

## 2024-01-12 PROCEDURE — 1036F TOBACCO NON-USER: CPT | Performed by: INTERNAL MEDICINE

## 2024-01-12 PROCEDURE — 1126F AMNT PAIN NOTED NONE PRSNT: CPT | Performed by: THORACIC SURGERY (CARDIOTHORACIC VASCULAR SURGERY)

## 2024-01-12 ASSESSMENT — PAIN SCALES - GENERAL
PAINLEVEL: 0-NO PAIN
PAINLEVEL: 0-NO PAIN

## 2024-01-12 NOTE — PROGRESS NOTES
SW met with pt and pt wife in [place] [address] for a follow up appt. Pt was active and engaged during visit. Pt shared that he was feeling a bit frustrated about needing to have another test done to complete his evaluation. Pt shared he was under the understanding that he could have been listed today. SW provided support to pt, sharing that the team is wanting to make sure they don't miss anything before potentially listing and cause potentially more harm. Pt and pt wife demonstrated verbal understanding of this. Pt confirmed all other information remains the same. Pt wife had questinos regarding transplant [facility]. SW provided information.  SW provided contact information. SW confirmed pt and pt wife had no further questions or concerns at this time.     Yovana TURPIN

## 2024-01-12 NOTE — PROGRESS NOTES
73 y/o with IPF/COPD, hypertension, hyperlipidemia, anxiety, vitamin D deficiency referred by Dr. Delaney, initially Dr. Albert who presents for follow-up visit for lung transplant. Patient reports LANGFORD for past 5 years, diagnosed about 2 years ago, started supplemental oxygen 2-3 months ago. Supposed to be wearing 2L at rest and 4L with exertion but not always complaint with this. Reports LANGFORD when walking one block and one flight of stairs, also LANGFORD when completing his daily activities such as getting dressed and taking a shower. Productive cough at times with beige sputum. No nausea or vomiting, no STANISLAW symptoms. Started OFEV about 2 years ago, took for a year, discontinued due to diarrhea. Scheduled to start Esbriet next week. No history of frequent infections or hospitalizations, does not received Abx frequently, has not been on chronic steroids in the past, no prior chest surgeries. Attending pulmonary rehabilitation three times a week.    9/29/23: Since his last visit, feels breathing has overall been stable, on 2L at rest, 4L with exertion. No illnesses or hospitalizations since his last visit. Started Esbriet last week, tolerating well. Stating active and attending pulmonary rehabilitation. Scheduled for US and biopsy with Urology on 10/9/23. Started on Plavix after LHC, Lipitor increased.    1/12/24: Feels breathing is overall worse. Using 6L at rest and up to 10L with activity, restarted pulmonary rehabilitation and now attending, needs more oxygen. Has not been using oxygen regularly with activity at home, understands he needs to do this. Underwent left CEA on 11/20/23 and tolerated it well. Underwent  prostate biopsy on 11/2/23 which showed urothelial proliferation with atypia of unknown significance, papillary vascular rich tissues with a single layer cuboidal epithelial lining, no high grade or invasive urothelial neoplasm identified. Seen by Oncology, MRI which seemed consistent with BPH, cleared for  transplant. No illnesses or hospitalizations since his last visit.    Other medical history includes hypertension, well controlled and hyperlipidemia, vitamin D deficiency. Anxiety, on Lexapro, feels it has been stable. Working on weight loss, lost about 4 lbs, BMI 32.    Last colonoscopy 10 years ago, received vaccines, including COVID x 2, history of elevated PSA, following with Urology. Following with Dermatology closely, no history of malignancy.    Overall, he feels his symptoms are progressing and would like to move forward with lung transplant if he is deemed an appropriate candidate.    Past Medical History: IPF/COPD, hypertension, hyperlipidemia, anxiety, vitamin D deficiency    Past Surgical History: Right knee arthroplasty 2020, back surgery 1990, navel repair 2003, no prior chest surgeries.    Social History: History of tobacco use, 1 PPD for 30 years, quit 13 years ago, drinks one bourbon with water a few times a week, no drug use. Used to work as , driving trucks. Lives in [place], [place].No other occupational exposures. , one daughter. Wife with history of CVA, does not drive. Caregivers would be daughter and close friend. Participating in pulmonary rehabilitation.    Family History: Mother with intracerebral hemorrhage, father with CAD, no history of lung disease or cancer        ROS:  Constitutional: no chills, no fever, no night sweats, not feeling poorly and not feeling tired.   Eyes: no blurred vision, no eyesight problems, no dryness of the eyes and no eye pain.   ENT: no hearing loss, no nasal congestion, no nasal discharge, no hoarseness, no sore throat, no earache, no discharge from the ear(s), the ears do not feel full, no tinnitus, no vertigo, no nasal passage blockage, no nosebleeds, no postnasal drip, no rhinorrhea, no sinus pressure, no bleeding gums, no xerostomia and no oral lesions.   Neck: no mass(es) and no swelling.   Cardiovascular: no chest pain, no intermittent leg  claudication, no lower extremity edema, no palpitations, no syncope, no tachycardia, no bradycardia and no orthopnea.   Respiratory: shortness of breath during exertion and productive cough, but no cough, no shortness of breath at rest, no wheezing, non dry cough, no hemoptysis and no postural nocturnal dyspnea.   Gastrointestinal: no abdominal pain, no bloating, no nausea and no vomiting.   Musculoskeletal: no arthralgias, no back pain, no myalgias, no history of falls, no localized joint pain, no joint redness, no joint stiffness, no joint swelling, no limb pain, no limb swelling and no neck pain.   Integumentary: no rashes.   Neurological: numbness, but no convulsions, no difficulty walking, no diplopia, no dizziness, no dysarthria, no facial weakness, no headache, no limb weakness, no memory changes, no speech difficulties, no tingling, no confusion and no syncope.   Psychiatric: no anxiety, no depression, no anhedonia, no personality change, no emotional problems, no homicidal thoughts, no sleep disturbances and no suicidal ideations.   Endocrine: no changes in appetite, no feelings of weakness, no hair thinning or loss, no heat/cold intolerance, no polydipsia, no recent weight gain, no deepening of the voice, no polyuria, no muscle weakness and no proptosis.   Hematologic/Lymphatic: no tendency for easy bruising, no swollen glands, no tendency for easy bleeding and no recurrent infections.   All other systems have been reviewed and are negative for complaint.     Physical Exam       VSS: 102/66, 78, 12, 91% on 4L, BMI 31  Gen: alert and oriented x 3,pleasant in NAD, maurisio complexion, on supplemental oxygen  HEENT: Head NC/AT; PERRLA, EOMI, moist mucus membranes, no LAD or JVD  Pulm: Bibasilar crackles, symmetrical expansion, no wheezing, good air entry bilaterally  CV: Normal S1 and S2, no murmurs, gallops, rubs  GI: soft, NT, ND, obese, active BS, no guarding or rebound  Ext: warm, NT, 2 plus DP pulses  bilaterally, clubbing in fingers bilaterally, no LE edema  Neuro: CN 2-12 intact, intact gross motor, sensation, cerebellar function     Results:   9/15/23: Spirometry FEV1 2.54 88%  6MWT: 305M, O2 saturations decreased from 99 to 82% on 10L    8/4/23: FEV12.43 (83%) TLC 4.88(75%), DLCO 6.95(28%)  6 minute walk test : 224 m, SpO2 during test 77% on 4L O2 NC    Chest CT 8/4/23: reviewed, upper lobe emphysematous changes with lower lobe fibrosis, no nodules    Esophagram: small hiatal hernia without significant GERD, GES no gastroparesis  SNIFF test: normal movement of diaphragms bilaterally  Colonoscopy: diverticulosis, pathology with tubular adenoma  DEXA: no osteoporosis  CT C/A/P: large soft tissue mass in right prostate, 4.8 cm in diameter, moderate atherosclerosis, lower lobe honeycombing and fibrosis  TTE: normal LV and RV function, EF 65%, delayed diastolic filling  Carotid Dopplers- less than 50% right ICA, greater than 70% stenosis of left ICA, s/p left CEA, repeat Dopplers 12/22/23 with less than 50% stenosis bilaterally  Cardiac Catheterization: normal filling pressures mild PAH and preserved CO. 2 vessel disease, RCA and LCx-OM disease. Minor plaques in proximal LAD.     MRI Prostate: 12/23/23  1.  A well encapsulated heterogeneous/multinodular lesion with  hemorrhagic foci apparently arising from the right apical transition  zone is seen to compress and occupy the near entirety of the right  peripheral zone. MRI appearance of this lesion is atypical, and does  not allow for PI-RADS classification. Given similarity with other  areas of BPH changes of the transition zone, an extruded BPH nodule  is a consideration, however other differentials include STUMP or  mesenchymal type lesions. A punctate focus of restricted diffusion  was seen within the lesion, however with no correlate on T2 weighted  images or dynamic contrast enhancement, to allow for assessment of  suspicion. Of note, this focus was  individually outlined on 3D post  processing in case a fusion biopsy procedure is considered.  2. No evidence of suspicious focal lesions in the remainder of the  visualized prostatic parenchyma, with PI-RADS 2 changes noted in  contralateral peripheral zone.        Assessment/Plan:  71 y/o M with COPD/IPF, hypertension, hyperlipidemia, vitamin D deficiency who presents for follow-up visit for lung transplant.    Regarding his IPF, he has evidence of upper lobe emphysematous changes and lower lobe fibrosis, appears to be progressing, now requiring 6L at rest and 10L with exertion. His DLCO is severely decreased at 28%. He was able to 305 meters on 10L with significant desaturation. Has not been on chronic steroids in the past, no history of prior chest surgeries. He will continue pulmonary rehabilitation to help improve his strength and mobility, restarted recently, still appears deconditioned on exam.    He has a history of hypertension and hyperlipidemia, well-controlled. BMI is 31, acceptable, we discussed a weight loss goal of 10-15 lbs for a BMI closer to 30 for transplant, continuing to work on this.    He has evidence of 2 vessel disease on LHC, RCA and LCx to OM, will need revascularization at time of transplant. No further Plavix at this time. Chest CT also with evidence of moderate atherosclerotic disease in aorta and distal vessels, which increases risk of post-operative complications. Furthermore, Carotid dopplers with greater than 70% stenosis of left ICA, underwent L CEA on 11/20/23, repeat Carotid Dopplers with no significant stenosis.    His CT A/P shows a large soft tissue mass in right prostate. History of elevated PSA in the past, last 2.3. Underwent  prostate biopsy on 11/2/23 which showed urothelial proliferation with atypia of unknown significance, papillary vascular rich tissues with a single layer cuboidal epithelial lining, no high grade or invasive urothelial neoplasm identified. Seen by  Oncology, MRI which seemed consistent with BPH, cleared for transplant. However, still concern for possible prostate cancer in setting of underlying immunosuppression post transplant. Plan for repeat prostate biopsy at  for further evaluation.    He appears frailer today in clinic. Has adequate social support. UTD on immunizations, pathology from colonoscopy only with tubular adenoma.    We discussed at length the risks and benefits of lung transplant. We discussed the one year survival of 90% and 5 year survival of 50%. We discussed the need to absolutely take anti-rejection medications for the rest of his life to prevent graft failure. We also discussed the necessity of frequent follow-up post transplant at (primarily at [facility]) with bi-weekly clinic visits, bi-weekly labs and bronchoscopies every 2 months for the first 2 years post transplant.    Patient and wife voiced understanding of plan of care, total time 60 minutes spent regarding counseling and coordination of care.        Will present at weekly selection committee meeting next week. Appears more deconditioned and frail, also with history of CAD, significant vascular disease which increases risk for post-operative complications. Also will need re-biopsy of prostate lesion.

## 2024-01-12 NOTE — TELEPHONE ENCOUNTER
Pt's daughter called asking about pt's status and had a lot of questions that I could not answer. Please contact patient's daughter back.

## 2024-01-12 NOTE — TELEPHONE ENCOUNTER
Called and spoke to daughter Abbey,  Phone [phone]  Discussed that Mr Block is a high risk candidate due to age, CAD, vascular disease, deconditioning and prostate mass.  Will discuss candidacy at meeting next week and will call her after regarding decision.  Verbalized understanding.

## 2024-01-15 DIAGNOSIS — R68.89 ABNORMAL DIGITAL RECTAL EXAM: Primary | ICD-10-CM

## 2024-01-15 PROBLEM — Z01.818 PRE-TRANSPLANT EVALUATION FOR LUNG TRANSPLANT: Status: ACTIVE | Noted: 2024-01-15

## 2024-01-15 RX ORDER — ACETAMINOPHEN 325 MG/1
975 TABLET ORAL ONCE
Status: CANCELLED | OUTPATIENT
Start: 2024-01-15 | End: 2024-01-15

## 2024-01-15 RX ORDER — SODIUM CHLORIDE 9 MG/ML
100 INJECTION, SOLUTION INTRAVENOUS CONTINUOUS
Status: CANCELLED | OUTPATIENT
Start: 2024-01-15

## 2024-01-15 NOTE — PROGRESS NOTES
I have seen and examined the patient, reviewed his chart and imaging. Patient was presented and discussed at the Lung Tx Selection meeting. I first saw the patient in clinic here in the fall, prior to initiation of the pre-Tx work up. Since then, the patient underwent left CEA, prostate Bx and prostate MRI. At this time, he reports quite significant decline in his overall level physical activity - he is not able to perform his daily activity at the same level as in fall, and he gets profoundly hypoxic with minimal exertion.  Patient also has CAD with high grade lesion of the RCA and moderate stenosis of the OM.   I feel that while we should get a completed work up for this patient, he de jesus snot appear to be a good candidate for a BLT here because of his age, progressive decline in physical activity and increase in frailty, CAD, severe diffuse atherosclerotic disease, s/p recent Left CEA. I will discuss this impression with the team and at the Selection Meeting next week.

## 2024-01-17 ENCOUNTER — TELEPHONE (OUTPATIENT)
Dept: TRANSPLANT | Facility: HOSPITAL | Age: 74
End: 2024-01-17
Payer: MEDICARE

## 2024-01-18 ENCOUNTER — COMMITTEE REVIEW (OUTPATIENT)
Dept: TRANSPLANT | Facility: HOSPITAL | Age: 74
End: 2024-01-18
Payer: MEDICARE

## 2024-01-18 ENCOUNTER — TELEPHONE (OUTPATIENT)
Dept: TRANSPLANT | Facility: HOSPITAL | Age: 74
End: 2024-01-18
Payer: MEDICARE

## 2024-01-18 ENCOUNTER — DOCUMENTATION (OUTPATIENT)
Dept: TRANSPLANT | Facility: HOSPITAL | Age: 74
End: 2024-01-18
Payer: MEDICARE

## 2024-01-18 NOTE — TELEPHONE ENCOUNTER
Called and spoke to patient. Informed him that case was discussed at weekly lung transplant selection committee meeting and patient was declined for transplant due to age, vascular disease, CAD, need for CABG at time of transplant, deconditioning which together increase risk for post-operative complications.  Also concern for prostate mass, undergoing repeat biopsy on 1/31/24.  Will send referral email to [facility] to start process for evaluation there.    Also called and discussed with daughter regarding above.Both patient and wife daughter verbalized understanding.

## 2024-01-18 NOTE — LETTER
January 18, 2024    David Block  [address] [place] [id number]      Dear Mr. Block:    Your evaluation for transplantation was completed by our multi-disciplinary transplant team on 1/18/2024. I regret to inform you that the decision was not to proceed with placing you on the [facility] ([facility]) waiting list for a lung transplant.    Issues were identified by the committee that would jeopardize a successful transplant outcome. The reason(s) leading to this decision are:    Age, vascular disease, CAD, need for CABG at time of transplant, deconditioning which together increase risk for post-operative complications.  Also concern for prostate mass.     A copy of this letter has been sent to your healthcare providers so they are also aware of our decision.  Please be in touch with your healthcare provider to make arrangements for your ongoing care.     If you have any questions regarding the decision, please call us at [phone].         Sincerely,      Beth Reyes RN

## 2024-01-18 NOTE — LETTER
January 18, 2024    David Block  [address] [place] [id number]      Dear Mr. Block:    Our multi-disciplinary transplant team completed a review of your medical records on 1/18/2024.  I regret to inform you that the decision was not to proceed with placing you on the [facility] ([facility]) waiting list for a Lung transplant.    Our transplant program consists of surgeons and medical doctors who provide coverage 365 days a year, 24 hours a day.     If you have any questions or concerns regarding your insurance coverage or billing issues, a  is available to speak with you.     It is important to keep us updated of any major changes in your medical condition, contact information and health insurance coverage.     Please don't hesitate to contact us at Dept: [phone] with any questions or concerns. We look forward to working with you through this process.      Sincerely,      Beth Reyes RN          The [facility] Toll-free Patient Services Line:  Your Resource for Organ Transplant Information    If you have a question regarding your own medical care, you always should call your transplant hospital first. However, for general organ transplant-related information, you should call the [facility] (UNOS) toll-free patient services line at [phone].  Anyone, including potential transplant candidates, candidates, recipients, family members, friends, living donors, and donor family members, can call this number to:    Talk about organ donation, living donation, the transplant process, the donation process, and transplant policies.  Get a free patient information kit with helpful booklets, waiting list and transplant information, and a list of all transplant hospitals.  Ask questions about the Organ Procurement and Transplantation Network (OPTN) web site (http://optn.transplant.hrsa.gov/), the [facility] Web site (http://unos.org/), or the UNOS web site for  living donors and transplant recipients. (http://www.transplantliving.org/).  Learn how [facility] and the OPTN can help you.  Talk about any concerns that you may have with a transplant hospital.    [employer] is a not-for-profit organization that provides the administrative services for the national OPTN under federal contract to the Health Resources and Services Administration (HRSA), an agency under the U.S. Department of Health and Human Services (HHS).    [facility] and the OPTN are responsible for:    Providing educational material for patients, the public, and professionals.  Raising awareness of the need for donated organs and tissue.  Writing organ transplant policy with help from transplant professionals, transplant patients, transplant candidates, donor families, living donors, and the public.  Coordinating organ procurement, matching, and placement.  Collecting information about every organ transplant and donation that occurs in the [place].    Remember, you should contact your transplant hospital directly if you have questions or concerns about your own medical care including medical records, work-up progress, and test results.    [facility] is not your transplant hospital, and staff at [facility] will not be able to transfer you to your transplant hospital, so keep your transplant hospital’s phone number handy.    However, while you research your transplant needs and learn as much as you can about transplantation and donation, we welcome your call to our toll-free patient services line at [phone].      [facility] PIL Final Rev 1-

## 2024-01-18 NOTE — LETTER
January 18, 2024    David Block  [address] [place] [id number]      Dear Mr. Block:    Your evaluation for transplantation was completed by our multi-disciplinary transplant team on 1/18/2024. I regret to inform you that the decision was not to proceed with placing you on the [facility] ([facility]) waiting list for a lung transplant.    Issues were identified by the committee that would jeopardize a successful transplant outcome. The reason(s) leading to this decision are:    age, vascular disease, CAD, need for CABG at time of transplant, deconditioning which together increase risk for post-operative complications.  Also concern for prostate mass.      A copy of this letter has been sent to your healthcare providers so they are also aware of our decision.  Please be in touch with your healthcare provider to make arrangements for your ongoing care.     If you have any questions regarding the decision, please call us at [phone].         Sincerely,      Mila Montague MD

## 2024-01-18 NOTE — COMMITTEE REVIEW
Presentation for Listing Note  Evaluation Date: 8/25/2023  Committee Review Date: 1/18/2024    Organ being evaluated for: Lung    Transplant Phase: Evaluation  Transplant Status: Active    Referring Physician: Dr. Delaney    Primary Diagnosis: IPF  Secondary Diagnosis:     Committee Review Decision: Declined      Committee Discussion Details:   The candidate's evaluation was presented and discussed at the Lung  Multidisciplinary Selection Conference. After review of the candidate's diagnosis and the evaluations of the multidisciplinary team members, it was the consensus of the Selection Committee that the candidate does not meet Lung Selection Criteria and is Declined for Lung transplant.       Patient was declined for lung transplant at this time due to age, vascular disease, CAD, need for CABG at time of transplant, deconditioning which together increase risk for post-operative complications.  Also concern for prostate mass, undergoing repeat biopsy on 1/31/24.

## 2024-01-22 ENCOUNTER — TELEPHONE (OUTPATIENT)
Dept: TRANSPLANT | Facility: HOSPITAL | Age: 74
End: 2024-01-22
Payer: MEDICARE

## 2024-01-22 NOTE — TELEPHONE ENCOUNTER
David Mckaylas daughter called with questions regarding his referral to [facility]. She mentioned that she spoke to Maria Luz about getting in contact with Dorie at the clinic to speak with David about next steps but has yet to hear back.

## 2024-01-22 NOTE — TELEPHONE ENCOUNTER
Called Mr. Block on 1/22/24 at 4:30pm, advised that a referral was initiated on Thursday 1/18/24 by Dr. Montague.     Provided [facility] phone number listed on [facility] Lung Transplant Website [phone]. All questions answered     PAST SURGICAL HISTORY:  H/O total hysterectomy

## 2024-01-25 ENCOUNTER — OFFICE VISIT (OUTPATIENT)
Dept: UROLOGY | Facility: CLINIC | Age: 74
End: 2024-01-25
Payer: MEDICARE

## 2024-01-25 VITALS
HEART RATE: 86 BPM | WEIGHT: 201.94 LBS | DIASTOLIC BLOOD PRESSURE: 67 MMHG | BODY MASS INDEX: 31.7 KG/M2 | RESPIRATION RATE: 18 BRPM | HEIGHT: 67 IN | SYSTOLIC BLOOD PRESSURE: 113 MMHG

## 2024-01-25 DIAGNOSIS — N42.89 PROSTATE MASS: Primary | ICD-10-CM

## 2024-01-25 PROCEDURE — 1036F TOBACCO NON-USER: CPT | Performed by: UROLOGY

## 2024-01-25 PROCEDURE — 1159F MED LIST DOCD IN RCRD: CPT | Performed by: UROLOGY

## 2024-01-25 PROCEDURE — 1160F RVW MEDS BY RX/DR IN RCRD: CPT | Performed by: UROLOGY

## 2024-01-25 PROCEDURE — 99204 OFFICE O/P NEW MOD 45 MIN: CPT | Performed by: UROLOGY

## 2024-01-25 PROCEDURE — 3074F SYST BP LT 130 MM HG: CPT | Performed by: UROLOGY

## 2024-01-25 PROCEDURE — 1126F AMNT PAIN NOTED NONE PRSNT: CPT | Performed by: UROLOGY

## 2024-01-25 PROCEDURE — 1157F ADVNC CARE PLAN IN RCRD: CPT | Performed by: UROLOGY

## 2024-01-25 PROCEDURE — 99214 OFFICE O/P EST MOD 30 MIN: CPT | Performed by: UROLOGY

## 2024-01-25 PROCEDURE — 3078F DIAST BP <80 MM HG: CPT | Performed by: UROLOGY

## 2024-01-25 ASSESSMENT — PAIN SCALES - GENERAL: PAINLEVEL: 0-NO PAIN

## 2024-01-25 NOTE — PROGRESS NOTES
History Of Present Illness  73-year-old male referred to me for prostate biopsy  PMH: Hypertension, hyperlipidemia, BPH, elevated PSA, erectile dysfunction    Patient is currently being listed for lung transplant. Dr. Montague reached out to me for consideration of repeat evaluation of his prostate.    Previously evaluated by my colleague, Dr. Urena in heme/onc.    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  Thought to be atypical enough that PI-RADS classification could not be completed.    PSA 12/19/2023: 2.84    Minimal to no LUTS. No gross hematuria.     Past Medical History  He has a past medical history of BPH (benign prostatic hyperplasia), Cancer of cheek (CMS/HCC), Hyperlipidemia, Hypertension, and Idiopathic pulmonary fibrosis (CMS/HCC).    Surgical History  He has a past surgical history that includes CT angio neck (11/13/2023); Knee surgery; Cystoscopy; Colonoscopy; and Back surgery.     Social History  He reports that he quit smoking about 14 years ago. His smoking use included cigarettes. He has never used smokeless tobacco. No history on file for alcohol use and drug use.    Family History  Family History   Problem Relation Name Age of Onset    Heart attack Father          Allergies  Nifedipine    ROS: 12 system review was completed and is negative with the exception of those signs and symptoms noted in the history of present illness: A 12 system review was completed and is negative with the exception of those signs and symptoms noted in the history of present illness.     Exam:  General: in NAD, appears stated age  Head: normocephalic, atraumatic  Respiratory: normal effort, no use of accessory muscles  Cardiovascular: no  edema noted  Skin: normal turgor, no rashes  Neurologic: grossly intact, oriented to person/place/time  Psychiatric: mode and affect appropriate     Last Recorded Vitals  There were no vitals taken for this visit.    Lab Results   Component Value Date    PSASCREEN 2.63 09/29/2023    CREATININE 0.91 12/19/2023    HGB 18.1 (H) 12/19/2023         ASSESSMENT/PLAN:  # Prostate nodule-PSA is reassuring.  MRI is unusual, report that notes a 4 cm mass that does not fit criteria for PI-RADS grading and has some atypical features.  In light of this, and his possible impending lung transplant with immunosuppression, the transplant team requested a prostate biopsy which I believe is reasonable.  -Plan for transperineal targeted biopsy only on 1/31/2024, cognitive fusion  -I described the procedure and recovery  -Increased risk for procedure due to patient's underlying pulmonary dysfunction    Greyson Proctor MD

## 2024-01-29 RX ORDER — PIRFENIDONE 267 MG/1
801 TABLET, FILM COATED ORAL 3 TIMES DAILY
COMMUNITY

## 2024-01-29 NOTE — PREPROCEDURE INSTRUCTIONS
Reviewed medical history and current medications. NPO after midnight. Patient verbalized understanding

## 2024-01-31 ENCOUNTER — ANESTHESIA (OUTPATIENT)
Dept: OPERATING ROOM | Facility: HOSPITAL | Age: 74
End: 2024-01-31
Payer: MEDICARE

## 2024-01-31 ENCOUNTER — ANESTHESIA EVENT (OUTPATIENT)
Dept: OPERATING ROOM | Facility: HOSPITAL | Age: 74
End: 2024-01-31
Payer: MEDICARE

## 2024-01-31 ENCOUNTER — HOSPITAL ENCOUNTER (OUTPATIENT)
Facility: HOSPITAL | Age: 74
Setting detail: OUTPATIENT SURGERY
Discharge: HOME | End: 2024-01-31
Attending: UROLOGY | Admitting: UROLOGY
Payer: MEDICARE

## 2024-01-31 VITALS
BODY MASS INDEX: 30.24 KG/M2 | SYSTOLIC BLOOD PRESSURE: 96 MMHG | TEMPERATURE: 96.6 F | OXYGEN SATURATION: 94 % | RESPIRATION RATE: 16 BRPM | HEART RATE: 72 BPM | WEIGHT: 192.68 LBS | DIASTOLIC BLOOD PRESSURE: 59 MMHG | HEIGHT: 67 IN

## 2024-01-31 DIAGNOSIS — R68.89 ABNORMAL DIGITAL RECTAL EXAM: ICD-10-CM

## 2024-01-31 PROCEDURE — 7100000010 HC PHASE TWO TIME - EACH INCREMENTAL 1 MINUTE: Performed by: UROLOGY

## 2024-01-31 PROCEDURE — 2500000004 HC RX 250 GENERAL PHARMACY W/ HCPCS (ALT 636 FOR OP/ED): Performed by: ANESTHESIOLOGY

## 2024-01-31 PROCEDURE — 3600000002 HC OR TIME - INITIAL BASE CHARGE - PROCEDURE LEVEL TWO: Performed by: UROLOGY

## 2024-01-31 PROCEDURE — 3600000007 HC OR TIME - EACH INCREMENTAL 1 MINUTE - PROCEDURE LEVEL TWO: Performed by: UROLOGY

## 2024-01-31 PROCEDURE — 7100000009 HC PHASE TWO TIME - INITIAL BASE CHARGE: Performed by: UROLOGY

## 2024-01-31 PROCEDURE — 55700 PR PROSTATE NEEDLE BIOPSY ANY APPROACH: CPT | Performed by: UROLOGY

## 2024-01-31 PROCEDURE — 2500000004 HC RX 250 GENERAL PHARMACY W/ HCPCS (ALT 636 FOR OP/ED): Performed by: UROLOGY

## 2024-01-31 PROCEDURE — G0416 PROSTATE BIOPSY, ANY MTHD: HCPCS | Performed by: PATHOLOGY

## 2024-01-31 PROCEDURE — 76942 ECHO GUIDE FOR BIOPSY: CPT | Performed by: UROLOGY

## 2024-01-31 PROCEDURE — 3700000001 HC GENERAL ANESTHESIA TIME - INITIAL BASE CHARGE: Performed by: UROLOGY

## 2024-01-31 PROCEDURE — 88305 TISSUE EXAM BY PATHOLOGIST: CPT | Mod: TC,SUR,ELYLAB | Performed by: UROLOGY

## 2024-01-31 PROCEDURE — 2500000005 HC RX 250 GENERAL PHARMACY W/O HCPCS: Performed by: UROLOGY

## 2024-01-31 PROCEDURE — 3700000002 HC GENERAL ANESTHESIA TIME - EACH INCREMENTAL 1 MINUTE: Performed by: UROLOGY

## 2024-01-31 PROCEDURE — 2720000007 HC OR 272 NO HCPCS: Performed by: UROLOGY

## 2024-01-31 RX ORDER — ONDANSETRON HYDROCHLORIDE 2 MG/ML
INJECTION, SOLUTION INTRAVENOUS AS NEEDED
Status: DISCONTINUED | OUTPATIENT
Start: 2024-01-31 | End: 2024-01-31

## 2024-01-31 RX ORDER — FENTANYL CITRATE 50 UG/ML
INJECTION, SOLUTION INTRAMUSCULAR; INTRAVENOUS AS NEEDED
Status: DISCONTINUED | OUTPATIENT
Start: 2024-01-31 | End: 2024-01-31

## 2024-01-31 RX ORDER — PROPOFOL 10 MG/ML
INJECTION, EMULSION INTRAVENOUS CONTINUOUS PRN
Status: DISCONTINUED | OUTPATIENT
Start: 2024-01-31 | End: 2024-01-31

## 2024-01-31 RX ORDER — BUPIVACAINE HYDROCHLORIDE 5 MG/ML
INJECTION, SOLUTION PERINEURAL AS NEEDED
Status: DISCONTINUED | OUTPATIENT
Start: 2024-01-31 | End: 2024-01-31 | Stop reason: HOSPADM

## 2024-01-31 RX ORDER — MIDAZOLAM HYDROCHLORIDE 1 MG/ML
INJECTION, SOLUTION INTRAMUSCULAR; INTRAVENOUS AS NEEDED
Status: DISCONTINUED | OUTPATIENT
Start: 2024-01-31 | End: 2024-01-31

## 2024-01-31 RX ORDER — SODIUM CHLORIDE 9 MG/ML
100 INJECTION, SOLUTION INTRAVENOUS CONTINUOUS
Status: DISCONTINUED | OUTPATIENT
Start: 2024-01-31 | End: 2024-01-31 | Stop reason: HOSPADM

## 2024-01-31 RX ORDER — KETAMINE HYDROCHLORIDE 10 MG/ML
INJECTION, SOLUTION INTRAMUSCULAR; INTRAVENOUS AS NEEDED
Status: DISCONTINUED | OUTPATIENT
Start: 2024-01-31 | End: 2024-01-31

## 2024-01-31 RX ORDER — GLYCOPYRROLATE 0.2 MG/ML
INJECTION INTRAMUSCULAR; INTRAVENOUS AS NEEDED
Status: DISCONTINUED | OUTPATIENT
Start: 2024-01-31 | End: 2024-01-31

## 2024-01-31 RX ORDER — CEFAZOLIN SODIUM 2 G/100ML
2 INJECTION, SOLUTION INTRAVENOUS ONCE
Status: COMPLETED | OUTPATIENT
Start: 2024-01-31 | End: 2024-01-31

## 2024-01-31 RX ORDER — ACETAMINOPHEN 325 MG/1
975 TABLET ORAL ONCE
Status: COMPLETED | OUTPATIENT
Start: 2024-01-31 | End: 2024-01-31

## 2024-01-31 RX ADMIN — SODIUM CHLORIDE, POTASSIUM CHLORIDE, SODIUM LACTATE AND CALCIUM CHLORIDE: 600; 310; 30; 20 INJECTION, SOLUTION INTRAVENOUS at 14:40

## 2024-01-31 RX ADMIN — ONDANSETRON 4 MG: 2 INJECTION, SOLUTION INTRAMUSCULAR; INTRAVENOUS at 15:06

## 2024-01-31 RX ADMIN — CEFAZOLIN SODIUM 2 G: 2 INJECTION, SOLUTION INTRAVENOUS at 15:11

## 2024-01-31 RX ADMIN — FENTANYL CITRATE 50 MCG: 50 INJECTION, SOLUTION INTRAMUSCULAR; INTRAVENOUS at 15:10

## 2024-01-31 RX ADMIN — KETAMINE HYDROCHLORIDE 20 MG: 10 INJECTION INTRAMUSCULAR; INTRAVENOUS at 15:11

## 2024-01-31 RX ADMIN — SODIUM CHLORIDE 100 ML/HR: 9 INJECTION, SOLUTION INTRAVENOUS at 12:41

## 2024-01-31 RX ADMIN — GLYCOPYRROLATE 0.2 MG: 0.2 INJECTION, SOLUTION INTRAMUSCULAR; INTRAVENOUS at 15:06

## 2024-01-31 RX ADMIN — ACETAMINOPHEN 975 MG: 325 TABLET ORAL at 12:41

## 2024-01-31 RX ADMIN — FENTANYL CITRATE 50 MCG: 50 INJECTION, SOLUTION INTRAMUSCULAR; INTRAVENOUS at 15:12

## 2024-01-31 RX ADMIN — MIDAZOLAM 2 MG: 1 INJECTION INTRAMUSCULAR; INTRAVENOUS at 15:10

## 2024-01-31 SDOH — HEALTH STABILITY: MENTAL HEALTH: CURRENT SMOKER: 0

## 2024-01-31 ASSESSMENT — PAIN SCALES - GENERAL
PAINLEVEL_OUTOF10: 0 - NO PAIN

## 2024-01-31 ASSESSMENT — PAIN - FUNCTIONAL ASSESSMENT
PAIN_FUNCTIONAL_ASSESSMENT: 0-10

## 2024-01-31 NOTE — H&P
History Of Present Illness  74 YO M w/ respiratory failure being evaluated for kidney transplant. Here for prostate biopsy no recent changes in health    Past Medical History  Past Medical History:   Diagnosis Date    Anxiety     BPH (benign prostatic hyperplasia)     Cancer of cheek (CMS/HCC)     Hyperlipidemia     Hypertension     Idiopathic pulmonary fibrosis (CMS/HCC)     IPF (idiopathic pulmonary fibrosis) (CMS/HCC)     lung transplant list, O2 4-5L    Skin cancer     Wears glasses     reading        Surgical History  Past Surgical History:   Procedure Laterality Date    BACK SURGERY      CARDIAC CATHETERIZATION      CAROTID ENDARTERECTOMY Left     COLONOSCOPY      CT ANGIO NECK  11/13/2023    CT ANGIO NECK 11/13/2023 [facility]    CYSTOSCOPY      KNEE ARTHROPLASTY Right     TRANSURETHRAL RESECTION OF BLADDER TUMOR          Social History  He reports that he quit smoking about 14 years ago. His smoking use included cigarettes. He has a 20.00 pack-year smoking history. He has never used smokeless tobacco. He reports that he does not currently use alcohol. He reports that he does not use drugs.    Family History  Family History   Problem Relation Name Age of Onset    Heart attack Father          Allergies  Nifedipine    ROS: 12 system review was completed and is negative with the exception of those signs and symptoms noted in the history of present illness: A 12 system review was completed and is negative with the exception of those signs and symptoms noted in the history of present illness.     Exam:  General: in NAD, appears stated age  Head: normocephalic, atraumatic  Respiratory: normal effort, no use of accessory muscles  Cardiovascular: no edema noted  Skin: normal turgor, no rashes  Neurologic: grossly intact, oriented to person/place/time  Psychiatric: mode and affect appropriate    Per anesthesiology, clear to auscultation bilaterally with a regular rate and rhythm     Last Recorded Vitals  /81  "Comment: SHABNAM, ADULT DISPOSABLE CUFF  Pulse 87   Temp 37.2 °C (99 °F) (Temporal)   Resp 18   Ht 1.702 m (5' 7\")   Wt 87.4 kg (192 lb 10.9 oz)   SpO2 96%   BMI 30.18 kg/m²       No results found for: \"URINECULTURE\", \"CREATININE\"      ASSESSMENT/PLAN:  Okay to proceed with prostate biopsy    Greyson Proctor MD    "

## 2024-01-31 NOTE — PERIOPERATIVE NURSING NOTE
Patient has baseline O2 sat of 85 on room air. He didn't bring his portable tank with him this morning. He uses oxygen at home when he becomes short of breath and has a oxygen monitor at home.  Dr. Wilkinson evaluated patient in [facility] post procedure and agreed patient was back to baseline. O2 sats on room air were running 84-89 at rest. Patient instructed to go straight home and rest.

## 2024-01-31 NOTE — OP NOTE
Biopsy Prostate needs fortec for ultrasound for transperineal biopsy Operative Note     Date: 2024  OR Location: [facility]    Name: David Block, : 1950, Age: 73 y.o., MRN: [id number], Sex: male    Diagnosis  Pre-op Diagnosis     * Abnormal digital rectal exam [R68.89] Post-op Diagnosis     * Abnormal digital rectal exam [R68.89]     Procedures  Biopsy Prostate needs fortec for ultrasound for transperineal biopsy  09753 - ND PROSTATE NEEDLE BIOPSY ANY APPROACH      Surgeons      * Greyson Proctor - Primary    Resident/Fellow/Other Assistant:  Surgeon(s) and Role:    Procedure Summary  Anesthesia: Monitor Anesthesia Care  ASA: IV  Anesthesia Staff: Anesthesiologist: Kaushal Wilkinson MD  CRNA: ELIOT Pineda-CRNA  Estimated Blood Loss: 3mL  Intra-op Medications: Administrations occurring from 1415 to 1500 on 24:  * No intraprocedure medications in log *           Anesthesia Record               Intraprocedure I/O Totals          Intake    Ketamine 0.00 mL    The total shown is the total volume documented since Anesthesia Start was filed.    LR bolus 600.00 mL    Total Intake 600 mL          Specimen:   ID Type Source Tests Collected by Time   1 : RIGHT PROSTATE MASS Tissue PROSTATE NEEDLE BIOPSY RIGHT SURGICAL PATHOLOGY EXAM Greyson Proctor MD 2024 1517        Staff:   Circulator: Bonita Edge RN  Relief Circulator: Maureen Alfaro RN  Scrub Person: Jes Wilcox    Operative Indications: 73year old male w/ complex medical history including pulmonary fibrosis, respiratory failure who is currently a transplant candidate.  As part of his transplant workup he was worked up for possible malignancies.  His PSA is reassuring, however he had a prostate nodule and an MRI showing an abnormal 4 cm growth on the right side of his prostate.  I have reviewed his images, I agree that it is an abnormally enlarged mass on the right side of his prostate however the appearance looks consistent with BPH  nodules.  Regardless, prior to his transplant his team would like him to have a biopsy to rule out malignancy before he will be immunosuppressed. The patient was counseled regarding the risks, benefits, alternatives, equipment, and personnel involved and consented to proceed with surgical intervention.    Operative Findings: Uncomplicated transperineal biopsy, LOU with abnormally enlarged right Daniel gland    Operative Procedure:   The patient was correctly identified and the operative plan was confirmed with the patient and the operative team. A weight appropriate dose of prophylactic antibiotics was administered intravenously prior to the procedure and sequential compression devices were applied to the lower extremities and activated prior to induction of anesthesia. The patient was placed in supine position.  Monitored anesthesia was induced. The patient was repositioned in dorsal lithotomy position. All pressure points were padded per protocol.    A digital rectal exam revealed: An abnormally enlarged right side of his prostate, not firm    The operative area was then prepped and draped in the usual sterile fashion.    The transrectal ultrasound probe was inserted into the rectum.  The perineal skin was infiltrated with 5 cc of 0.25% Marcaine plain on both sides of the perineal raphae.  A bilateral pudendal nerve block was performed using the precision point guide.  Using a 22-gauge, 7 inch spinal needle 10 cc of 0.25% Marcaine were infiltrated on either side of the pelvic musculature towards the lateral aspect of the prostate as well as the needle tracks on the right side.  In total, 30 cc of Marcaine were used.    I inspected the prostate and noted that the massive 4 x 4 centimeter mass on the right side of his prostate appeared abnormal with internal fluid compartments consistent with cystic structures    I took 4-5 samples from the prostate mass only with an 18-gauge biopsy gun.  I opted not to biopsy the  remainder of his prostate due to lack of suspicious lesions on MRI and low PSA    Counts were correct. Sign-out was performed with the surgical team confirming the specimen listed below. The patient tolerated the procedure well, emerged from anesthesia without incident, and was transferred to PACU in stable condition.     KALEE (Greyson Proctor MD) performed the procedure    Greyson Proctor  Phone Number: [phone]

## 2024-01-31 NOTE — ANESTHESIA POSTPROCEDURE EVALUATION
Patient: David Block    Procedure Summary       Date: 01/31/24 Room / Location: [facility] OR 08 / [facility]    Anesthesia Start: 1506 Anesthesia Stop:     Procedure: Biopsy Prostate needs fortec for ultrasound for transperineal biopsy Diagnosis:       Abnormal digital rectal exam      (Abnormal digital rectal exam [R68.89])    Surgeons: Greyson Proctor MD Responsible Provider: Kaushal Wilkinson MD    Anesthesia Type: MAC ASA Status: 4            Anesthesia Type: MAC    Vitals Value Taken Time   /66 01/31/24 1525   Temp 36 01/31/24 1525   Pulse 75 01/31/24 1525   Resp 20 01/31/24 1525   SpO2 93 01/31/24 1525       Anesthesia Post Evaluation    Patient location during evaluation: PACU  Patient participation: complete - patient participated  Level of consciousness: awake and alert  Pain management: adequate  Airway patency: patent  Cardiovascular status: acceptable, hemodynamically stable and blood pressure returned to baseline  Respiratory status: acceptable, nasal cannula, spontaneous ventilation and nonlabored ventilation  Hydration status: acceptable  Postoperative Nausea and Vomiting: none      There were no known notable events for this encounter.     Pt stated medication was not approved when she called the pharmacy.    Called pharmacy to see if a PA was required. Pharmacy states they did not receive script that was sent on 3/9. Can this please be re-sent? Thank you.

## 2024-01-31 NOTE — DISCHARGE INSTRUCTIONS
Prostate Biopsy Discharge Instructions    About this topic  The prostate is a part of your body that helps make semen. The prostate is located at the base of the penis and in front of the rectum.  Prostate biopsy is done:  To help your doctor know if the lump or tumor in your prostate is cancer or not.  If your blood test, called PSA or prostate specific antigen, is high. High PSA in the blood means disease in the prostate.  During a prostate biopsy, the doctor uses a needle to collect pieces of tissue from the prostate. The doctor sends the tissue to the lab. The lab then checks the tissue for infection or cancer.    What care is needed at home?  Ask your doctor what you need to do when you go home. Make sure you ask questions if you do not understand what the doctor says. This way you will know what you need to do.  Rest after the procedure to prevent bleeding from the biopsy site. Avoid activities like heavy lifting and hard exercise.  You may see some blood in your urine or stools for the next few days. You may also have blood in your semen for a few weeks.  Drink up to 8 glasses of water a day to help flush out blood.  Use an ice pack to help with the pain and to help stop the bleeding for the first 2 days after the procedure. Place an ice pack or a bag of frozen peas wrapped in a towel over the painful part. Never put ice right on the skin. Do not leave the ice on more than 10 to 15 minutes at a time. Use ice each hour as needed.  Keep your rectal opening and penis clean to prevent infection.  Keep your wound dry for the next 24 hours. Ask your doctor about when you may take a bath or shower.  Change the dressing if it gets soaked.  What follow-up care is needed?  Your doctor may ask you to make visits to the office to check on your progress. Be sure to keep your visits.  It may take up to 2 weeks for your doctor to get the results. You may be asked to return to the doctor's office for the result of the biopsy  in 2 to 3 weeks. The results will help your doctor understand what kind of problem you have with your prostate. Together you can make a plan for more care.  Your doctor will talk with you if any other treatment is needed.  What drugs may be needed?  The doctor may order drugs to:  Help with pain  Prevent infection  Will physical activity be limited?  Avoid doing activities that may put pressure on your rectal area for the next 7 days. You may be more comfortable if you do not ride a bike, horse, or motorcycle.  Limit your sexual activity for a few days after the procedure. Ask your doctor when you can have sex.  Do not strain when going to the bathroom. Don't hold your urine. Holding back from urinating can irritate your bladder and lead to a urinary tract infection.  Avoid constipation by eating foods high in fiber and staying hydrated. Straining to pass stool can worsen your symptoms as you heal.  What problems could happen?  Infection at the biopsy site  Infection elsewhere in your body  Bleeding from your rectum, or blood in your urine or semen  Tumor spread  Very bad pain  Bladder or rectum perforation  Urinary tract infection  Trouble passing urine  Erectile dysfunction  Reduced sexual activity  When do I need to call the doctor?  Signs of infection like fever of 100.4°F (38°C) or higher, chills, burning or pain when you pass urine.  No urine or more problems passing urine  Dizziness, confusion, or weakness  Yellowish, greenish, or bloody discharge from the penis  Lots of rectal bleeding or large amounts of blood in the urine  Pain does not go away even after taking your drugs  Teach Back: Helping You Understand  The Teach Back Method helps you understand the information we are giving you. After you talk with the staff, tell them in your own words what you learned. This helps to make sure the staff has described each thing clearly. It also helps to explain things that may have been confusing. Before going  home, make sure you can do these:  I can tell you about my procedure.  I can tell you what may help ease my pain.  I can tell you what I will do if I have a fever, chills, problems passing urine, or drainage from my penis.  Last Reviewed Date  2021-05-18  Consumer Information Use and Disclaimer  This generalized information is a limited summary of diagnosis, treatment, and/or medication information. It is not meant to be comprehensive and should be used as a tool to help the user understand and/or assess potential diagnostic and treatment options. It does NOT include all information about conditions, treatments, medications, side effects, or risks that may apply to a specific patient. It is not intended to be medical advice or a substitute for the medical advice, diagnosis, or treatment of a health care provider based on the health care provider's examination and assessment of a patient’s specific and unique circumstances. Patients must speak with a health care provider for complete information about their health, medical questions, and treatment options, including any risks or benefits regarding use of medications. This information does not endorse any treatments or medications as safe, effective, or approved for treating a specific patient. [employer] Inc[employer] and its affiliates disclaim any warranty or liability relating to this information or the use thereof. The use of this information is governed by the Terms of Use, available at https://www.[employer].com/en/know/clinical-effectiveness-terms  Copyright © 2024 [employer] Inc[employer] and its affiliates and/or licensors. All rights reserved.    Moderate Sedation in Adults Discharge Instructions    About this topic  Moderate sedation is also known as conscious sedation. It changes your state of being awake or consciousness. With this sedation, you may feel slight pain or pressure during a procedure. The drugs help you to relax and may even allow you to sleep. It will be  easy to wake you and you may talk and answer questions while under sedation. Most likely, you will not remember what happens while under this sedation.  What care is needed at home?  Ask your doctor what you need to do when you go home. Make sure you understand everything the doctor says. This way you will know what you need to do.  You will not be allowed to drive right away after the procedure. Ask a family member or a friend to drive you home.  Do not operate heavy or dangerous machinery for at least 24 hours.  Do not make major decisions or sign important papers for at least 24 hours. You may not be thinking clearly.  Avoid beer, wine, or mixed drinks (alcohol) for at least 24 hours.  You are at a higher risk of falling for at least 24 hours after moderate sedation.  Take extra care when you get up.  Do not change positions quickly.  Do not rush when you need to go to the bathroom or to answer the phone.  Ask for help if you feel unsteady when you try to walk.  Wear shoes with non-slip soles and low heels.  What follow-up care is needed?  Your doctor may ask you to make visits to the office to check on your progress. Be sure to keep these visits. Your doctor may also refer you to other doctors or tell you that you need more tests or care.  What drugs may be needed?  The doctor may order drugs to:  Help with pain  Treat an upset stomach or throwing up  Will physical activity be limited?  Rest for the day of the procedure. Avoid strenuous activities like heavy lifting and hard exercise. Talk to your doctor about whether you need to limit lifting or exercise after your procedure.  What changes to diet are needed?  Start with a light diet when you are fully awake. This includes things that are easy to swallow like soups, pudding, jello, toast, and eggs. Slowly progress to your normal diet.  What problems could happen?  Low blood pressure  Breathing problems  Upset stomach or throwing up  Dizziness  When do I need to  call the doctor?  Feel dizzy, weak, or tired  Faint  Very bad headache  Upset stomach or throwing up  To follow up for more tests or care  Teach Back: Helping You Understand  The Teach Back Method helps you understand the information we are giving you. After you talk with the staff, tell them in your own words what you learned. This helps to make sure the staff has described each thing clearly. It also helps to explain things that may have been confusing. Before going home, make sure you can do these:  I can tell you about my procedure.  I can tell you if I need more tests or care.  I can tell you what is good for me to eat and drink the next day.  I can tell you what I would do if I feel dizzy, weak, or tired.  Last Reviewed Date  2020-03-02  Consumer Information Use and Disclaimer  This generalized information is a limited summary of diagnosis, treatment, and/or medication information. It is not meant to be comprehensive and should be used as a tool to help the user understand and/or assess potential diagnostic and treatment options. It does NOT include all information about conditions, treatments, medications, side effects, or risks that may apply to a specific patient. It is not intended to be medical advice or a substitute for the medical advice, diagnosis, or treatment of a health care provider based on the health care provider's examination and assessment of a patient’s specific and unique circumstances. Patients must speak with a health care provider for complete information about their health, medical questions, and treatment options, including any risks or benefits regarding use of medications. This information does not endorse any treatments or medications as safe, effective, or approved for treating a specific patient. Up[employer]Date, Inc[employer] and its affiliates disclaim any warranty or liability relating to this information or the use thereof. The use of this information is governed by the Terms of Use, available  at https://www.[employer].com/en/know/clinical-effectiveness-terms  Copyright © 2024 [employer] and its affiliates and/or licensors. All rights reserved.

## 2024-01-31 NOTE — ANESTHESIA PREPROCEDURE EVALUATION
Patient: David Block    Procedure Information       Date/Time: 01/31/24 1415    Procedure: Biopsy Prostate needs fortec for ultrasound for transperineal biopsy - needs fortec for ultrasound for transperineal biopsy    Location: [facility] / [facility]    Surgeons: Greyson Proctor MD            Relevant Problems   Anesthesia (within normal limits)      Cardiovascular   (+) Coronary artery disease   (+) HTN (hypertension)   (+) Hypercholesterolemia   (+) Other secondary pulmonary hypertension (CMS/HCC)   (+) Symptomatic stenosis of left carotid artery      Endocrine (within normal limits)      GI   (+) Esophageal reflux      /Renal (within normal limits)      Neuro/Psych   (+) Symptomatic stenosis of left carotid artery      Pulmonary  Pt. Awaiting lung transplant.  H/o Idiopathic pulmonary fibrosis.    (+) Other secondary pulmonary hypertension (CMS/HCC)      GI/Hepatic (within normal limits)      Hematology (within normal limits)      Musculoskeletal   (+) Osteoarthritis of knee      Eyes, Ears, Nose, and Throat (within normal limits)      Infectious Disease (within normal limits)       Clinical information reviewed:   Tobacco  Allergies  Meds   Med Hx  Surg Hx   Fam Hx  Soc Hx        NPO Detail:  NPO/Void Status  Carbohydrate Drink Given Prior to Surgery? : N  Date of Last Liquid: 01/30/24  Time of Last Liquid: 2300  Date of Last Solid: 01/30/24  Time of Last Solid: 1900  Last Intake Type: Clear fluids  Time of Last Void: 0830         Physical Exam    Airway  Mallampati: II     Cardiovascular - normal exam  Rhythm: regular     Dental - normal exam  (+) upper dentures, lower dentures     Pulmonary - normal exam  (+) decreased breath sounds  Comments: Home O2 2ltr NC.  Baseline O2 saturation 78-84%   Abdominal - normal exam             Anesthesia Plan    History of general anesthesia?: yes  History of complications of general anesthesia?: no    ASA 4     MAC     The patient is not a current  smoker.    intravenous induction   Anesthetic plan and risks discussed with patient.    Plan discussed with CRNA.

## 2024-02-14 ENCOUNTER — TELEPHONE (OUTPATIENT)
Dept: PULMONOLOGY | Facility: HOSPITAL | Age: 74
End: 2024-02-14
Payer: MEDICARE

## 2024-02-14 DIAGNOSIS — J84.9 ILD (INTERSTITIAL LUNG DISEASE) (MULTI): ICD-10-CM

## 2024-02-14 NOTE — TELEPHONE ENCOUNTER
Pt called regarding his Esbriet. He stated that he was denied a lung transplant and wasn't sure which provider would continue to prescribe it. It was explained that Dr. Delaney will continue to be his pulmonologist and prescribe this medication. Upon chart review, pt didn't have an upcoming appointment with Dr. Delaney. An appointment was set up on 3/29/24 with Dr. Delaney. Orders for PFT's and blood work were placed and explained to the pt. He verbalized understanding. He is going to try and schedule the PFT's the same day as his appointment with Dr. Delaney. He stated that he would like to get the blood work closer to home. He is seeing his PCP tomorrow and will ask for a fax number to send the blood work order to the office. Pt verbalized understanding of plan of care and confirmed date, time and location of his appointment with Dr. Delaney. He will call the office back tomorrow with more information regarding getting blood drawn.

## 2024-02-20 ENCOUNTER — TELEPHONE (OUTPATIENT)
Dept: PULMONOLOGY | Facility: HOSPITAL | Age: 74
End: 2024-02-20
Payer: MEDICARE

## 2024-02-21 NOTE — TELEPHONE ENCOUNTER
Spoke with pt regarding an update on sending records to his PCP, Dr. Juárez. Confirmed with Radha from Dr. Juárez's office that records were received along with order for HFP. She stated that in the future, lab orders can be faxed to Dr. Juárez's office so pt doesn't have to travel far to get blood drawn. Pt was updated and he verbalized understanding.

## 2024-02-22 ENCOUNTER — TELEMEDICINE (OUTPATIENT)
Dept: UROLOGY | Facility: CLINIC | Age: 74
End: 2024-02-22
Payer: MEDICARE

## 2024-02-22 DIAGNOSIS — R68.89 ABNORMAL DIGITAL RECTAL EXAM: Primary | ICD-10-CM

## 2024-02-22 PROCEDURE — 1157F ADVNC CARE PLAN IN RCRD: CPT | Performed by: UROLOGY

## 2024-02-22 PROCEDURE — 1160F RVW MEDS BY RX/DR IN RCRD: CPT | Performed by: UROLOGY

## 2024-02-22 PROCEDURE — 99213 OFFICE O/P EST LOW 20 MIN: CPT | Performed by: UROLOGY

## 2024-02-22 PROCEDURE — 1036F TOBACCO NON-USER: CPT | Performed by: UROLOGY

## 2024-02-22 PROCEDURE — 1126F AMNT PAIN NOTED NONE PRSNT: CPT | Performed by: UROLOGY

## 2024-02-22 PROCEDURE — 1159F MED LIST DOCD IN RCRD: CPT | Performed by: UROLOGY

## 2024-02-22 NOTE — PROGRESS NOTES
PRIOR NOTES  73-year-old male referred to me for prostate biopsy  PMH: Hypertension, hyperlipidemia, BPH, elevated PSA, erectile dysfunction     Patient is currently being listed for lung transplant. Dr. Montague reached out to me for consideration of repeat evaluation of his prostate.     Previously evaluated by my colleague, Dr. Urena in heme/onc.     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  Thought to be atypical enough that PI-RADS classification could not be completed.     PSA 12/19/2023: 2.84     Minimal to no LUTS. No gross hematuria.    OR 1/31/2024-uncomplicated targeted biopsy only  Pathology all benign    UPDATED SUBJECTIVE HISTORY  02/22/24 -here to discuss pathology results    Past Medical History  He has a past medical history of Anxiety, BPH (benign prostatic hyperplasia), Cancer of cheek (CMS/HCC), Hyperlipidemia, Hypertension, Idiopathic pulmonary fibrosis (CMS/HCC), IPF (idiopathic pulmonary fibrosis) (CMS/HCC), Skin cancer, and Wears glasses.    Surgical History  He has a past surgical history that includes CT angio neck (11/13/2023); Knee Arthroplasty (Right); Cystoscopy; Colonoscopy; Back surgery; Cardiac catheterization; Transurethral resection of bladder tumor; and Carotid endarterectomy (Left).     Social History  He reports that he quit smoking about 14 years ago. His smoking use included cigarettes. He has a 20.00 pack-year smoking history. He has never used smokeless tobacco. He reports that he does not currently use alcohol. He reports that he does not use drugs.    Family History  Family History   Problem Relation Name Age of Onset    Heart attack Father          Allergies  Nifedipine    ROS: 12 system  review was completed and is negative with the exception of those signs and symptoms noted in the history of present illness: A 12 system review was completed and is negative with the exception of those signs and symptoms noted in the history of present illness.     Exam:  General: in NAD, appears stated age  Head: normocephalic, atraumatic  Respiratory: normal effort, no use of accessory muscles  Cardiovascular: no edema noted  Skin: normal turgor, no rashes  Neurologic: grossly intact, oriented to person/place/time  Psychiatric: mode and affect appropriate    Conducted virtually     Last Recorded Vitals  There were no vitals taken for this visit.    Lab Results   Component Value Date    PSASCREEN 2.63 09/29/2023    CREATININE 0.91 12/19/2023    HGB 18.1 (H) 12/19/2023         ASSESSMENT/PLAN:  # Abnormal asymmetric prostate with prostate mass on MRI unable to assign PI-RADS warning.  Patient is being evaluated for lung transplant, I was asked to biopsy the prostate to rule out malignancy prior to possible transplantation  -Prostate benign  -No indication of prostate cancer  -Patient is asymptomatic from his BPH, follows with Dr. James Villagran at [facility]    Greyson Proctor MD

## 2024-02-26 ENCOUNTER — TELEPHONE (OUTPATIENT)
Dept: CARDIOLOGY | Facility: CLINIC | Age: 74
End: 2024-02-26
Payer: MEDICARE

## 2024-02-26 NOTE — TELEPHONE ENCOUNTER
Received patient information from Greer @ [facility]. States patient had been referred to them for stent. States [facility] does not place stents and patient would benefit to see Dr Crouch. In review of chart , patient has been followed with the  lung transplant team. Heart cath was preformed and patient was sent to [facility] for second opinion for stent vs cabg. Spoke to Claudette Gonzalez  Pulmonary transplant team. Discussed case. States she was unclear if patient was to be  cardiology, will fax [facility] notes for review.    After review, message left for Claudette requesting she review records and  refer patient to  cardio-thoracic surgeon.  Patient previously seen  Dr Helm. Spoke to patient instructed to call Claudette for further guidance. Instructed patient that I will check back to make sure he is taken care.

## 2024-03-06 NOTE — TELEPHONE ENCOUNTER
Patient called to report that he can't get in until May and he doestn want to drive all the way up to [place]. He was wondering if he could have an OV with Dr. Fritz Crouch, DO to discuss this more.

## 2024-03-07 NOTE — TELEPHONE ENCOUNTER
Chart reviewed . Patient is no longer on transplant list. States he would like Dr Crouch to review his heart cath and give him some direction. States he does not want to follow with cardiologist that preformed heart cath due to location. O/V SCHEDULED with Dr Crouch.  Heart cath preformed at .

## 2024-03-19 ENCOUNTER — OFFICE VISIT (OUTPATIENT)
Dept: CARDIOLOGY | Facility: CLINIC | Age: 74
End: 2024-03-19
Payer: MEDICARE

## 2024-03-19 VITALS
HEIGHT: 67 IN | WEIGHT: 199 LBS | BODY MASS INDEX: 31.23 KG/M2 | SYSTOLIC BLOOD PRESSURE: 124 MMHG | HEART RATE: 64 BPM | DIASTOLIC BLOOD PRESSURE: 82 MMHG

## 2024-03-19 DIAGNOSIS — Z87.891 FORMER SMOKER: ICD-10-CM

## 2024-03-19 DIAGNOSIS — I10 HYPERTENSION, UNSPECIFIED TYPE: ICD-10-CM

## 2024-03-19 DIAGNOSIS — J84.112 IPF (IDIOPATHIC PULMONARY FIBROSIS) (MULTI): ICD-10-CM

## 2024-03-19 DIAGNOSIS — R07.89 CHEST DISCOMFORT: ICD-10-CM

## 2024-03-19 DIAGNOSIS — Z98.890 HISTORY OF LEFT-SIDED CAROTID ENDARTERECTOMY: ICD-10-CM

## 2024-03-19 DIAGNOSIS — I25.10 ASHD (ARTERIOSCLEROTIC HEART DISEASE): ICD-10-CM

## 2024-03-19 PROCEDURE — 1036F TOBACCO NON-USER: CPT | Performed by: INTERNAL MEDICINE

## 2024-03-19 PROCEDURE — 3079F DIAST BP 80-89 MM HG: CPT | Performed by: INTERNAL MEDICINE

## 2024-03-19 PROCEDURE — 1160F RVW MEDS BY RX/DR IN RCRD: CPT | Performed by: INTERNAL MEDICINE

## 2024-03-19 PROCEDURE — 1157F ADVNC CARE PLAN IN RCRD: CPT | Performed by: INTERNAL MEDICINE

## 2024-03-19 PROCEDURE — 1159F MED LIST DOCD IN RCRD: CPT | Performed by: INTERNAL MEDICINE

## 2024-03-19 PROCEDURE — 99205 OFFICE O/P NEW HI 60 MIN: CPT | Performed by: INTERNAL MEDICINE

## 2024-03-19 PROCEDURE — 3008F BODY MASS INDEX DOCD: CPT | Performed by: INTERNAL MEDICINE

## 2024-03-19 PROCEDURE — 3074F SYST BP LT 130 MM HG: CPT | Performed by: INTERNAL MEDICINE

## 2024-03-19 RX ORDER — METOPROLOL SUCCINATE 25 MG/1
12.5 TABLET, EXTENDED RELEASE ORAL 2 TIMES DAILY
Qty: 90 TABLET | Refills: 3 | Status: SHIPPED | OUTPATIENT
Start: 2024-03-19 | End: 2024-04-18

## 2024-03-19 RX ORDER — NITROGLYCERIN 0.4 MG/1
0.4 TABLET SUBLINGUAL EVERY 5 MIN PRN
Qty: 90 TABLET | Refills: 12 | Status: SHIPPED | OUTPATIENT
Start: 2024-03-19 | End: 2025-03-19

## 2024-03-19 RX ORDER — NITROGLYCERIN 40 MG/1
1 PATCH TRANSDERMAL DAILY
Qty: 90 PATCH | Refills: 3 | Status: SHIPPED | OUTPATIENT
Start: 2024-03-19 | End: 2024-05-21 | Stop reason: ALTCHOICE

## 2024-03-19 NOTE — PROGRESS NOTES
Subjective   David Block is a 73 y.o. male              73-year-old male seen as a new patient to me for the first time, previously has been seen in the  cardiology, vascular surgery and pulmonary divisions at [facility]; details of those individual procedures, workups and evaluations are reviewed extensively.    Patient seeking out further evaluation and management in regards to coronary artery disease in regards to hopeful successful transition to possible pulmonary transplant for diagnosis of IPF/COPD.    Heart catheterization already performed by Dr. Cyril Quintana revealing moderate circumflex and severe right coronary artery disease per report; images not available presently and will need to review further.  Patient has been searching out to his care providers and trying to obtain follow-up information in regards to his transplantation evaluation progress as well as follow-up with Dr. Quintana which has been arranged in May 2024.    As he explains to me, he has been deemed too high risk for cardiopulmonary transplantation/pulmonary transplantation either with [facility] or [facility] details of which are unavailable and unknown at the present time.    Details of his pulmonary evaluation, reduced DLCO, progressive exertional dyspnea, CT imaging results, recent left carotid endarterectomy in November 2023 and preoperative cardiac catheterization report, echocardiography and right heart catheterization reports reviewed.    Simply from a cardiovascular standpoint, given his age, comorbidities, and coronary artery disease and vascular disease, he does present as a higher risk transplant candidate; even potentially excluded for his high risk features.    In any event he does have a progressive, daily exertional dyspnea even with putting on clothes; he mentions episodic angina.  He was unable to find anyone to refill his beta-blocker and has no nitrate available.  He states recently during oximetry measurement  "this past week his heart rate was as high as 110 beats a minute; this is likely due to beta-blocker withdrawal and hypoxemia.    Recommendations: Will contact Dr. Michele Guzman and Dr. Quintana in regards to his follow-up and candidacy or lack of candidacy for transplantation, obtain cardiac catheterization images, initiate metoprolol 12.5 twice daily in addition and Nitro-Dur patch 0.2 daily with sublingual nitrate as needed with instructions, follow-up in 8 weeks.  As Dr. Quintana is already proceeded with cardiac catheterization and notes his anatomy well, will offer revascularization back to [facility] [place] initially unless the patient wants to proceed locally.         Review of Systems   All other systems reviewed and are negative.           Vitals:    03/19/24 1140   BP: 124/82   BP Location: Left arm   Patient Position: Sitting   Pulse: 64   Weight: 90.3 kg (199 lb)   Height: 1.702 m (5' 7\")        Objective   Physical Exam  Constitutional:       Appearance: Normal appearance.   HENT:      Nose: Nose normal.   Neck:      Vascular: No carotid bruit.   Cardiovascular:      Rate and Rhythm: Normal rate.      Pulses: Normal pulses.      Heart sounds: Normal heart sounds.   Pulmonary:      Effort: Pulmonary effort is normal.   Abdominal:      General: Bowel sounds are normal.      Palpations: Abdomen is soft.   Musculoskeletal:         General: Normal range of motion.      Cervical back: Normal range of motion.      Right lower leg: No edema.      Left lower leg: No edema.   Skin:     General: Skin is warm and dry.   Neurological:      General: No focal deficit present.      Mental Status: He is alert.   Psychiatric:         Mood and Affect: Mood normal.         Behavior: Behavior normal.         Thought Content: Thought content normal.         Judgment: Judgment normal.         Allergies  Nifedipine     Current Medications    Current Outpatient Medications:     acetaminophen (Tylenol) 325 mg capsule, Take 1 capsule (325 mg) by mouth " every 6 hours if needed., Disp: , Rfl:     aspirin 81 mg capsule, Take 1 capsule by mouth once daily., Disp: , Rfl:     b complex vitamins capsule, Take 1 capsule by mouth once daily., Disp: , Rfl:     cholecalciferol (Vitamin D-3) 125 MCG (5000 UT) capsule, Take 1 capsule (125 mcg) by mouth once daily., Disp: , Rfl:     diphenhydrAMINE-acetaminophen (Tylenol PM)  mg per tablet, Take 1 tablet by mouth as needed at bedtime for sleep., Disp: , Rfl:     escitalopram (Lexapro) 5 mg tablet, Take 1 tablet (5 mg) by mouth once daily., Disp: , Rfl:     hydroCHLOROthiazide (HYDRODiuril) 25 mg tablet, Take 1 tablet (25 mg) by mouth once daily in the morning., Disp: , Rfl:     losartan (Cozaar) 25 mg tablet, Take 1 tablet (25 mg) by mouth once daily., Disp: , Rfl:     multivitamin/iron/folic acid (CENTRUM ORAL), Take 1 tablet by mouth once daily., Disp: , Rfl:     oxygen (O2) gas therapy, Inhale. 4L WITH ACTIVITY; 2L WITH REST AS NEEDED; DOES NOT USE O2 AT NIGHT, Disp: , Rfl:     pirfenidone (Esbriet) 267 mg tablet tablet, Take 3 tablets (801 mg) by mouth 3 times a day., Disp: , Rfl:     atorvastatin (Lipitor) 80 mg tablet, Take 1 tablet (80 mg) by mouth once daily., Disp: 30 tablet, Rfl: 0                     Assessment/Plan   1. ASHD (arteriosclerotic heart disease)        2. IPF (idiopathic pulmonary fibrosis) (CMS/[facility])        3. History of left-sided carotid endarterectomy        4. BMI 31.0-31.9,adult        5. Former smoker        6. Chest discomfort        7. Hypertension, unspecified type                 Scribe Attestation  By signing my name below, IMónica LPN, Scribe   attest that this documentation has been prepared under the direction and in the presence of Fritz Crouch DO.     Provider Attestation - Scribe documentation    All medical record entries made by the Scribe were at my direction and personally dictated by me. I have reviewed the chart and agree that the record accurately reflects my  personal performance of the history, physical exam, discussion and plan.

## 2024-03-19 NOTE — PATIENT INSTRUCTIONS
Current weight: 90.3 kg (199 lb)  Weight change since last visit (-) denotes wt loss 6.32 lbs   Weight loss needed to achieve BMI 25: 39.7 Lbs  Weight loss needed to achieve BMI 30: 7.9 Lbs  Please bring all medicines, vitamins, and herbal supplements with you when you come to the office.    Prescriptions will not be filled unless you are compliant with your follow up appointments or have a follow up appointment scheduled as per instruction of your physician. Refills should be requested at the time of your visit.

## 2024-03-22 ENCOUNTER — TELEPHONE (OUTPATIENT)
Dept: CARDIOLOGY | Facility: CLINIC | Age: 74
End: 2024-03-22
Payer: MEDICARE

## 2024-03-22 NOTE — TELEPHONE ENCOUNTER
Patient called to report that he called 911 and is currently in the [facility] [place] and wanted Dr. Fritz Crouch DO and EMILY Jensen RN to be aware

## 2024-03-26 ENCOUNTER — APPOINTMENT (OUTPATIENT)
Dept: CARDIOLOGY | Facility: CLINIC | Age: 74
End: 2024-03-26
Payer: MEDICARE

## 2024-03-27 ENCOUNTER — HOSPITAL ENCOUNTER (OUTPATIENT)
Dept: HOSPITAL 101 - CR | Age: 74
LOS: 93 days | Discharge: HOME | End: 2024-06-28
Payer: MEDICARE

## 2024-03-27 DIAGNOSIS — J84.112: Primary | ICD-10-CM

## 2024-03-27 PROCEDURE — G0239 OTH RESP PROC, GROUP: HCPCS

## 2024-03-28 ENCOUNTER — TELEPHONE (OUTPATIENT)
Dept: CARDIOLOGY | Facility: CLINIC | Age: 74
End: 2024-03-28
Payer: MEDICARE

## 2024-03-28 NOTE — TELEPHONE ENCOUNTER
Patient called into office to follow up with if he needs to go to Dr. Lewis or if Dr. Fritz Crouch, DO can do testing/procedure locally (mentioned in OV note). States that he is having tightness from shoulder on and shortness of breath at time. Advised that per Zulma PAVON RN and Dr. Fritz Crouch, DO he is going to review all his recent films of testing and be in contact with him If he can be handled locally or not. Did advise though if his symptoms worsen and do not go away to report to the ER for help. Patient verbalized understanding.    Also advised to hold off on cardiac rehab until information reviewed and that they will be in contact with him early next week. Patient verbalized understanding.

## 2024-03-29 ENCOUNTER — OFFICE VISIT (OUTPATIENT)
Dept: PULMONOLOGY | Facility: HOSPITAL | Age: 74
End: 2024-03-29
Payer: MEDICARE

## 2024-03-29 ENCOUNTER — HOSPITAL ENCOUNTER (OUTPATIENT)
Dept: RESPIRATORY THERAPY | Facility: HOSPITAL | Age: 74
Discharge: HOME | End: 2024-03-29
Payer: MEDICARE

## 2024-03-29 VITALS
SYSTOLIC BLOOD PRESSURE: 98 MMHG | DIASTOLIC BLOOD PRESSURE: 63 MMHG | HEART RATE: 82 BPM | OXYGEN SATURATION: 91 % | BODY MASS INDEX: 29.99 KG/M2 | TEMPERATURE: 97.6 F | WEIGHT: 191.5 LBS

## 2024-03-29 DIAGNOSIS — J43.2 CENTRILOBULAR EMPHYSEMA (MULTI): ICD-10-CM

## 2024-03-29 DIAGNOSIS — J84.9 ILD (INTERSTITIAL LUNG DISEASE) (MULTI): ICD-10-CM

## 2024-03-29 DIAGNOSIS — J84.9 ILD (INTERSTITIAL LUNG DISEASE) (MULTI): Primary | ICD-10-CM

## 2024-03-29 DIAGNOSIS — I25.10 ASHD (ARTERIOSCLEROTIC HEART DISEASE): ICD-10-CM

## 2024-03-29 DIAGNOSIS — J96.11 CHRONIC RESPIRATORY FAILURE WITH HYPOXIA (MULTI): ICD-10-CM

## 2024-03-29 DIAGNOSIS — J96.12 CHRONIC RESPIRATORY FAILURE WITH HYPERCAPNIA (MULTI): ICD-10-CM

## 2024-03-29 LAB
MGC ASCENT PFT - FEV1 - PRE: 2.52
MGC ASCENT PFT - FEV1 - PRE: 2.52
MGC ASCENT PFT - FEV1 - PREDICTED: 2.71
MGC ASCENT PFT - FEV1 - PREDICTED: 2.71
MGC ASCENT PFT - FVC - PRE: 3.54
MGC ASCENT PFT - FVC - PRE: 3.54
MGC ASCENT PFT - FVC - PREDICTED: 3.57
MGC ASCENT PFT - FVC - PREDICTED: 3.57

## 2024-03-29 PROCEDURE — 1126F AMNT PAIN NOTED NONE PRSNT: CPT | Performed by: INTERNAL MEDICINE

## 2024-03-29 PROCEDURE — 1160F RVW MEDS BY RX/DR IN RCRD: CPT | Performed by: INTERNAL MEDICINE

## 2024-03-29 PROCEDURE — 99215 OFFICE O/P EST HI 40 MIN: CPT | Performed by: INTERNAL MEDICINE

## 2024-03-29 PROCEDURE — 1159F MED LIST DOCD IN RCRD: CPT | Performed by: INTERNAL MEDICINE

## 2024-03-29 PROCEDURE — 3008F BODY MASS INDEX DOCD: CPT | Performed by: INTERNAL MEDICINE

## 2024-03-29 PROCEDURE — 94729 DIFFUSING CAPACITY: CPT | Performed by: INTERNAL MEDICINE

## 2024-03-29 PROCEDURE — 94618 PULMONARY STRESS TESTING: CPT

## 2024-03-29 PROCEDURE — 3074F SYST BP LT 130 MM HG: CPT | Performed by: INTERNAL MEDICINE

## 2024-03-29 PROCEDURE — 94618 PULMONARY STRESS TESTING: CPT | Performed by: INTERNAL MEDICINE

## 2024-03-29 PROCEDURE — 3078F DIAST BP <80 MM HG: CPT | Performed by: INTERNAL MEDICINE

## 2024-03-29 PROCEDURE — 94010 BREATHING CAPACITY TEST: CPT | Performed by: INTERNAL MEDICINE

## 2024-03-29 PROCEDURE — 1157F ADVNC CARE PLAN IN RCRD: CPT | Performed by: INTERNAL MEDICINE

## 2024-03-29 SDOH — ECONOMIC STABILITY: FOOD INSECURITY: WITHIN THE PAST 12 MONTHS, YOU WORRIED THAT YOUR FOOD WOULD RUN OUT BEFORE YOU GOT MONEY TO BUY MORE.: NEVER TRUE

## 2024-03-29 SDOH — ECONOMIC STABILITY: FOOD INSECURITY: WITHIN THE PAST 12 MONTHS, THE FOOD YOU BOUGHT JUST DIDN'T LAST AND YOU DIDN'T HAVE MONEY TO GET MORE.: NEVER TRUE

## 2024-03-29 ASSESSMENT — PATIENT HEALTH QUESTIONNAIRE - PHQ9
1. LITTLE INTEREST OR PLEASURE IN DOING THINGS: NOT AT ALL
2. FEELING DOWN, DEPRESSED OR HOPELESS: NOT AT ALL
SUM OF ALL RESPONSES TO PHQ9 QUESTIONS 1 & 2: 0

## 2024-03-29 ASSESSMENT — LIFESTYLE VARIABLES
HOW OFTEN DO YOU HAVE A DRINK CONTAINING ALCOHOL: NEVER
SKIP TO QUESTIONS 9-10: 1
AUDIT-C TOTAL SCORE: 0
HOW OFTEN DO YOU HAVE SIX OR MORE DRINKS ON ONE OCCASION: NEVER
HOW MANY STANDARD DRINKS CONTAINING ALCOHOL DO YOU HAVE ON A TYPICAL DAY: PATIENT DOES NOT DRINK

## 2024-03-29 ASSESSMENT — PAIN SCALES - GENERAL: PAINLEVEL: 0-NO PAIN

## 2024-03-29 NOTE — TELEPHONE ENCOUNTER
Patient David CAMPBELL Mckayla[birth year] saw me in consult 3/19; I can do PCI RCA whenever he wants  I looked at his  cath  Needs contacted when order signed

## 2024-03-29 NOTE — PATIENT INSTRUCTIONS
David Block it was pleasure seeing you in clinic today. We discussed the following:    You will continue your Esbriet like we discussed  We will submit your paperwork for a new medication called Tyvaso that treat elevated pressures in the vessels of the lungs (pulmonary hypertension)      We will see you back in clinic in 1-2 months with repeat breathing and walking test      For scheduling purposes:    Call [phone] to schedule a breathing or a walking test     Call [phone] to schedule  EKG's, Echocardiograms and Cardiopulmonary Stress Tests.    Call [phone] to schedule Radiology tests such as Nuclear Medicine Stress Tests, CT Scans, and MRI's.    Should you have any questions Please Call our pulmonary nurse Claudette Campos at [phone]  or my assistant May Alonso at [phone]

## 2024-03-29 NOTE — PROGRESS NOTES
HPI:  Mr Block is a 73 y.o.  male with past medical history of significant smoking ( 40 packs years, quit in 2013), IPF, UIP pattern with progressive dyspnea and exertional hypoxia.     He was initially evaluated for IPF in August 2023:   08/04/2023: He reports that he has been diagnosed IPF 2 years back when he was started on OFEV but he stopped it June 2022 due to side effects including diarrhoea. Since last one year, his breathing is much worse with exertional dyspnea( mMRC 2-3. He has been prescribed oxygen since one month and uses 4L via NC on exertion. She reports productive cough with yellow sputum since last one year.     3/29/2024: since his last visit, he went through a lung transplant evaluation at [facility]. His evaluation was remarkable for PAH with mPA of 32 and normal wedge as well as significant CAD (Right dominant, 90% of RCA, and 60% of OM1). He was deemed high risk. He was referred for a second opinion at [facility]. He talked a provider over the phone and was told that he is high risk but was never offered an appointment in clinic. He feels his sx continue to progress. He is on 4L of supplemental O2 with exertion    PFTs:  3/29/2024  -> Ratio of 0.71/FEV1 2.52L (92%) (no BD response)/FVC 3.54L (99%)/DLCO 30%  08/2023:PFT with restrictive lung disease TLC 4.88(75%), DLCO 6.95(28%), 6 minute walk test : 224 m, SpO2 during test 77%     6MWT:  3/29/2024 ->on non-rebreathing (25L), 262 m. Peak SpO2 of 97%. Mariusz SpO2 91%.      Vitals:    03/29/24 1344   BP:    Pulse:    Temp:    SpO2: 91%      PHYSICAL EXAMINATION  Constitutional: Alert and oriented. In no acute distress. Well developed, well nourished.  Head and Face: Normal.   Oropharynx: normal.  Neck: No neck mass observed.  Pulmonary: Chest is normal to inspection. No increased work of breathing or signs of respiratory distress.   CV:  No obvious peripheral edema.  MSK: normal gait and station. No clubbing or cyanosis of the fingernails.  Skin: Normal skin  color and pigmentation, normal skin turgor, and no rash.  Neurologic:  EOMI. Moving all 4 extremities.  Psychiatric: Intact judgement and insight.     Medication Documentation Review Audit       Reviewed by Fritz Crouch DO (Physician) on 24 at 1159      Medication Order Taking? Sig Documenting Provider Last Dose Status   acetaminophen (Tylenol) 325 mg capsule [id number] Yes Take 1 capsule (325 mg) by mouth every 6 hours if needed. Historical Provider, MD Taking Active   aspirin 81 mg capsule [id number] Yes Take 1 capsule by mouth once daily. Historical Provider, MD Taking Active   atorvastatin (Lipitor) 80 mg tablet [id number]  Take 1 tablet (80 mg) by mouth once daily. Dolores Abdul, APRN-CNP   24 2359   b complex vitamins capsule [id number] Yes Take 1 capsule by mouth once daily. Historical Provider, MD Taking Active   bisoprolol (Zebeta) 5 mg tablet [id number]  Take 0.5 tablets (2.5 mg) by mouth once daily. Historical Provider, MD  Active   cholecalciferol (Vitamin D-3) 125 MCG (5000 UT) capsule [id number] Yes Take 1 capsule (125 mcg) by mouth once daily. Historical Provider, MD Taking Active   diphenhydrAMINE-acetaminophen (Tylenol PM)  mg per tablet [id number] Yes Take 1 tablet by mouth as needed at bedtime for sleep. Historical Provider, MD Taking Active   escitalopram (Lexapro) 5 mg tablet [id number] Yes Take 1 tablet (5 mg) by mouth once daily. Historical Provider, MD Taking Active   hydroCHLOROthiazide (HYDRODiuril) 25 mg tablet [id number] Yes Take 1 tablet (25 mg) by mouth once daily in the morning. Historical Provider, MD Taking Active   losartan (Cozaar) 25 mg tablet [id number] Yes Take 1 tablet (25 mg) by mouth once daily. Historical Provider, MD Taking Active   multivitamin/iron/folic acid (CENTRUM ORAL) [id number] Yes Take 1 tablet by mouth once daily. Historical Provider, MD Taking Active   oxygen (O2) gas therapy [id number] Yes Inhale. 4L WITH ACTIVITY; 2L WITH REST AS NEEDED;  DOES NOT USE O2 AT NIGHT Historical Provider, MD Taking Active   pirfenidone (Esbriet) 267 mg tablet tablet [id number] Yes Take 3 tablets (801 mg) by mouth 3 times a day. Historical Provider, MD Taking Active                  Past Medical History:   Diagnosis Date    Anxiety     BPH (benign prostatic hyperplasia)     Cancer of cheek (CMS/HCC)     Hyperlipidemia     Hypertension     Idiopathic pulmonary fibrosis (CMS/HCC)     IPF (idiopathic pulmonary fibrosis) (CMS/HCC)     lung transplant list, O2 4-5L    Skin cancer     Wears glasses     reading      Provider impression  73 y.o.  male with past medical history of significant smoking ( 40 packs years, quit in 2013), IPF, UIP pattern with progressive dyspnea and exertional hypoxia.     # Pulmonary fibrosis:  -No known occupational exposure or exposure to suspect medication.   -No risk factors for HP.   -Workup for rheumatologic disease including RF, anti-ccp, SARANYA with reflex BILLY, ANCA, ESR, CRP, CPK, aldolase, myositis ab panel done and negative.  -CT pattern with definite UIP but also component of emphysema  -Participating in MT.  -On Esbriet    # On Esbriet therapy:  Started September 2023  Tolerated well (No Nausea, abdominal pain, diarrhea, or weight loss)  Liver function monitoring: monthly LFT for 6 months and every 3 months after. Normal to date.     # Pulmonary hypertension:  -group 3 associated with his ILD  -RHC in 09/2023 with mPA of 32.  -would benefit from Tyvaso (ordered 03.2024)  -His Pulmonary hypertension is NOT related to his COPD/emphysema as evidenced by the extensive amount of fibrosis and his HRCT and minimal amount of emphysema.    # COPD with emphysema  Continue inhalers     # Chronic hypoxic respiratory failure:  -most likely related to all above  -needs 4L of supplemental O2 with exertion    RTC in 1-2 months

## 2024-04-02 ENCOUNTER — TELEPHONE (OUTPATIENT)
Dept: CARDIOLOGY | Facility: CLINIC | Age: 74
End: 2024-04-02
Payer: MEDICARE

## 2024-04-02 NOTE — TELEPHONE ENCOUNTER
Spoke with patient and he verbalized understanding of instructions for the cath, sending to scheduling for them to call the patient

## 2024-04-03 ENCOUNTER — TELEPHONE (OUTPATIENT)
Dept: CARDIOLOGY | Facility: CLINIC | Age: 74
End: 2024-04-03
Payer: MEDICARE

## 2024-04-03 ENCOUNTER — TELEPHONE (OUTPATIENT)
Dept: PULMONOLOGY | Facility: HOSPITAL | Age: 74
End: 2024-04-03
Payer: MEDICARE

## 2024-04-03 RX ORDER — CLOPIDOGREL BISULFATE 75 MG/1
TABLET ORAL
Qty: 30 TABLET | Refills: 11 | Status: SHIPPED | OUTPATIENT
Start: 2024-04-03

## 2024-04-03 NOTE — TELEPHONE ENCOUNTER
Spoke with pt regarding his heart catheterization. He stated that he is getting a heart cath with possible stent placement tomorrow, 4/4/24. He stated that Dr. Crouch is going to try and place a stent in the right coronary artery. He also is taking his loading dose of Brilinta tonight. Dr. Delaney notified. Informed pt that the order for Tyvaso was finalized and sent to pharmacy yesterday. He stated that a representative from [employer] contacted him last evening.

## 2024-04-03 NOTE — TELEPHONE ENCOUNTER
Patient is scheduled for PTCA tomorrow with Dr. Fritz Crouch, DO   Patient denies diabetes or allergy to dye.     Will inquire with Dr. Fritz Crouch, DO if he wants to start another medication in addition to asprin?

## 2024-04-03 NOTE — TELEPHONE ENCOUNTER
Lelo spoke with Dr. Fritz Crouch, DO  Patient will need Brilinta 180 mg today, then will go on Briilnta 90 mg BID. Will sent rx to [facility]. To [facility] to approve.   Patient is aware. Verbalized understanding.

## 2024-04-03 NOTE — TELEPHONE ENCOUNTER
Patient left a v/m message stating the cost of Brilinta is $400 and he can not  the rx.     Page to Dr. Fritz Crouch DO . Written order for Plavix 600 mg loading dose tonight

## 2024-04-04 LAB
Lab: 4.1 MMOL/L (ref 3.5–5.1)
NON-UH HIE ANION GAP: NORMAL (ref 6–15)
NON-UH HIE BASOPHILS # (AUTO): 0.1 10*3/UL (ref 0–0.2)
NON-UH HIE BASOPHILS % (AUTO): 0.6 %
NON-UH HIE BLOOD UREA NITROGEN: 20 MG/DL (ref 7–25)
NON-UH HIE CARBON DIOXIDE: 23.1 MMOL/L (ref 21–31)
NON-UH HIE CHLORIDE: 105 MMOL/L (ref 98–107)
NON-UH HIE CHOL/HDL RATIO: 4.7
NON-UH HIE CHOLESTEROL: 160 MG/DL (ref 140–200)
NON-UH HIE CREATININE CLR CALC PHARMACY: 68.51
NON-UH HIE CREATININE: 1.06 MG/DL (ref 0.7–1.3)
NON-UH HIE EOSINOPHILS # (AUTO): 0.1 10*3/UL (ref 0–0.45)
NON-UH HIE EOSINOPHILS % (AUTO): 0.8 %
NON-UH HIE ESTIMATED GFR: > 60
NON-UH HIE HDL CHOLESTEROL: 34 MG/DL (ref 23–92)
NON-UH HIE HEMATOCRIT: 60.3 % (ref 38.8–50)
NON-UH HIE HEMOGLOBIN: 20.4 G/DL (ref 13–17)
NON-UH HIE INR: 1.1
NON-UH HIE LDL CHOLESTEROL,CALCULATED: 49 MG/DL (ref 0–100)
NON-UH HIE LYMPHOCYTES # (AUTO): 2.2 10*3/UL (ref 1–4.8)
NON-UH HIE LYMPHOCYTES % (AUTO): 14.7 %
NON-UH HIE MEAN CORPUSCULAR HEMOGLOBIN: 31.2 PG (ref 27.5–35.2)
NON-UH HIE MEAN CORPUSCULAR HGB CONC: 33.8 G/DL (ref 32.5–35.6)
NON-UH HIE MEAN CORPUSCULAR VOLUME: 92.3 FL (ref 83.5–101)
NON-UH HIE MEAN PLATELET VOLUME: 8.2 FL (ref 6.6–10.1)
NON-UH HIE MONOCYTES # (AUTO): 1.5 10*3/UL (ref 0–0.8)
NON-UH HIE MONOCYTES % (AUTO): 9.7 %
NON-UH HIE NEUTROPHILS # (AUTO): 11.1 10*3/UL (ref 1.8–7.7)
NON-UH HIE NEUTROPHILS % (AUTO): 74.2 %
NON-UH HIE NRBC%: 0.4 /100{WBC} (ref 0–0.5)
NON-UH HIE PARTIAL THROMBOPLASTIN TIME: 29.2 S (ref 25.1–36.5)
NON-UH HIE PLATELET COUNT: 177 10*3/UL (ref 150–450)
NON-UH HIE PLATELET ESTIMATE: NORMAL
NON-UH HIE PLATELET MORPHOLOGY: NORMAL
NON-UH HIE POTASSIUM: NORMAL (ref 3.5–5.1)
NON-UH HIE PROTHROMBIN TIME: 13 S (ref 9–12.9)
NON-UH HIE RBC MORPHOLOGY: NORMAL
NON-UH HIE RED BLOOD COUNT: 6.54 (ref 3.9–5.6)
NON-UH HIE RED CELL DISTRIBUTION WIDTH: 14.2 % (ref 12–14.8)
NON-UH HIE SODIUM: 140 MMOL/L (ref 136–145)
NON-UH HIE TRIGLYCERIDE W/REFLEX: 383 MG/DL (ref 0–149)
NON-UH HIE UNCORRECTED WBC: 15 10*3/UL (ref 4.1–10.5)
NON-UH HIE VLDL CHOLESTEROL: 76 MG/DL
NON-UH HIE WHITE BLOOD COUNT: 15 10*3/UL (ref 4.1–10.5)

## 2024-04-09 ENCOUNTER — TELEPHONE (OUTPATIENT)
Dept: PULMONOLOGY | Facility: HOSPITAL | Age: 74
End: 2024-04-09
Payer: MEDICARE

## 2024-04-09 NOTE — TELEPHONE ENCOUNTER
Spoke with pt to follow up after his heart catheterization. He stated that he is feeling ok. They were able to place two stents. He mentioned that he goes back to the cardiologist on 5/21/24. Pt also mentioned that representative from Tyvas[facility] pt program reached out and spoke to him. Pt plans on starting Tyvaso within the next couple weeks.

## 2024-04-12 ENCOUNTER — TELEPHONE (OUTPATIENT)
Dept: PULMONOLOGY | Facility: HOSPITAL | Age: 74
End: 2024-04-12
Payer: MEDICARE

## 2024-04-12 DIAGNOSIS — I25.10 ASHD (ARTERIOSCLEROTIC HEART DISEASE): ICD-10-CM

## 2024-04-12 DIAGNOSIS — I10 HYPERTENSION, UNSPECIFIED TYPE: ICD-10-CM

## 2024-04-12 NOTE — TELEPHONE ENCOUNTER
Spoke with pt regarding his Tyvaso. He stated that Natalie from [employer] called him stating that [facility] was going to deny authorization of medication due to not having supporting documents. This nurse called Natalie from [employer]. She stated that [facility] is requesting supporting documents to approve pt receiving Tyvaso. This nurse called [facility] to see what documents are needed. Required documents were faxed to [facility] at [phone]. Fax confirmation was received. Pt was updated on this matter and appreciated the call.

## 2024-04-18 RX ORDER — METOPROLOL SUCCINATE 25 MG/1
25 TABLET, EXTENDED RELEASE ORAL DAILY
Qty: 90 TABLET | Refills: 3 | Status: SHIPPED | OUTPATIENT
Start: 2024-04-18 | End: 2025-04-18

## 2024-05-14 ENCOUNTER — TELEPHONE (OUTPATIENT)
Dept: PULMONOLOGY | Facility: HOSPITAL | Age: 74
End: 2024-05-14
Payer: MEDICARE

## 2024-05-14 NOTE — TELEPHONE ENCOUNTER
Spoke with Mr. Block and his daughter, Abbey, today. He is due to start Tyvaso tomorrow. He stated someone is coming to his house tomorrow for education and first dose of Tyvaso. His daughter wanted to know if he could get reevaluated for lung transplant now that he has stents placed. Pt and family would want to look into [facility] and [facility] again along with a referral to [facility]'s lung transplant program. Dr. Delaney notified.

## 2024-05-16 ENCOUNTER — TELEPHONE (OUTPATIENT)
Dept: PULMONOLOGY | Facility: HOSPITAL | Age: 74
End: 2024-05-16
Payer: MEDICARE

## 2024-05-16 NOTE — TELEPHONE ENCOUNTER
Pt called regarding the start of his Tyvaso. He stated that it went well and he feels pretty good today. He stated that he has a call out to Dr. Crouch's office to discuss his BP medications while taking Tyvaso. He also wanted to make Dr. Delaney aware. He was instructed to keep a log of his BP's and take it to his visit with Dr. Crouch next week. Pt verbalized understanding.

## 2024-05-20 ENCOUNTER — APPOINTMENT (OUTPATIENT)
Dept: CARDIOLOGY | Facility: CLINIC | Age: 74
End: 2024-05-20
Payer: MEDICARE

## 2024-05-21 ENCOUNTER — OFFICE VISIT (OUTPATIENT)
Dept: CARDIOLOGY | Facility: CLINIC | Age: 74
End: 2024-05-21
Payer: MEDICARE

## 2024-05-21 VITALS
HEIGHT: 67 IN | HEART RATE: 80 BPM | SYSTOLIC BLOOD PRESSURE: 104 MMHG | WEIGHT: 194.2 LBS | DIASTOLIC BLOOD PRESSURE: 60 MMHG | BODY MASS INDEX: 30.48 KG/M2

## 2024-05-21 DIAGNOSIS — J84.112 IPF (IDIOPATHIC PULMONARY FIBROSIS) (MULTI): ICD-10-CM

## 2024-05-21 DIAGNOSIS — Z98.61 HISTORY OF PTCA: ICD-10-CM

## 2024-05-21 DIAGNOSIS — I25.10 ASHD (ARTERIOSCLEROTIC HEART DISEASE): ICD-10-CM

## 2024-05-21 DIAGNOSIS — Z87.891 FORMER SMOKER: ICD-10-CM

## 2024-05-21 PROCEDURE — 1036F TOBACCO NON-USER: CPT | Performed by: INTERNAL MEDICINE

## 2024-05-21 PROCEDURE — 3008F BODY MASS INDEX DOCD: CPT | Performed by: INTERNAL MEDICINE

## 2024-05-21 PROCEDURE — 1159F MED LIST DOCD IN RCRD: CPT | Performed by: INTERNAL MEDICINE

## 2024-05-21 PROCEDURE — 3078F DIAST BP <80 MM HG: CPT | Performed by: INTERNAL MEDICINE

## 2024-05-21 PROCEDURE — 1157F ADVNC CARE PLAN IN RCRD: CPT | Performed by: INTERNAL MEDICINE

## 2024-05-21 PROCEDURE — 99214 OFFICE O/P EST MOD 30 MIN: CPT | Performed by: INTERNAL MEDICINE

## 2024-05-21 PROCEDURE — 3074F SYST BP LT 130 MM HG: CPT | Performed by: INTERNAL MEDICINE

## 2024-05-21 PROCEDURE — 1160F RVW MEDS BY RX/DR IN RCRD: CPT | Performed by: INTERNAL MEDICINE

## 2024-05-21 ASSESSMENT — ENCOUNTER SYMPTOMS
DIZZINESS: 1
SHORTNESS OF BREATH: 1

## 2024-05-21 NOTE — PATIENT INSTRUCTIONS
Please bring all medicines, vitamins, and herbal supplements with you when you come to the office.    Prescriptions will not be filled unless you are compliant with your follow up appointments or have a follow up appointment scheduled as per instruction of your physician. Refills should be requested at the time of your visit.     BMI was above normal measurement. Current weight: 88.1 kg (194 lb 3.2 oz)  Weight change since last visit (-) denotes wt loss 2.7 lbs   Weight loss needed to achieve BMI 25: 34.9 Lbs  Weight loss needed to achieve BMI 30: 3.1 Lbs  Provided instructions on dietary changes  Provided instructions on exercise    .

## 2024-05-22 ENCOUNTER — TELEPHONE (OUTPATIENT)
Dept: PULMONOLOGY | Facility: HOSPITAL | Age: 74
End: 2024-05-22
Payer: MEDICARE

## 2024-05-22 NOTE — TELEPHONE ENCOUNTER
[facility] reached out needing an updated plan of treatment for pt's Tyvaso. Treatment plan was signed by Dr. Delaney and faxed to [facility] at [phone]. Fax confirmation was received.

## 2024-05-24 ENCOUNTER — TELEPHONE (OUTPATIENT)
Dept: PULMONOLOGY | Facility: HOSPITAL | Age: 74
End: 2024-05-24
Payer: MEDICARE

## 2024-05-24 DIAGNOSIS — J84.9 ILD (INTERSTITIAL LUNG DISEASE) (MULTI): ICD-10-CM

## 2024-05-24 NOTE — TELEPHONE ENCOUNTER
[facility] nurse, Hany, faxed over nursing note from pt's visit on 5/22/24 for Tyvaso. Tried to call pt at [phone] regarding follow up, but was unsuccessful. Voicemail left with instructions to return the call at [phone].

## 2024-05-24 NOTE — TELEPHONE ENCOUNTER
Spoke with pt to follow up on his Tyvaso. Pt stated that he saw his cardiologist, Dr. Crouch, who stopped his losartan. Pt stated that he stopped the losartan on Wednesday. She mentioned that his lightheadedness and dizziness has gotten slightly better since stopping his losartan. He stated that he is down 10 lbs since his last visit with Dr. Delaney and his wife said that his complexion is better. He also stated that [facility] transplant called him and stated they are waiting for referral to be sent to them. Transplant referral sent to [facility] @ [phone] and [facility] at [phone]. Fax confirmations were received.

## 2024-05-30 ENCOUNTER — TELEPHONE (OUTPATIENT)
Dept: PULMONOLOGY | Facility: HOSPITAL | Age: 74
End: 2024-05-30
Payer: MEDICARE

## 2024-05-30 NOTE — TELEPHONE ENCOUNTER
Pt called regarding his breathing tests. Pt stated that he is getting the breathing tests done through [facility] on 6/20/24 as apart of his lung transplant evaluation. PFT's were cancelled at  and will obtain the testing from [facility] for pt's 7/3/24 visit with Dr. Delaney. Pt stated that he is starting to feel a difference since starting Tyvaso. He mentioned that his recovery time is shorter and he can walk a little farther before getting sob.

## 2024-05-30 NOTE — ADDENDUM NOTE
Encounter addended by: Chanell Broderick RN on: 5/30/2024 2:22 PM   Actions taken: Imaging Exam ended, Charge Capture section accepted

## 2024-06-07 ENCOUNTER — APPOINTMENT (OUTPATIENT)
Dept: RESPIRATORY THERAPY | Facility: HOSPITAL | Age: 74
End: 2024-06-07
Payer: MEDICARE

## 2024-06-07 ENCOUNTER — APPOINTMENT (OUTPATIENT)
Dept: PULMONOLOGY | Facility: HOSPITAL | Age: 74
End: 2024-06-07
Payer: MEDICARE

## 2024-06-21 ENCOUNTER — TELEPHONE (OUTPATIENT)
Dept: CARDIOLOGY | Facility: CLINIC | Age: 74
End: 2024-06-21
Payer: MEDICARE

## 2024-06-21 DIAGNOSIS — J18.1 LUNG CONSOLIDATION (CMS-HCC): ICD-10-CM

## 2024-06-21 DIAGNOSIS — R93.89 ABNORMAL CT OF THE CHEST: ICD-10-CM

## 2024-06-21 NOTE — TELEPHONE ENCOUNTER
Patient states he needs a lung transplant. Patient has a visit in Aug to discuss. Patient states he will need to be off plavix before he can be placed on the transplant list S/p ptca 4/4/2024. To Dr. Fritz Crouch DO for review    Dr. Bergeron  [phone]    Patient was in pulmonary rehab before stents. [facility]

## 2024-06-26 NOTE — TELEPHONE ENCOUNTER
----- Message from Fritz Crouch DO sent at 6/26/2024  2:12 PM EDT -----  Patient may discontinue clopidogrel October 8, 2024 and be listed for pulmonary transplant.  Please notify Dr. Davion Delaney office formally.    Patient may be referred to [facility] as requested

## 2024-06-26 NOTE — TELEPHONE ENCOUNTER
Spoke with patient. Patient is concerned about missing the dead line for the transplant list. Patient has an apt pending 7/3/2024. Advised patient to discuss it a his pending visit and have facility contact with concerns.     Call placed to Dr. Delaney's office ( [phone]). Office is currently closed. Will attempt later.   Order for rehab to Dr. Fritz Crouch, DO to sign. Task sent to  to call and arrange once order signed.       [phone] office # for Elaina.

## 2024-06-27 NOTE — TELEPHONE ENCOUNTER
Spoke with patient states he might be added to the transplant list at the end of August. Verbalized understanding will discontinue Plavix when added to list. Referral for  pulmonary Rehab in chart to be scheduled with [facility].

## 2024-06-28 NOTE — TELEPHONE ENCOUNTER
Called Pulm Rehab at [facility] ([phone] ext [phone]) to see what all they needed. I faxed over the order, patient demographics, and last ov to [phone].

## 2024-07-03 ENCOUNTER — OFFICE VISIT (OUTPATIENT)
Dept: PULMONOLOGY | Facility: HOSPITAL | Age: 74
End: 2024-07-03
Payer: MEDICARE

## 2024-07-03 VITALS
TEMPERATURE: 98.1 F | WEIGHT: 191 LBS | OXYGEN SATURATION: 93 % | SYSTOLIC BLOOD PRESSURE: 103 MMHG | BODY MASS INDEX: 29.91 KG/M2 | HEART RATE: 75 BPM | DIASTOLIC BLOOD PRESSURE: 67 MMHG

## 2024-07-03 DIAGNOSIS — R91.1 LUNG NODULE: Primary | ICD-10-CM

## 2024-07-03 DIAGNOSIS — I27.20 PULMONARY HYPERTENSION (MULTI): ICD-10-CM

## 2024-07-03 DIAGNOSIS — J96.11 CHRONIC RESPIRATORY FAILURE WITH HYPOXIA (MULTI): ICD-10-CM

## 2024-07-03 PROCEDURE — 3008F BODY MASS INDEX DOCD: CPT | Performed by: INTERNAL MEDICINE

## 2024-07-03 PROCEDURE — 3078F DIAST BP <80 MM HG: CPT | Performed by: INTERNAL MEDICINE

## 2024-07-03 PROCEDURE — 99215 OFFICE O/P EST HI 40 MIN: CPT | Performed by: INTERNAL MEDICINE

## 2024-07-03 PROCEDURE — 1126F AMNT PAIN NOTED NONE PRSNT: CPT | Performed by: INTERNAL MEDICINE

## 2024-07-03 PROCEDURE — 1157F ADVNC CARE PLAN IN RCRD: CPT | Performed by: INTERNAL MEDICINE

## 2024-07-03 PROCEDURE — 1036F TOBACCO NON-USER: CPT | Performed by: INTERNAL MEDICINE

## 2024-07-03 PROCEDURE — 1159F MED LIST DOCD IN RCRD: CPT | Performed by: INTERNAL MEDICINE

## 2024-07-03 PROCEDURE — 3074F SYST BP LT 130 MM HG: CPT | Performed by: INTERNAL MEDICINE

## 2024-07-03 ASSESSMENT — LIFESTYLE VARIABLES: HOW OFTEN DO YOU HAVE A DRINK CONTAINING ALCOHOL: NEVER

## 2024-07-03 ASSESSMENT — PAIN SCALES - GENERAL: PAINLEVEL: 0-NO PAIN

## 2024-07-03 NOTE — PATIENT INSTRUCTIONS
David Block it was pleasure seeing you in clinic today. We discussed the following:    You will continue your Esbriet like we discussed  You will continue your Tyvaso like we discussed  You will continue your transplant evaluation at [facility]    We will see you back in clinic in 4-5 months       For scheduling purposes:    Call [phone] to schedule a breathing or a walking test     Call [phone] to schedule  EKG's, Echocardiograms and Cardiopulmonary Stress Tests.    Call [phone] to schedule Radiology tests such as Nuclear Medicine Stress Tests, CT Scans, and MRI's.    Should you have any questions Please Call our pulmonary nurse Claudette Campos at [phone]  or my assistant May Alonso at [phone]

## 2024-07-03 NOTE — PROGRESS NOTES
HPI:  Mr Block is a 73 y.o.  male with past medical history of significant smoking ( 40 packs years, quit in 2013), IPF, UIP pattern with progressive dyspnea and exertional hypoxia.     He was initially evaluated for IPF in August 2023:   08/04/2023: He reports that he has been diagnosed IPF 2 years back when he was started on OFEV but he stopped it June 2022 due to side effects including diarrhoea. Since last one year, his breathing is much worse with exertional dyspnea( mMRC 2-3. He has been prescribed oxygen since one month and uses 4L via NC on exertion. She reports productive cough with yellow sputum since last one year.     3/29/2024: since his last visit, he went through a lung transplant evaluation at [facility]. His evaluation was remarkable for PAH with mPA of 32 and normal wedge as well as significant CAD (Right dominant, 90% of RCA, and 60% of OM1). He was deemed high risk. He was referred for a second opinion at [facility]. He talked a provider over the phone and was told that he is high risk but was never offered an appointment in clinic. He feels his sx continue to progress. He is on 4L of supplemental O2 with exertion    7/3/2024: Since the last visit, patient's breathing is mostly unchanged. No hospital admissions or ED visits. No new chest pain, cough, sputum, fever, chills or night sweats. Started Tyvaso around mid May 2024, titrating up to 8 breaths 4x/day and experiencing worsened cough but wishes to continue to increase the dose. He had stents with Dr. Crouch in [facility]. He went back to [facility] for a relook but his walk test was still under 650 feet. He restarted pulmonary rehab again on 07/01.    PFTs:  3/29/2024  -> Ratio of 0.71/FEV1 2.52L (92%) (no BD response)/FVC 3.54L (99%)/DLCO 30%  08/2023:PFT with restrictive lung disease TLC 4.88(75%), DLCO 6.95(28%), 6 minute walk test : 224 m, SpO2 during test 77%     6MWT:  3/29/2024 ->on non-rebreathing (25L), 262 m. Peak SpO2 of 97%. Mariusz SpO2 91%.       Vitals:    24 1316   BP:    Pulse:    Temp:    SpO2: 93%      PHYSICAL EXAMINATION  Constitutional: Alert and oriented. In no acute distress. Well developed, well nourished.  Head and Face: Normal.   Oropharynx: normal.  Neck: No neck mass observed.  Pulmonary: Chest is normal to inspection. No increased work of breathing or signs of respiratory distress.   CV:  No obvious peripheral edema.  MSK: normal gait and station. No clubbing or cyanosis of the fingernails.  Skin: Normal skin color and pigmentation, normal skin turgor, and no rash.  Neurologic:  EOMI. Moving all 4 extremities.  Psychiatric: Intact judgement and insight.     Medication Documentation Review Audit       Reviewed by Karolina Ferguson MA (Medical Assistant) on 24 at 1314      Medication Order Taking? Sig Documenting Provider Last Dose Status   acetaminophen (Tylenol) 325 mg capsule [id number] Yes Take 1 capsule (325 mg) by mouth every 6 hours if needed. Historical Provider, MD Taking Active   aspirin 81 mg capsule [id number] Yes Take 1 capsule by mouth once daily. Historical Provider, MD Taking Active   atorvastatin (Lipitor) 80 mg tablet [id number]  Take 1 tablet (80 mg) by mouth once daily. ELIOT Brian-CNP   24 2359   b complex vitamins capsule [id number] Yes Take 1 capsule by mouth once daily. Historical Provider, MD Taking Active   cholecalciferol (Vitamin D-3) 125 MCG (5000 UT) capsule [id number] Yes Take 1 capsule (125 mcg) by mouth once daily. Historical Provider, MD Taking Active   clopidogrel (Plavix) 75 mg tablet [id number] Yes Loading dose of 600mg ( 8 tablets) x one time only, then one tablet daily Rafaela Abdul APRN-CNP Taking Active   diphenhydrAMINE-acetaminophen (Tylenol PM)  mg per tablet [id number] Yes Take 1 tablet by mouth as needed at bedtime for sleep. Historical Provider, MD Taking Active   escitalopram (Lexapro) 5 mg tablet [id number] Yes Take 1 tablet (5 mg) by mouth once daily. Historical  Provider, MD Taking Active   hydroCHLOROthiazide (HYDRODiuril) 25 mg tablet [id number] Yes Take 0.5 tablets (12.5 mg) by mouth once daily in the morning. Historical Provider, MD Taking Differently Active   losartan (Cozaar) 25 mg tablet [id number]  Take 1 tablet (25 mg) by mouth once daily. Historical Provider, MD  Active   metoprolol succinate XL (Toprol-XL) 25 mg 24 hr tablet [id number] Yes Take 1 tablet (25 mg) by mouth once daily. Fritz Crouch,  Taking Active   multivitamin/iron/folic acid (CENTRUM ORAL) [id number] Yes Take 1 tablet by mouth once daily. Historical Provider, MD Taking Active   nitroglycerin (Nitrostat) 0.4 mg SL tablet [id number] Yes Place 1 tablet (0.4 mg) under the tongue every 5 minutes if needed for chest pain. Fritz Crouch,  Taking Active   oxygen (O2) gas therapy [id number] Yes Inhale. 4L WITH ACTIVITY; 2L WITH REST AS NEEDED; DOES NOT USE O2 AT NIGHT Historical Provider, MD Taking Active   pirfenidone (Esbriet) 267 mg tablet tablet [id number] Yes Take 3 tablets (801 mg) by mouth 3 times a day. Historical Provider, MD Taking Active   Tyvaso Refill Kit 1.74 mg/2.9 mL (0.6 mg/mL) solution for nebulization [id number] Yes 9 breaths (54 mcg) to 12 breaths (72 mcg), 4 times a day - Start with 3 breaths (18 mcg) 4 times a day (if 3 breaths are not tolerated, use 1 to 2 breaths). Increase by additional 1 breath per week, if tolerated, until the target dose of 9 breaths (54 mcg) to 12 breaths (72 mcg), 4 times a day.  Taking Active                  Past Medical History:   Diagnosis Date    Anxiety     BPH (benign prostatic hyperplasia)     Cancer of cheek (Multi)     Hyperlipidemia     Hypertension     Idiopathic pulmonary fibrosis (Multi)     IPF (idiopathic pulmonary fibrosis) (Multi)     lung transplant list, O2 4-5L    Skin cancer     Wears glasses     reading      Provider impression  73 y.o.  male with past medical history of significant smoking ( 40 packs years, quit in 2013), IPF, UIP  pattern with progressive dyspnea and exertional hypoxia.     # Pulmonary fibrosis:  -No known occupational exposure or exposure to suspect medication.   -No risk factors for HP.   -Workup for rheumatologic disease including RF, anti-ccp, SARANYA with reflex BILLY, ANCA, ESR, CRP, CPK, aldolase, myositis ab panel done and negative.  -CT pattern with definite UIP but also component of emphysema  -Participating in DC.  -On Esbriet    # On Esbriet therapy:  Started September 2023  Tolerated well (No Nausea, abdominal pain, diarrhea, or weight loss)  Liver function monitoring: monthly LFT for 6 months and every 3 months after. Normal to date.     # Lung transplant evaluation:  -he was evaluated for Ltx at [facility] in 2024 and was deemed not a candidate to age and co-morbidities (mainly CAD).  -Initially got a second opinion at [facility] and was also not deemed a good candidate due to need for CABG in setting of advanced age and deconditioned state. However, after his PCI, he is currently being re-evaluated    # Pulmonary hypertension:  -group 3 associated with his ILD  -RHC in 09/2023 with mPA of 32.  -would benefit from Tyvaso (ordered 03.2024)  Started Tyvaso around mid May 2024, titrating up to 8 breaths 4x/day and experiencing worsened cough but wishes to continue to increase the dose.    # COPD with emphysema  Continue inhalers     # Chronic hypoxic respiratory failure:  -most likely related to all above  -needs 4L of supplemental O2 with exertion    RTC in  4-5 months

## 2024-07-11 NOTE — TELEPHONE ENCOUNTER
Patient contacted. Patient states he had started cardiac rehab at [facility]. Patient states he is currently on plavix. Next visit at the clinic is 8/19, 20, and 21. States he will then address the transplant list and keep office posted.

## 2024-08-14 ENCOUNTER — TELEPHONE (OUTPATIENT)
Dept: CARDIOLOGY | Facility: CLINIC | Age: 74
End: 2024-08-14
Payer: MEDICARE

## 2024-08-14 DIAGNOSIS — Z95.1 S/P CABG (CORONARY ARTERY BYPASS GRAFT): ICD-10-CM

## 2024-08-14 DIAGNOSIS — Z98.61 HISTORY OF PTCA: ICD-10-CM

## 2024-08-14 NOTE — TELEPHONE ENCOUNTER
Nurse from Cardiac Rehab in [place] called said the wrong Referral got sent over and they need a new one Stating he started on 07/02/2024     Paper order on Dr. Fritz Crouch, DO to be signed and faxed

## 2024-08-20 ENCOUNTER — TELEPHONE (OUTPATIENT)
Dept: SLEEP MEDICINE | Facility: HOSPITAL | Age: 74
End: 2024-08-20
Payer: MEDICARE

## 2024-08-20 DIAGNOSIS — J84.112 IPF (IDIOPATHIC PULMONARY FIBROSIS) (MULTI): Primary | ICD-10-CM

## 2024-08-20 RX ORDER — PIRFENIDONE 267 MG/1
801 TABLET, FILM COATED ORAL 3 TIMES DAILY
Qty: 90 TABLET | Refills: 11 | Status: SHIPPED | OUTPATIENT
Start: 2024-08-20

## 2024-08-21 DIAGNOSIS — J84.112 IPF (IDIOPATHIC PULMONARY FIBROSIS) (MULTI): ICD-10-CM

## 2024-08-21 RX ORDER — PIRFENIDONE 267 MG/1
801 TABLET, FILM COATED ORAL 3 TIMES DAILY
Qty: 90 TABLET | Refills: 11 | Status: CANCELLED | OUTPATIENT
Start: 2024-08-21

## 2024-08-27 NOTE — TELEPHONE ENCOUNTER
Pt called with concerns regarding his Tyvaso. He stated that he received a letter from Medicare explaining that the medication and all supplies need to be thrown away at the end of each month. New medication and supplies will then get sent costing him $43,000. This nurse reached out to Bonnie Hook, Reimbursment Manager for Tyvaso. A voicemail was left for her to return the call at [phone]. Pt was informed that he will remain updated about this issue.

## 2024-08-27 NOTE — TELEPHONE ENCOUNTER
Spoke with Bonnie Hook regarding pt's Tyvaso. Bonnie stated that it looks like the pt is still enrolled in the Patient Assistance Program. She stated that she will look further into this to confirm his enrollment. Pt was updated on this matter and verbalized understanding. Pt is emailing the letter he received to the office for better understanding of the contents.

## 2024-09-06 ENCOUNTER — TELEPHONE (OUTPATIENT)
Dept: CARDIOLOGY | Facility: CLINIC | Age: 74
End: 2024-09-06
Payer: MEDICARE

## 2024-09-06 NOTE — TELEPHONE ENCOUNTER
Patient phones stating he got a call from [facility] that he will be able to have a double lung transplant soon. Patient inquiring if he can stop his plavix tomorrow 9/7/24 as the Clinic wants him to have a 5 day cleansing period before being scheduled. Upon review of patient's chart, previous notes from Dr. Fritz Crouch, DO and discussion with Zulma Vanegas RN, patient able to stop plavix prematurely prior to 10/8/24. Informed patient of okay to stop medication, he verbalized understanding.

## 2024-09-10 ENCOUNTER — APPOINTMENT (OUTPATIENT)
Dept: HEMATOLOGY/ONCOLOGY | Facility: HOSPITAL | Age: 74
End: 2024-09-10
Payer: MEDICARE

## 2024-09-16 ENCOUNTER — APPOINTMENT (OUTPATIENT)
Dept: CARDIOLOGY | Facility: CLINIC | Age: 74
End: 2024-09-16
Payer: MEDICARE

## 2024-10-01 ENCOUNTER — TELEPHONE (OUTPATIENT)
Dept: PULMONOLOGY | Facility: HOSPITAL | Age: 74
End: 2024-10-01
Payer: MEDICARE

## 2024-10-01 DIAGNOSIS — R11.0 NAUSEA: ICD-10-CM

## 2024-10-01 RX ORDER — ONDANSETRON 4 MG/1
4 TABLET, FILM COATED ORAL EVERY 8 HOURS PRN
Qty: 90 TABLET | Refills: 1 | Status: SHIPPED | OUTPATIENT
Start: 2024-10-01

## 2024-10-01 NOTE — TELEPHONE ENCOUNTER
Spoke with pt and updated him on plan of care. Per Dr. Delaney, will prescribe Zofran for the nausea and vomiting with Tyvaso. Pt verbalized understanding and verified pharmacy.

## 2024-10-01 NOTE — TELEPHONE ENCOUNTER
Pt reached out and left a voicemail regarding possible side effect of Tyvaso. Tried to return pt's call at [phone] regarding this matter, but was unsuccessful.  A voicemail was left instructing pt to return the call at [phone].

## 2024-10-14 ENCOUNTER — HOSPITAL ENCOUNTER (OUTPATIENT)
Dept: HOSPITAL 101 - CR | Age: 74
LOS: 43 days | Discharge: HOME | End: 2024-11-26
Payer: MEDICARE

## 2024-10-14 DIAGNOSIS — J18.1: ICD-10-CM

## 2024-10-14 DIAGNOSIS — R93.89: ICD-10-CM

## 2024-10-14 DIAGNOSIS — Z95.5: Primary | ICD-10-CM

## 2024-10-14 DIAGNOSIS — Z95.1: ICD-10-CM

## 2024-10-14 PROCEDURE — 93798 PHYS/QHP OP CAR RHAB W/ECG: CPT

## 2024-10-15 NOTE — PROGRESS NOTES
"Subjective   David Block is a 73 y.o. male       Chief Complaint    Follow-up          73-year-old gentleman returns following elective PCI proximal through mid RCA with 2 drug-eluting stents this past April.  He is doing well and improved from a anginal equivalent/shortness of breath standpoint.    He has underlying severe IPF, was recently started on Tyvaso nebulizer for his IPF now blood pressure is running 104/60 and as low as 100/40.  He is moderate symptomatic hypotension.  He has no angina or nitrate usage.  He is undergoing repeat assessment for possible pulmonary transplantation.  He remains on DAPT.  Today's blood pressure is as noted    Recommendations, discontinue losartan at this time, follow blood pressure closely, follow-up with nurse practitioner in 16 weeks, I will see him again within the next 6 to 12 months, continue with 1 years worth of DAPT    Addendum: 6/26/2024, updated information in regards to Mr. Block's case in regards to transplant candidacy.  Appears [facility] will list him once he is off his clopidogrel.  Status postelective PCI of the RCA with 2 drug-eluting stents in April 2024 (not an ACS event) with preserved LV function.  I did discuss case with Dr. Delaney's nurse at [facility] via electronic transmission; he can stop his clopidogrel on the 6-month anniversary; 10/8/2024.  I did mention that if this is urgent/emergent he can stop his clopidogrel anytime 6 to 12 weeks after his original elective stenting procedure.           Review of Systems   Respiratory:  Positive for shortness of breath.    Neurological:  Positive for dizziness.   All other systems reviewed and are negative.           Vitals:    05/21/24 1530   BP: 104/60   BP Location: Left arm   Patient Position: Sitting   Pulse: 80   Weight: 88.1 kg (194 lb 3.2 oz)   Height: 1.702 m (5' 7\")        Objective   Physical Exam  Constitutional:       Appearance: Normal appearance.   HENT:      Nose: Nose normal. " PCP notified      Neck:      Vascular: No carotid bruit.   Cardiovascular:      Rate and Rhythm: Normal rate.      Pulses: Normal pulses.      Heart sounds: Normal heart sounds.   Pulmonary:      Effort: Pulmonary effort is normal.   Abdominal:      General: Bowel sounds are normal.      Palpations: Abdomen is soft.   Musculoskeletal:         General: Normal range of motion.      Cervical back: Normal range of motion.      Right lower leg: No edema.      Left lower leg: No edema.   Skin:     General: Skin is warm and dry.   Neurological:      General: No focal deficit present.      Mental Status: He is alert.   Psychiatric:         Mood and Affect: Mood normal.         Behavior: Behavior normal.         Thought Content: Thought content normal.         Judgment: Judgment normal.         Allergies  Nifedipine     Current Medications    Current Outpatient Medications:     acetaminophen (Tylenol) 325 mg capsule, Take 1 capsule (325 mg) by mouth every 6 hours if needed., Disp: , Rfl:     aspirin 81 mg capsule, Take 1 capsule by mouth once daily., Disp: , Rfl:     atorvastatin (Lipitor) 80 mg tablet, Take 1 tablet (80 mg) by mouth once daily., Disp: 30 tablet, Rfl: 0    b complex vitamins capsule, Take 1 capsule by mouth once daily., Disp: , Rfl:     cholecalciferol (Vitamin D-3) 125 MCG (5000 UT) capsule, Take 1 capsule (125 mcg) by mouth once daily., Disp: , Rfl:     clopidogrel (Plavix) 75 mg tablet, Loading dose of 600mg ( 8 tablets) x one time only, then one tablet daily, Disp: 30 tablet, Rfl: 11    diphenhydrAMINE-acetaminophen (Tylenol PM)  mg per tablet, Take 1 tablet by mouth as needed at bedtime for sleep., Disp: , Rfl:     escitalopram (Lexapro) 5 mg tablet, Take 1 tablet (5 mg) by mouth once daily., Disp: , Rfl:     hydroCHLOROthiazide (HYDRODiuril) 25 mg tablet, Take 1 tablet (25 mg) by mouth once daily in the morning., Disp: , Rfl:     losartan (Cozaar) 25 mg tablet, Take 1 tablet (25 mg) by mouth once daily., Disp: ,  Rfl:     metoprolol succinate XL (Toprol-XL) 25 mg 24 hr tablet, Take 1 tablet (25 mg) by mouth once daily., Disp: 90 tablet, Rfl: 3    multivitamin/iron/folic acid (CENTRUM ORAL), Take 1 tablet by mouth once daily., Disp: , Rfl:     nitroglycerin (Nitrostat) 0.4 mg SL tablet, Place 1 tablet (0.4 mg) under the tongue every 5 minutes if needed for chest pain., Disp: 90 tablet, Rfl: 12    oxygen (O2) gas therapy, Inhale. 4L WITH ACTIVITY; 2L WITH REST AS NEEDED; DOES NOT USE O2 AT NIGHT, Disp: , Rfl:     pirfenidone (Esbriet) 267 mg tablet tablet, Take 3 tablets (801 mg) by mouth 3 times a day., Disp: , Rfl:     Tyvaso Refill Kit 1.74 mg/2.9 mL (0.6 mg/mL) solution for nebulization, 9 breaths (54 mcg) to 12 breaths (72 mcg), 4 times a day - Start with 3 breaths (18 mcg) 4 times a day (if 3 breaths are not tolerated, use 1 to 2 breaths). Increase by additional 1 breath per week, if tolerated, until the target dose of 9 breaths (54 mcg) to 12 breaths (72 mcg), 4 times a day., Disp: 81.2 mL, Rfl: 11                     Assessment/Plan   1. ASHD (arteriosclerotic heart disease)  Follow Up In Cardiology      2. IPF (idiopathic pulmonary fibrosis) (Multi)        3. Former smoker        4. BMI 30.0-30.9,adult                 Scribe Attestation  By signing my name below, IBarbara LPN, Scribe   attest that this documentation has been prepared under the direction and in the presence of Fritz Crouch DO.     Provider Attestation - Scribe documentation    All medical record entries made by the Scribe were at my direction and personally dictated by me. I have reviewed the chart and agree that the record accurately reflects my personal performance of the history, physical exam, discussion and plan.

## 2024-12-17 ENCOUNTER — TELEPHONE (OUTPATIENT)
Dept: PULMONOLOGY | Facility: HOSPITAL | Age: 74
End: 2024-12-17
Payer: MEDICARE

## 2024-12-17 NOTE — TELEPHONE ENCOUNTER
Returned pt's call regarding his tyvaso. He requested to call Tyvaso to place a hold on the medication since he has received a lung transplant at [facility]. This nurse will call and place medication on hold.

## 2024-12-27 ENCOUNTER — APPOINTMENT (OUTPATIENT)
Dept: VASCULAR SURGERY | Facility: HOSPITAL | Age: 74
End: 2024-12-27
Payer: MEDICARE

## 2025-01-24 ENCOUNTER — APPOINTMENT (OUTPATIENT)
Dept: PULMONOLOGY | Facility: HOSPITAL | Age: 75
End: 2025-01-24
Payer: MEDICARE

## 2025-07-03 ENCOUNTER — TELEPHONE (OUTPATIENT)
Dept: CARDIOLOGY | Facility: CLINIC | Age: 75
End: 2025-07-03
Payer: MEDICARE

## 2025-07-03 NOTE — TELEPHONE ENCOUNTER
Called number listed, Dr. Madden is no longer at that office,   Phone [phone]  Attempted to reach the office to see if patient can be cleared by Rafaela Castillo NP as there are no openings for Dr. Fritz Crouch, DO currently. On hold and never an option to leave voicemail.

## 2025-07-03 NOTE — TELEPHONE ENCOUNTER
Call from patient stating he needs POC for back surgery scheduled 07.31.25 with Dr. Madden/[facility]; having lypoma removed.  LOV 05.21.24.  Patient aware he needs to schedule prior to surgery.     Dr. Madden  P [phone]  F [phone]    To SO clerical to schedule POC visit.

## 2025-07-09 ENCOUNTER — APPOINTMENT (OUTPATIENT)
Dept: CARDIOLOGY | Facility: CLINIC | Age: 75
End: 2025-07-09
Payer: MEDICARE

## 2025-07-09 ENCOUNTER — TELEPHONE (OUTPATIENT)
Dept: CARDIOLOGY | Facility: CLINIC | Age: 75
End: 2025-07-09

## 2025-07-09 NOTE — TELEPHONE ENCOUNTER
Patient's daughter left voicemail advising patient needed to cancel 8am appointment with Rafaela Castillo NP this morning. Patient went to [facility] for Afib and patient was waiting to be transferred to [facility]. Daughter asked that we get a message to Dr. Fritz Crouch,  to advise.

## (undated) DEVICE — SPONGE, HEMOSTATIC, CELLULOSE, SURGICEL, NU-KNIT, 6 X 9 IN

## (undated) DEVICE — SPONGE, KERLIX 6, SUPER, MED, 6 X 6 3/4, STERILE

## (undated) DEVICE — SUTURE, VICRYL, 3-0, 27 IN, SH

## (undated) DEVICE — FLOSEAL, MATRIX, HEMOSTATIC, FULL STERILE PREP, 5ML

## (undated) DEVICE — GOWN, SURGICAL, ROYAL SILK, XXL, STERILE

## (undated) DEVICE — LABEL KIT, MEDICATION, OR EMC, STERILE

## (undated) DEVICE — DRESSING, GAUZE, SPONGE, VERSALON, 4 PLY, 4 X 4 IN, BULK, NS

## (undated) DEVICE — CONTAINER, SPECIMEN, 120 ML, STERILE

## (undated) DEVICE — ELECTRODE, CORKSCREW NEEDLE 1.5M LENGTH

## (undated) DEVICE — HEMOSTAT, SURGICEL POWDER 3.0GRAMS

## (undated) DEVICE — WAX, BONE, 2.5 GM

## (undated) DEVICE — DRAPE, TOWEL, STERI DRAPE, 17 X 11 IN, PLASTIC, STERILE

## (undated) DEVICE — Device

## (undated) DEVICE — CATHETER TRAY, SURESTEP, 16FR, URINE METER W/STATLOCK

## (undated) DEVICE — PROTECTOR, NERVE, ULNAR, PINK

## (undated) DEVICE — NEEDLE, SPINAL, LUMBAR PUNCTURE, NEONATAL, 22 G X 2.5 IN, BLACK HUB

## (undated) DEVICE — NEEDLE, SAFETY, 25 GA X 1.5 IN

## (undated) DEVICE — TOWEL, OR, XRAY DETECT 5 PK, WHITE, 17X26, W/DMT TAG, ST

## (undated) DEVICE — DRESSING, NON-ADHERENT, TELFA, OUCHLESS, 3 X 8 IN, STERILE

## (undated) DEVICE — CONTAINER, SPECIMEN, 120ML

## (undated) DEVICE — TAPE, UMBILICAL, 1/8 X 30 IN, MULTIPACK, COTTON, WHITE

## (undated) DEVICE — ELECTRODE, GROUND PLATE

## (undated) DEVICE — CORD, BIPOLAR,  12 FT, DISPOSABLE, LF

## (undated) DEVICE — COVER, TABLE, UHC

## (undated) DEVICE — MANIFOLD, 4 PORT NEPTUNE STANDARD

## (undated) DEVICE — COVER, CART, 45 X 27 X 48 IN, CLEAR

## (undated) DEVICE — APPLICATOR, CHLORAPREP, W/ORANGE TINT, 26ML

## (undated) DEVICE — NEEDLE, ELECTRODE, SUBDERMAL, PAIRED, 2.0 LEAD, DISP

## (undated) DEVICE — TRAY, MINOR, SINGLE BASIN, STERILE

## (undated) DEVICE — DRESSING, ADHESIVE, ISLAND, TELFA, 2 X 3.75 IN, LF

## (undated) DEVICE — NEEDLE, SPINAL, LONG, 22 G X 5 IN, BLACK HUB

## (undated) DEVICE — SUTURE, MONOCRYL, 4-0, 18 IN, PS2, UNDYED

## (undated) DEVICE — BRIEF, INCONTINENCE, SEAMLESS, SZ 2X/ 3X, PURPLE/ GRAY

## (undated) DEVICE — COVER, BACK TABLE

## (undated) DEVICE — COVER, TABLE, 44 X 75 IN, DISPOSABLE, LF, STERILE

## (undated) DEVICE — INSERT, CLAMP, SURGICAL, SOFT/TRACTION, STEALTH, 1 MM

## (undated) DEVICE — SUTURE, PROLENE, 7-0, 30 IN, BV1, DA, BLUE

## (undated) DEVICE — SYRINGE, 10 CC, LUER LOCK

## (undated) DEVICE — ADHESIVE, SKIN, LIQUIBAND EXCEED

## (undated) DEVICE — KIT, TOURNIQUET, 7"

## (undated) DEVICE — DRAPE, PAD, INSTRUMENT, MAGNETIC, MEDIUM, 10 X 16 IN, DISPOSABLE

## (undated) DEVICE — BOWL, BASIN, 32 OZ, STERILE

## (undated) DEVICE — DRESSING, ISLAND, ADHESIVE, TELFA, 4 X 8 IN

## (undated) DEVICE — SUTURE, PROLENE, 5-0, 36 IN, C-1, CV-11, BLUE

## (undated) DEVICE — BLADE, OPHTHALMIC, MINI, STRAIGHT, DOUBLE BEVEL, SHARP ALL AROUND

## (undated) DEVICE — NEEDLE, CORE BIOPSY 18GA X 20CM

## (undated) DEVICE — APPLICATOR, PREP, CHLORAPREP, W/ORANGE TINT, 10.5ML

## (undated) DEVICE — DRAPE, LEGGINGS, 28.5 X 43 IN, DISPOSABLE, LF, STERILE

## (undated) DEVICE — GLOVE, SURGICAL, PI ORTHO PRO, BIOGEL, SZ 7.5

## (undated) DEVICE — SUTURE, SILK, 2-0, 30 IN, SH, BLACK